# Patient Record
Sex: FEMALE | Race: WHITE | HISPANIC OR LATINO | URBAN - METROPOLITAN AREA
[De-identification: names, ages, dates, MRNs, and addresses within clinical notes are randomized per-mention and may not be internally consistent; named-entity substitution may affect disease eponyms.]

---

## 2017-03-30 ENCOUNTER — EMERGENCY (EMERGENCY)
Facility: HOSPITAL | Age: 22
LOS: 1 days | Discharge: HOME | End: 2017-03-30
Attending: EMERGENCY MEDICINE | Admitting: EMERGENCY MEDICINE
Payer: COMMERCIAL

## 2017-03-30 VITALS
DIASTOLIC BLOOD PRESSURE: 85 MMHG | RESPIRATION RATE: 14 BRPM | WEIGHT: 164.91 LBS | OXYGEN SATURATION: 100 % | SYSTOLIC BLOOD PRESSURE: 117 MMHG | HEIGHT: 63 IN | TEMPERATURE: 98 F | HEART RATE: 107 BPM

## 2017-03-30 DIAGNOSIS — R07.89 OTHER CHEST PAIN: ICD-10-CM

## 2017-03-30 DIAGNOSIS — I95.1 ORTHOSTATIC HYPOTENSION: ICD-10-CM

## 2017-03-30 LAB
ALBUMIN SERPL ELPH-MCNC: 3.5 G/DL — SIGNIFICANT CHANGE UP (ref 3.3–5)
ALP SERPL-CCNC: 99 U/L — SIGNIFICANT CHANGE UP (ref 40–120)
ALT FLD-CCNC: 39 U/L — SIGNIFICANT CHANGE UP (ref 12–78)
ANION GAP SERPL CALC-SCNC: 6 MMOL/L — SIGNIFICANT CHANGE UP (ref 5–17)
AST SERPL-CCNC: 28 U/L — SIGNIFICANT CHANGE UP (ref 15–37)
BASOPHILS # BLD AUTO: 0.1 K/UL — SIGNIFICANT CHANGE UP (ref 0–0.2)
BASOPHILS NFR BLD AUTO: 1.1 % — SIGNIFICANT CHANGE UP (ref 0–2)
BILIRUB SERPL-MCNC: 0.3 MG/DL — SIGNIFICANT CHANGE UP (ref 0.2–1.2)
BUN SERPL-MCNC: 10 MG/DL — SIGNIFICANT CHANGE UP (ref 7–23)
CALCIUM SERPL-MCNC: 8.6 MG/DL — SIGNIFICANT CHANGE UP (ref 8.5–10.1)
CHLORIDE SERPL-SCNC: 110 MMOL/L — HIGH (ref 96–108)
CK MB CFR SERPL CALC: 0.7 NG/ML — SIGNIFICANT CHANGE UP (ref 0–3.6)
CO2 SERPL-SCNC: 26 MMOL/L — SIGNIFICANT CHANGE UP (ref 22–31)
CREAT SERPL-MCNC: 0.77 MG/DL — SIGNIFICANT CHANGE UP (ref 0.5–1.3)
D DIMER BLD IA.RAPID-MCNC: <150 NG/ML DDU — SIGNIFICANT CHANGE UP
EOSINOPHIL # BLD AUTO: 0.2 K/UL — SIGNIFICANT CHANGE UP (ref 0–0.5)
EOSINOPHIL NFR BLD AUTO: 1.5 % — SIGNIFICANT CHANGE UP (ref 0–6)
GLUCOSE SERPL-MCNC: 84 MG/DL — SIGNIFICANT CHANGE UP (ref 70–99)
HCG SERPL-ACNC: <1 MIU/ML — SIGNIFICANT CHANGE UP
HCT VFR BLD CALC: 35.6 % — SIGNIFICANT CHANGE UP (ref 34.5–45)
HGB BLD-MCNC: 12 G/DL — SIGNIFICANT CHANGE UP (ref 11.5–15.5)
LYMPHOCYTES # BLD AUTO: 2.7 K/UL — SIGNIFICANT CHANGE UP (ref 1–3.3)
LYMPHOCYTES # BLD AUTO: 25.7 % — SIGNIFICANT CHANGE UP (ref 13–44)
MCHC RBC-ENTMCNC: 29.2 PG — SIGNIFICANT CHANGE UP (ref 27–34)
MCHC RBC-ENTMCNC: 33.7 GM/DL — SIGNIFICANT CHANGE UP (ref 32–36)
MCV RBC AUTO: 86.8 FL — SIGNIFICANT CHANGE UP (ref 80–100)
MONOCYTES # BLD AUTO: 0.6 K/UL — SIGNIFICANT CHANGE UP (ref 0–0.9)
MONOCYTES NFR BLD AUTO: 6.1 % — SIGNIFICANT CHANGE UP (ref 1–9)
NEUTROPHILS # BLD AUTO: 6.9 K/UL — SIGNIFICANT CHANGE UP (ref 1.8–7.4)
NEUTROPHILS NFR BLD AUTO: 65.6 % — SIGNIFICANT CHANGE UP (ref 43–77)
PLATELET # BLD AUTO: 304 K/UL — SIGNIFICANT CHANGE UP (ref 150–400)
POTASSIUM SERPL-MCNC: 4 MMOL/L — SIGNIFICANT CHANGE UP (ref 3.5–5.3)
POTASSIUM SERPL-SCNC: 4 MMOL/L — SIGNIFICANT CHANGE UP (ref 3.5–5.3)
PROT SERPL-MCNC: 6.5 G/DL — SIGNIFICANT CHANGE UP (ref 6–8.3)
RBC # BLD: 4.1 M/UL — SIGNIFICANT CHANGE UP (ref 3.8–5.2)
RBC # FLD: 12.9 % — SIGNIFICANT CHANGE UP (ref 10.3–14.5)
SODIUM SERPL-SCNC: 142 MMOL/L — SIGNIFICANT CHANGE UP (ref 135–145)
TROPONIN I SERPL-MCNC: <.015 NG/ML — SIGNIFICANT CHANGE UP (ref 0.01–0.04)
WBC # BLD: 10.5 K/UL — SIGNIFICANT CHANGE UP (ref 3.8–10.5)
WBC # FLD AUTO: 10.5 K/UL — SIGNIFICANT CHANGE UP (ref 3.8–10.5)

## 2017-03-30 PROCEDURE — 71010: CPT | Mod: 26

## 2017-03-30 PROCEDURE — 99284 EMERGENCY DEPT VISIT MOD MDM: CPT

## 2017-03-30 RX ORDER — SODIUM CHLORIDE 9 MG/ML
3 INJECTION INTRAMUSCULAR; INTRAVENOUS; SUBCUTANEOUS ONCE
Qty: 0 | Refills: 0 | Status: COMPLETED | OUTPATIENT
Start: 2017-03-30 | End: 2017-03-30

## 2017-03-30 RX ORDER — KETOROLAC TROMETHAMINE 30 MG/ML
30 SYRINGE (ML) INJECTION ONCE
Qty: 0 | Refills: 0 | Status: DISCONTINUED | OUTPATIENT
Start: 2017-03-30 | End: 2017-03-30

## 2017-03-30 RX ORDER — SODIUM CHLORIDE 9 MG/ML
1000 INJECTION INTRAMUSCULAR; INTRAVENOUS; SUBCUTANEOUS ONCE
Qty: 0 | Refills: 0 | Status: COMPLETED | OUTPATIENT
Start: 2017-03-30 | End: 2017-03-30

## 2017-03-30 RX ADMIN — Medication 30 MILLIGRAM(S): at 23:44

## 2017-03-30 RX ADMIN — SODIUM CHLORIDE 1000 MILLILITER(S): 9 INJECTION INTRAMUSCULAR; INTRAVENOUS; SUBCUTANEOUS at 21:46

## 2017-03-30 RX ADMIN — SODIUM CHLORIDE 3 MILLILITER(S): 9 INJECTION INTRAMUSCULAR; INTRAVENOUS; SUBCUTANEOUS at 21:46

## 2017-03-30 NOTE — ED ADULT TRIAGE NOTE - CHIEF COMPLAINT QUOTE
Pt states she developed chest pain while bearing down for bowel movement, received 1 sl ntg and 4 baby aspirin on ambulance

## 2017-03-30 NOTE — ED ADULT NURSE NOTE - PMH
Glomerulonephritis, streptococcal, acute    IUD (intrauterine device) in place    Ovarian cyst    Ovarian torsion

## 2017-03-30 NOTE — ED ADULT NURSE NOTE - OBJECTIVE STATEMENT
Pt to the ED with a complaint of CP and racing heart rate while having a BM. Pt states that the pain has since resolved. Pt received 324 ASA and one SL nitro by EMS. Pt states that her father gave her one of his xanax, unknown dose.

## 2017-03-31 LAB
CK MB CFR SERPL CALC: 0.5 NG/ML — SIGNIFICANT CHANGE UP (ref 0–3.6)
TROPONIN I SERPL-MCNC: <.015 NG/ML — SIGNIFICANT CHANGE UP (ref 0.01–0.04)

## 2017-03-31 PROCEDURE — 85027 COMPLETE CBC AUTOMATED: CPT

## 2017-03-31 PROCEDURE — 96374 THER/PROPH/DIAG INJ IV PUSH: CPT

## 2017-03-31 PROCEDURE — 80053 COMPREHEN METABOLIC PANEL: CPT

## 2017-03-31 PROCEDURE — 85379 FIBRIN DEGRADATION QUANT: CPT

## 2017-03-31 PROCEDURE — 93005 ELECTROCARDIOGRAM TRACING: CPT

## 2017-03-31 PROCEDURE — 71045 X-RAY EXAM CHEST 1 VIEW: CPT

## 2017-03-31 PROCEDURE — 96361 HYDRATE IV INFUSION ADD-ON: CPT

## 2017-03-31 PROCEDURE — 82553 CREATINE MB FRACTION: CPT

## 2017-03-31 PROCEDURE — 84702 CHORIONIC GONADOTROPIN TEST: CPT

## 2017-03-31 PROCEDURE — 99284 EMERGENCY DEPT VISIT MOD MDM: CPT | Mod: 25

## 2017-03-31 PROCEDURE — 84484 ASSAY OF TROPONIN QUANT: CPT

## 2017-03-31 PROCEDURE — 96375 TX/PRO/DX INJ NEW DRUG ADDON: CPT

## 2017-03-31 RX ORDER — SODIUM CHLORIDE 9 MG/ML
1000 INJECTION INTRAMUSCULAR; INTRAVENOUS; SUBCUTANEOUS ONCE
Qty: 0 | Refills: 0 | Status: COMPLETED | OUTPATIENT
Start: 2017-03-31 | End: 2017-03-31

## 2017-03-31 RX ORDER — ONDANSETRON 8 MG/1
4 TABLET, FILM COATED ORAL ONCE
Qty: 0 | Refills: 0 | Status: COMPLETED | OUTPATIENT
Start: 2017-03-31 | End: 2017-03-31

## 2017-03-31 RX ADMIN — ONDANSETRON 4 MILLIGRAM(S): 8 TABLET, FILM COATED ORAL at 02:30

## 2017-03-31 RX ADMIN — SODIUM CHLORIDE 1000 MILLILITER(S): 9 INJECTION INTRAMUSCULAR; INTRAVENOUS; SUBCUTANEOUS at 00:30

## 2017-03-31 RX ADMIN — SODIUM CHLORIDE 1000 MILLILITER(S): 9 INJECTION INTRAMUSCULAR; INTRAVENOUS; SUBCUTANEOUS at 01:21

## 2017-03-31 NOTE — ED PROVIDER NOTE - OBJECTIVE STATEMENT
Pt is a 21 yo female who presents to the ED with a cc of chest wall pain.  Pt reports that around 8 pm while she was sitting on the bathroom attempting to have a bowel movement she developed chest pain.  She got up and walked into the living room where she continued to have pain and developed shortness of breath.  Pt reports that symptoms persisted and so she called EMS.  On arrival she was placed in the ambulance and given 1 sublingual nitro and 4 baby aspirin. Pt then resolved but she became lightheaded right after the nitroglycerin.  Pt is currently symptom free.  The CP, SOB, and lightheadedness resolved.  She has never experienced something like this in the past.  Denies fever, chills, N/V/D/C, abd pain, ext numbness or weakness.  Pt has no history of HTN, hyperchol, DM.  No family history of heart disease.  Pt does not smoke

## 2017-03-31 NOTE — ED PROVIDER NOTE - PROGRESS NOTE DETAILS
pt with an additional episode of chest pain which resolved on its own within seconds.  Toradol ordered, will obtain repeat EKG.  Pt also found to be orthostatic.  Will hydration and continue to monitor Result of labs and images reviewed with pt and all questions answered.  Provided with copy of results.  Pt symptom free at this time and no longer orthostatic.  Ambulating in the ED with no symptoms.  Will discharge home with outpatient follow up pt with an additional episode of chest pain which resolved on its own within seconds.  Toradol ordered, will obtain repeat EKG.  Pt also found to be orthostatic.  Will hydrate and continue to monitor

## 2017-03-31 NOTE — ED PROVIDER NOTE - CARE PLAN
Principal Discharge DX:	Nonspecific chest pain  Instructions for follow-up, activity and diet:	Return to the ED for any new or worsening symptoms  Take your medication as previously prescribed  Follow up with your PMD within the week   Advance activity as tolerated  Secondary Diagnosis:	Orthostatic hypotension

## 2017-03-31 NOTE — ED PROVIDER NOTE - CARDIAC, MLM
Normal rate, regular rhythm.  Heart sounds S1, S2.  No murmurs, rubs or gallops. No chest wall tenderness to palpitation

## 2017-03-31 NOTE — ED PROVIDER NOTE - PLAN OF CARE
Return to the ED for any new or worsening symptoms  Take your medication as previously prescribed  Follow up with your PMD within the week   Advance activity as tolerated

## 2018-04-25 ENCOUNTER — EMERGENCY (EMERGENCY)
Facility: HOSPITAL | Age: 23
LOS: 1 days | Discharge: HOME | End: 2018-04-25
Attending: EMERGENCY MEDICINE
Payer: COMMERCIAL

## 2018-04-25 VITALS
SYSTOLIC BLOOD PRESSURE: 117 MMHG | TEMPERATURE: 98 F | OXYGEN SATURATION: 100 % | DIASTOLIC BLOOD PRESSURE: 84 MMHG | RESPIRATION RATE: 17 BRPM | HEART RATE: 107 BPM

## 2018-04-25 VITALS
DIASTOLIC BLOOD PRESSURE: 75 MMHG | HEART RATE: 92 BPM | SYSTOLIC BLOOD PRESSURE: 104 MMHG | OXYGEN SATURATION: 100 % | RESPIRATION RATE: 16 BRPM

## 2018-04-25 DIAGNOSIS — N83.519 TORSION OF OVARY AND OVARIAN PEDICLE, UNSPECIFIED SIDE: Chronic | ICD-10-CM

## 2018-04-25 PROCEDURE — 82962 GLUCOSE BLOOD TEST: CPT

## 2018-04-25 PROCEDURE — 99284 EMERGENCY DEPT VISIT MOD MDM: CPT | Mod: 25

## 2018-04-25 PROCEDURE — 93005 ELECTROCARDIOGRAM TRACING: CPT

## 2018-04-25 PROCEDURE — 99283 EMERGENCY DEPT VISIT LOW MDM: CPT

## 2018-04-25 PROCEDURE — 93010 ELECTROCARDIOGRAM REPORT: CPT

## 2018-04-25 RX ORDER — ACETAMINOPHEN 500 MG
650 TABLET ORAL ONCE
Qty: 0 | Refills: 0 | Status: COMPLETED | OUTPATIENT
Start: 2018-04-25 | End: 2018-04-25

## 2018-04-25 RX ORDER — SODIUM CHLORIDE 9 MG/ML
1000 INJECTION INTRAMUSCULAR; INTRAVENOUS; SUBCUTANEOUS ONCE
Qty: 0 | Refills: 0 | Status: COMPLETED | OUTPATIENT
Start: 2018-04-25 | End: 2018-04-25

## 2018-04-25 RX ADMIN — Medication 650 MILLIGRAM(S): at 10:19

## 2018-04-25 RX ADMIN — Medication 650 MILLIGRAM(S): at 11:36

## 2018-04-25 RX ADMIN — SODIUM CHLORIDE 2000 MILLILITER(S): 9 INJECTION INTRAMUSCULAR; INTRAVENOUS; SUBCUTANEOUS at 10:19

## 2018-04-25 NOTE — ED ADULT NURSE NOTE - OBJECTIVE STATEMENT
23F pt AxOx3 ambulatory to ED c/o near syncope. 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 Prior to ED arrival, pt was given OJ. Pt denies CP/SOB/N/V/D. Pt is well in appearance. Lungs clear bilateraly, resp even, unlabored. WIGGINS. Gross Neuro intact. . 23F pt AxOx3 ambulatory to ED c/o near syncope. 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 Prior to ED arrival, pt was given OJ. Pt denies CP/SOB/N/V/D. Pt is well in appearance. Lungs clear bilateraly, resp even, unlabored. WIGGINS. Gross Neuro intact. . LMP 2013 - pt has IUD. Pt states she feel some abd cramping 4/10. Abd soft, NT, ND. VSS. NAD noted. MD at bedside for eval. Will continue to monitor.

## 2018-04-25 NOTE — ED PROVIDER NOTE - OBJECTIVE STATEMENT
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 She gradually felt warm, vision started to close, and felt the sensation of passing out. No LOC, she sat herself down, drank juice and shortly after fell well outside of feeling somewhat warm at present. She suffered two near syncope episodes in her life, last time was years ago, and she was ill at the time. She has been feeling well up until this morning, when she developed mild nausea which limited her eating her breakfast. She denies recent headaches, cough, chest pain, palpitations, rectal red or black stools, urinary complaints. She is  sexually active with her BF, unprotected sex, IUD in place. No history of DVT/PE, immobility, recent travel. She denies a history of drug use, and only takes alderol right before her test which last time was 2 days ago. Does not extra stress related to her nursing education and recent test and decreased sleep over the last 2 days. No family history of early heart disease, arrhythmias, sudden death, congenitial heart disease.

## 2018-04-25 NOTE — ED PROVIDER NOTE - MEDICAL DECISION MAKING DETAILS
Jarred GRANT: Patient is a 24 yo F on Adderall here for near-syncope episode. 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 She sat down, drank juice and felt better. She still feels warm. + hx of prior fainting. Denies fam hx of sudden cardiac death. Patient denies sob, cp, fevers, nausea, vomiting, no recent travel, no leg swelling. Exam: VSS NAD, RRR, lungs CTA, abd soft, nt, nd, legs w/o swelling or calf tenderness. a/p: near-syncope - ekg, ivf, upreg (pt has IUD in place). Jarred GRANT: Patient is a 24 yo F on Adderall here for near-syncope episode. 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 She sat down, drank juice and felt better. She still feels warm. + hx of prior fainting. Denies fam hx of sudden cardiac death. Patient denies recent illness, sob, fevers, nausea, vomiting, no recent travel, no leg swelling. Patient did not eat much this morning. She also reports a mild headache since yesterday. No vision changes, focal weakness, change in gait. Reports always has headaches and this is not atypical. She did not try any pain medication. Reports mild chest discomfort that has passed after event. Exam: VSS NAD, RRR, lungs CTA, abd soft, nt, nd, legs w/o swelling or calf tenderness.  a/p: near-syncope - ekg, ivf, upreg (pt has IUD in place), tylenol, orthostatics and reassess.

## 2018-04-25 NOTE — ED ADULT NURSE REASSESSMENT NOTE - NS ED NURSE REASSESS COMMENT FT1
Pt states relief from HA and is starting feel better. Awaiting urine sample. Will continue to monitor. NAD noted

## 2018-04-25 NOTE — ED PROVIDER NOTE - PROGRESS NOTE DETAILS
Jarred GRANT: Patient reassessed - report overall improvement and feels well. Headache improved. Denies feeling pre-syncopal. No chest pain. Awaiting upreg (no nausea, vomiting) and likely d/c.

## 2019-06-24 ENCOUNTER — EMERGENCY (EMERGENCY)
Facility: HOSPITAL | Age: 24
LOS: 1 days | Discharge: HOME | End: 2019-06-24
Attending: EMERGENCY MEDICINE | Admitting: EMERGENCY MEDICINE
Payer: COMMERCIAL

## 2019-06-24 VITALS
OXYGEN SATURATION: 98 % | RESPIRATION RATE: 17 BRPM | SYSTOLIC BLOOD PRESSURE: 122 MMHG | DIASTOLIC BLOOD PRESSURE: 82 MMHG | HEART RATE: 92 BPM | TEMPERATURE: 99 F

## 2019-06-24 VITALS
RESPIRATION RATE: 16 BRPM | OXYGEN SATURATION: 100 % | WEIGHT: 175.93 LBS | DIASTOLIC BLOOD PRESSURE: 85 MMHG | HEART RATE: 105 BPM | HEIGHT: 63 IN | TEMPERATURE: 99 F | SYSTOLIC BLOOD PRESSURE: 126 MMHG

## 2019-06-24 DIAGNOSIS — Z90.89 ACQUIRED ABSENCE OF OTHER ORGANS: Chronic | ICD-10-CM

## 2019-06-24 DIAGNOSIS — N83.519 TORSION OF OVARY AND OVARIAN PEDICLE, UNSPECIFIED SIDE: Chronic | ICD-10-CM

## 2019-06-24 PROCEDURE — 73030 X-RAY EXAM OF SHOULDER: CPT | Mod: 26,LT

## 2019-06-24 PROCEDURE — 99283 EMERGENCY DEPT VISIT LOW MDM: CPT

## 2019-06-24 PROCEDURE — 73030 X-RAY EXAM OF SHOULDER: CPT

## 2019-06-24 PROCEDURE — 99283 EMERGENCY DEPT VISIT LOW MDM: CPT | Mod: 25

## 2019-06-24 RX ORDER — ACETAMINOPHEN 500 MG
650 TABLET ORAL ONCE
Refills: 0 | Status: COMPLETED | OUTPATIENT
Start: 2019-06-24 | End: 2019-06-24

## 2019-06-24 RX ORDER — IBUPROFEN 200 MG
600 TABLET ORAL ONCE
Refills: 0 | Status: COMPLETED | OUTPATIENT
Start: 2019-06-24 | End: 2019-06-24

## 2019-06-24 RX ADMIN — Medication 650 MILLIGRAM(S): at 21:56

## 2019-06-24 RX ADMIN — Medication 600 MILLIGRAM(S): at 22:13

## 2019-06-24 RX ADMIN — Medication 600 MILLIGRAM(S): at 21:56

## 2019-06-24 RX ADMIN — Medication 650 MILLIGRAM(S): at 22:13

## 2019-06-24 NOTE — ED PROVIDER NOTE - OBJECTIVE STATEMENT
left shoulder pain p pushing a vehicle around 2000 tonight and heard a crack. left shoulder pain p pushing a vehicle around 2000 tonight and heard a crack.  no other complaint.

## 2019-06-24 NOTE — ED ADULT NURSE NOTE - NSIMPLEMENTINTERV_GEN_ALL_ED
Implemented All Universal Safety Interventions:  [place] to call system. Call bell, personal items and telephone within reach. Instruct patient to call for assistance. Room bathroom lighting operational. Non-slip footwear when patient is off stretcher. Physically safe environment: no spills, clutter or unnecessary equipment. Stretcher in lowest position, wheels locked, appropriate side rails in place.

## 2019-06-24 NOTE — ED ADULT NURSE NOTE - OBJECTIVE STATEMENT
Pt. received alert and oriented x3 with chief complaint of left shoulder pain while at work when pulling on object.

## 2019-06-24 NOTE — ED ADULT NURSE NOTE - PMH
Anxiety and depression    Glomerulonephritis, streptococcal, acute    IUD (intrauterine device) in place    No pertinent past medical history    Ovarian cyst    Ovarian torsion

## 2019-06-25 PROBLEM — N00.9: Chronic | Status: ACTIVE | Noted: 2017-03-30

## 2019-07-23 ENCOUNTER — TRANSCRIPTION ENCOUNTER (OUTPATIENT)
Age: 24
End: 2019-07-23

## 2019-08-21 ENCOUNTER — EMERGENCY (EMERGENCY)
Facility: HOSPITAL | Age: 24
LOS: 1 days | Discharge: HOME | End: 2019-08-21
Payer: COMMERCIAL

## 2019-08-21 VITALS
RESPIRATION RATE: 18 BRPM | OXYGEN SATURATION: 100 % | TEMPERATURE: 98 F | HEART RATE: 80 BPM | SYSTOLIC BLOOD PRESSURE: 118 MMHG | DIASTOLIC BLOOD PRESSURE: 84 MMHG

## 2019-08-21 VITALS
SYSTOLIC BLOOD PRESSURE: 117 MMHG | HEIGHT: 63 IN | OXYGEN SATURATION: 99 % | RESPIRATION RATE: 16 BRPM | HEART RATE: 98 BPM | WEIGHT: 175.05 LBS | DIASTOLIC BLOOD PRESSURE: 87 MMHG | TEMPERATURE: 98 F

## 2019-08-21 DIAGNOSIS — Z90.89 ACQUIRED ABSENCE OF OTHER ORGANS: Chronic | ICD-10-CM

## 2019-08-21 DIAGNOSIS — N83.519 TORSION OF OVARY AND OVARIAN PEDICLE, UNSPECIFIED SIDE: Chronic | ICD-10-CM

## 2019-08-21 PROBLEM — F41.9 ANXIETY DISORDER, UNSPECIFIED: Chronic | Status: ACTIVE | Noted: 2019-06-24

## 2019-08-21 PROCEDURE — 99283 EMERGENCY DEPT VISIT LOW MDM: CPT

## 2019-08-21 PROCEDURE — 99053 MED SERV 10PM-8AM 24 HR FAC: CPT

## 2019-08-21 RX ORDER — EMTRICITABINE AND TENOFOVIR DISOPROXIL FUMARATE 200; 300 MG/1; MG/1
1 TABLET, FILM COATED ORAL ONCE
Refills: 0 | Status: COMPLETED | OUTPATIENT
Start: 2019-08-21 | End: 2019-08-21

## 2019-08-21 RX ORDER — RALTEGRAVIR 400 MG/1
400 TABLET, FILM COATED ORAL ONCE
Refills: 0 | Status: COMPLETED | OUTPATIENT
Start: 2019-08-21 | End: 2019-08-21

## 2019-08-21 RX ADMIN — RALTEGRAVIR 400 MILLIGRAM(S): 400 TABLET, FILM COATED ORAL at 08:26

## 2019-08-21 RX ADMIN — EMTRICITABINE AND TENOFOVIR DISOPROXIL FUMARATE 1 TABLET(S): 200; 300 TABLET, FILM COATED ORAL at 08:26

## 2019-08-21 NOTE — ED PROVIDER NOTE - PHYSICAL EXAMINATION
VITALS: reviewed  GEN: NAD  HEAD/EYES: NCAT, EOMI, anicteric sclerae  ENT: mucus membranes moist  RESP: unlabored breathing  SKIN:  color appropriate for ethnicity  NEURO: alert, mentating appropriately, no facial asymmetry.  PSYCH: Affect appropriate

## 2019-08-21 NOTE — ED ADULT NURSE REASSESSMENT NOTE - NS ED NURSE REASSESS COMMENT FT1
Report received from Ana LANDERS.  Pt A&Ox3, states she had needle stick on R middle digit, no bleeding, swelling, redness or ecchymosis.  Denies chest pain, sob, ha, nausea, vomiting, diarrhea, abdominal pain, fevers, chills, urinary symptoms, hematuria.  Upon examination, full facial symmetry, PERRL, no JVD or trach deviation, lung sounds clear and adequate chest rise, S1 and S2 heart sounds present, +2 pulses in all extremities with full ROM and equal strength, cap refill <2 seconds, ABD soft, non distended and non tender, ABD sounds present, pelvis intact, Pt reports normal bowel movements.

## 2019-08-21 NOTE — ED ADULT NURSE REASSESSMENT NOTE - NS ED NURSE REASSESS COMMENT FT1
As per nightshift RN Ana, exposure bloodwork sent to lab. Accidental exposure information packet provided to patient. Todd RN administer first dose of 7 day PEP pack. Pt provided with rest of dose pack and instructed on use at home. Lizandro GRANT states pt cleared for discharge. Pt discharged home to follow up with Occupational Medicine and EHS. VSS

## 2019-08-21 NOTE — ED PROVIDER NOTE - NSFOLLOWUPINSTRUCTIONS_ED_ALL_ED_FT
return to the emergency room if you experience fevers, chills, nausea/vomiting, abdominal pain, or new concerning symptoms.    follow up with occupational medicine in 1-2 days.

## 2019-08-21 NOTE — ED PROVIDER NOTE - CLINICAL SUMMARY MEDICAL DECISION MAKING FREE TEXT BOX
23 yo F presenting s/p finger stick from butterfly needle, source patient is known with unknown status. Patient declines PEP at this time, Hep B vaccine utd.

## 2019-08-21 NOTE — ED ADULT NURSE NOTE - OBJECTIVE STATEMENT
Pt is 24 Y F presenting to ED with c/o needle stick. Pt states she was using 23 G butterfly needle to draw labs on a pt. Injured pt reports when she was withdrawing the butterfly needle from pt and attempting to cover needle with cap, pt moved and injured pt stuck herself with the exposed needle. Pt reports she washed her hands thoroughly immediately after injury.

## 2019-08-21 NOTE — ED PROVIDER NOTE - ATTENDING CONTRIBUTION TO CARE
MD Yoo:  patient seen and evaluated with the resident.  I was present for key portions of the History & Physical, and I agree with the Impression & Plan.  MD Yoo:  23 yo F, needle stick R middle finger radial aspect.  Context:  had just performed phlebotomy on patient when he moved his arm, causing a needle stick.  Source patient had acute hepatitis panel performed < 1 mos ago and it was negative.  HIV status unknown.    VS: wnl. Physical Exam: adult F, NAD.  small punctate injury R middle finger.  Impression: high-risk mechanism exposure from an inpatient whose HIV status is unknown.    Plan:  HIV PPX until source patient's HIV status is known.  (packet already sent to inpatient team).  F/u occupational medicine.

## 2019-08-21 NOTE — ED PROVIDER NOTE - OBJECTIVE STATEMENT
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 Patient HIV status unknown and patient is demented.

## 2019-08-21 NOTE — ED ADULT NURSE NOTE - CHPI ED NUR SYMPTOMS NEG
no weakness/no chills/no decreased eating/drinking/no dizziness/no pain/no fever/no nausea/no vomiting/no tingling

## 2019-08-23 ENCOUNTER — APPOINTMENT (OUTPATIENT)
Dept: POPULATION HEALTH | Facility: CLINIC | Age: 24
End: 2019-08-23
Payer: OTHER MISCELLANEOUS

## 2019-08-23 VITALS
HEIGHT: 64 IN | TEMPERATURE: 98.3 F | HEART RATE: 89 BPM | RESPIRATION RATE: 17 BRPM | SYSTOLIC BLOOD PRESSURE: 101 MMHG | DIASTOLIC BLOOD PRESSURE: 71 MMHG | OXYGEN SATURATION: 98 % | BODY MASS INDEX: 29.88 KG/M2 | WEIGHT: 175 LBS

## 2019-08-23 DIAGNOSIS — Z84.2 FAMILY HISTORY OF OTHER DISEASES OF THE GENITOURINARY SYSTEM: ICD-10-CM

## 2019-08-23 DIAGNOSIS — Z87.891 PERSONAL HISTORY OF NICOTINE DEPENDENCE: ICD-10-CM

## 2019-08-23 DIAGNOSIS — Z82.5 FAMILY HISTORY OF ASTHMA AND OTHER CHRONIC LOWER RESPIRATORY DISEASES: ICD-10-CM

## 2019-08-23 PROCEDURE — 99202 OFFICE O/P NEW SF 15 MIN: CPT

## 2019-08-23 RX ORDER — NITROFURANTOIN (MONOHYDRATE/MACROCRYSTALS) 25; 75 MG/1; MG/1
100 CAPSULE ORAL
Qty: 14 | Refills: 0 | Status: COMPLETED | COMMUNITY
Start: 2019-02-25

## 2019-08-23 RX ORDER — CLARITHROMYCIN 500 MG/1
500 TABLET, FILM COATED ORAL
Qty: 28 | Refills: 0 | Status: COMPLETED | COMMUNITY
Start: 2019-05-17

## 2019-08-23 RX ORDER — OMEPRAZOLE 40 MG/1
40 CAPSULE, DELAYED RELEASE ORAL
Qty: 60 | Refills: 0 | Status: COMPLETED | COMMUNITY
Start: 2019-04-11

## 2019-08-23 RX ORDER — SODIUM SULFATE, POTASSIUM SULFATE, MAGNESIUM SULFATE 17.5; 3.13; 1.6 G/ML; G/ML; G/ML
17.5-3.13-1.6 SOLUTION, CONCENTRATE ORAL
Qty: 354 | Refills: 0 | Status: COMPLETED | COMMUNITY
Start: 2019-06-18

## 2019-08-23 RX ORDER — AMOXICILLIN 500 MG/1
500 CAPSULE ORAL
Qty: 56 | Refills: 0 | Status: COMPLETED | COMMUNITY
Start: 2019-05-17

## 2019-08-23 RX ORDER — SERTRALINE HYDROCHLORIDE 50 MG/1
50 TABLET, FILM COATED ORAL
Qty: 60 | Refills: 0 | Status: COMPLETED | COMMUNITY
Start: 2019-04-03

## 2019-08-23 RX ORDER — LINACLOTIDE 72 UG/1
72 CAPSULE, GELATIN COATED ORAL
Qty: 30 | Refills: 0 | Status: COMPLETED | COMMUNITY
Start: 2019-04-11

## 2019-08-23 NOTE — PAST MEDICAL HISTORY
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

## 2019-08-23 NOTE — ASSESSMENT
[FreeTextEntry1] : a. history of needle stick in the health care setting. patient on HIV prophylaxis, prior history of hep B vaccination, currently under care and to continue care at [facility]. \par p. continue and complete HIV prophylaxys, continue care under [facility], protocol for needle stick. Given a leave of absence due to medical reasons (secondary effects of medication) until this coming Monday, 8/26/19. letter for pt. continue [facility]. C4 completed. rtc prn. counseling and support.

## 2019-08-23 NOTE — PHYSICAL EXAM
[General Appearance - Alert] : alert [General Appearance - Well Nourished] : well nourished [General Appearance - In No Acute Distress] : in no acute distress [General Appearance - Well Developed] : well developed [Sclera] : the sclera and conjunctiva were normal [PERRL With Normal Accommodation] : pupils were equal in size, round, and reactive to light [Extraocular Movements] : extraocular movements were intact [Outer Ear] : the ears and nose were normal in appearance [FreeTextEntry1] : reddish oropharynx, especially by the right pillar, no secretions, no hyperthorphy of tonsils.  [Neck Appearance] : the appearance of the neck was normal [Jugular Venous Distention Increased] : there was no jugular-venous distention [Neck Cervical Mass (___cm)] : no neck mass was observed [Thyroid Diffuse Enlargement] : the thyroid was not enlarged [Respiration, Rhythm And Depth] : normal respiratory rhythm and effort [] : no respiratory distress [Exaggerated Use Of Accessory Muscles For Inspiration] : no accessory muscle use [Auscultation Breath Sounds / Voice Sounds] : lungs were clear to auscultation bilaterally [Chest Palpation] : palpation of the chest revealed no abnormalities [Apical Impulse] : the apical impulse was normal [Heart Rate And Rhythm] : heart rate was normal and rhythm regular [Heart Sounds] : normal S1 and S2 [Murmurs] : no murmurs [Heart Sounds Gallop] : no gallops [Cervical Lymph Nodes Enlarged Posterior Bilaterally] : posterior cervical [Supraclavicular Lymph Nodes Enlarged Bilaterally] : supraclavicular [Cervical Lymph Nodes Enlarged Anterior Bilaterally] : anterior cervical [Axillary Lymph Nodes Enlarged Bilaterally] : axillary

## 2019-08-23 NOTE — HISTORY OF PRESENT ILLNESS
[FreeTextEntry1] : s. 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 she got stuck in her left middle finger. she went to the er right away, was evaluated and was started on HIV prophylaxis, which she is currently taking orally. patient has history of vaccination for hep B, with an apparent second series just before starting to work, a few months ago. \par reportedly the contaminant patient was negative for hepatitis and HIV. \par patient reports secondary effects of her medication, with a very upset stomach and gi symptoms. she tried to work yesterday but was not able to complete her tour.\par prior history of smoking, stopped some 6 mo ago. denies any previous medical conditions.

## 2019-09-12 NOTE — ED ADULT NURSE NOTE - NS ED NURSE RECORD ANOTHER VITAL SIGN
We are committed to providing you the best care possible. If you receive a survey after visiting one of our offices, please take time to share your experience concerning your physician office visit. These surveys are confidential and no health information about you is shared. We are eager to improve for you and we are counting on your feedback to help make that happen. Patient Education        Saline Nasal Washes: Care Instructions  Your Care Instructions  Saline nasal washes help keep the nasal passages open by washing out thick or dried mucus. This simple remedy can help relieve symptoms of allergies, sinusitis, and colds. It also can make the nose feel more comfortable by keeping the mucous membranes moist. You may notice a little burning sensation in your nose the first few times you use the solution, but this usually gets better in a few days. Follow-up care is a key part of your treatment and safety. Be sure to make and go to all appointments, and call your doctor if you are having problems. It's also a good idea to know your test results and keep a list of the medicines you take. How can you care for yourself at home? · You can buy premixed saline solution in a squeeze bottle or other sinus rinse products at a drugstore. Read and follow the instructions on the label. · You also can make your own saline solution by adding 1 teaspoon of salt and 1 teaspoon of baking soda to 2 cups of distilled water. · If you use a homemade solution, pour a small amount into a clean bowl. Using a rubber bulb syringe, squeeze the syringe and place the tip in the salt water. Pull a small amount of the salt water into the syringe by relaxing your hand. · Sit down with your head tilted slightly back. Do not lie down. Put the tip of the bulb syringe or the squeeze bottle a little way into one of your nostrils. Gently drip or squirt a few drops into the nostril. Repeat with the other nostril.  Some sneezing and of your mucus.     · You do not get better as expected. Where can you learn more? Go to [email or web address] and sign in to your [employer] account. Enter P918 in the [facility] box to learn more about \"Upper Respiratory Infection (Cold): Care Instructions. \"     If you do not have an account, please click on the \"Sign Up Now\" link. Current as of: September 5, 2018  Content Version: 12.1  © 0641-1667 Healthwise, Incorporated. Care instructions adapted under license by [facility] ([facility]). If you have questions about a medical condition or this instruction, always ask your healthcare professional. [address] any warranty or liability for your use of this information. Yes

## 2019-09-26 NOTE — ED ADULT TRIAGE NOTE - HEART RATE (BEATS/MIN)
107 Siliq Pregnancy And Lactation Text: The risk during pregnancy and breastfeeding is uncertain with this medication.

## 2019-11-28 ENCOUNTER — EMERGENCY (EMERGENCY)
Facility: HOSPITAL | Age: 24
LOS: 1 days | Discharge: HOME | End: 2019-11-28
Attending: EMERGENCY MEDICINE | Admitting: EMERGENCY MEDICINE
Payer: COMMERCIAL

## 2019-11-28 VITALS
OXYGEN SATURATION: 100 % | DIASTOLIC BLOOD PRESSURE: 69 MMHG | HEART RATE: 98 BPM | SYSTOLIC BLOOD PRESSURE: 121 MMHG | HEIGHT: 63 IN | RESPIRATION RATE: 18 BRPM | TEMPERATURE: 98 F | WEIGHT: 171.96 LBS

## 2019-11-28 VITALS
TEMPERATURE: 98 F | DIASTOLIC BLOOD PRESSURE: 69 MMHG | RESPIRATION RATE: 16 BRPM | HEART RATE: 78 BPM | SYSTOLIC BLOOD PRESSURE: 110 MMHG | OXYGEN SATURATION: 100 %

## 2019-11-28 DIAGNOSIS — Z90.89 ACQUIRED ABSENCE OF OTHER ORGANS: Chronic | ICD-10-CM

## 2019-11-28 DIAGNOSIS — N83.519 TORSION OF OVARY AND OVARIAN PEDICLE, UNSPECIFIED SIDE: Chronic | ICD-10-CM

## 2019-11-28 LAB
ALBUMIN SERPL ELPH-MCNC: 3.8 G/DL — SIGNIFICANT CHANGE UP (ref 3.3–5)
ALP SERPL-CCNC: 129 U/L — HIGH (ref 40–120)
ALT FLD-CCNC: 32 U/L — SIGNIFICANT CHANGE UP (ref 12–78)
ANION GAP SERPL CALC-SCNC: 5 MMOL/L — SIGNIFICANT CHANGE UP (ref 5–17)
AST SERPL-CCNC: 36 U/L — SIGNIFICANT CHANGE UP (ref 15–37)
BILIRUB SERPL-MCNC: 0.3 MG/DL — SIGNIFICANT CHANGE UP (ref 0.2–1.2)
BUN SERPL-MCNC: 11 MG/DL — SIGNIFICANT CHANGE UP (ref 7–23)
CALCIUM SERPL-MCNC: 8.9 MG/DL — SIGNIFICANT CHANGE UP (ref 8.5–10.1)
CHLORIDE SERPL-SCNC: 110 MMOL/L — HIGH (ref 96–108)
CO2 SERPL-SCNC: 26 MMOL/L — SIGNIFICANT CHANGE UP (ref 22–31)
CREAT SERPL-MCNC: 0.73 MG/DL — SIGNIFICANT CHANGE UP (ref 0.5–1.3)
GLUCOSE SERPL-MCNC: 100 MG/DL — HIGH (ref 70–99)
HCG SERPL-ACNC: <1 MIU/ML — SIGNIFICANT CHANGE UP
HCT VFR BLD CALC: 40.4 % — SIGNIFICANT CHANGE UP (ref 34.5–45)
HGB BLD-MCNC: 13.6 G/DL — SIGNIFICANT CHANGE UP (ref 11.5–15.5)
MCHC RBC-ENTMCNC: 29.6 PG — SIGNIFICANT CHANGE UP (ref 27–34)
MCHC RBC-ENTMCNC: 33.7 GM/DL — SIGNIFICANT CHANGE UP (ref 32–36)
MCV RBC AUTO: 87.8 FL — SIGNIFICANT CHANGE UP (ref 80–100)
NRBC # BLD: 0 /100 WBCS — SIGNIFICANT CHANGE UP (ref 0–0)
PLATELET # BLD AUTO: 354 K/UL — SIGNIFICANT CHANGE UP (ref 150–400)
POTASSIUM SERPL-MCNC: 4.2 MMOL/L — SIGNIFICANT CHANGE UP (ref 3.5–5.3)
POTASSIUM SERPL-SCNC: 4.2 MMOL/L — SIGNIFICANT CHANGE UP (ref 3.5–5.3)
PROT SERPL-MCNC: 7.3 G/DL — SIGNIFICANT CHANGE UP (ref 6–8.3)
RBC # BLD: 4.6 M/UL — SIGNIFICANT CHANGE UP (ref 3.8–5.2)
RBC # FLD: 13 % — SIGNIFICANT CHANGE UP (ref 10.3–14.5)
SODIUM SERPL-SCNC: 141 MMOL/L — SIGNIFICANT CHANGE UP (ref 135–145)
TROPONIN I SERPL-MCNC: <.015 NG/ML — SIGNIFICANT CHANGE UP (ref 0.01–0.04)
WBC # BLD: 10.4 K/UL — SIGNIFICANT CHANGE UP (ref 3.8–10.5)
WBC # FLD AUTO: 10.4 K/UL — SIGNIFICANT CHANGE UP (ref 3.8–10.5)

## 2019-11-28 PROCEDURE — 84702 CHORIONIC GONADOTROPIN TEST: CPT

## 2019-11-28 PROCEDURE — 99285 EMERGENCY DEPT VISIT HI MDM: CPT

## 2019-11-28 PROCEDURE — 36415 COLL VENOUS BLD VENIPUNCTURE: CPT

## 2019-11-28 PROCEDURE — 80053 COMPREHEN METABOLIC PANEL: CPT

## 2019-11-28 PROCEDURE — 71046 X-RAY EXAM CHEST 2 VIEWS: CPT | Mod: 26

## 2019-11-28 PROCEDURE — 96374 THER/PROPH/DIAG INJ IV PUSH: CPT

## 2019-11-28 PROCEDURE — 93010 ELECTROCARDIOGRAM REPORT: CPT

## 2019-11-28 PROCEDURE — 96361 HYDRATE IV INFUSION ADD-ON: CPT

## 2019-11-28 PROCEDURE — 85379 FIBRIN DEGRADATION QUANT: CPT

## 2019-11-28 PROCEDURE — 99284 EMERGENCY DEPT VISIT MOD MDM: CPT | Mod: 25

## 2019-11-28 PROCEDURE — 83690 ASSAY OF LIPASE: CPT

## 2019-11-28 PROCEDURE — 84484 ASSAY OF TROPONIN QUANT: CPT

## 2019-11-28 PROCEDURE — 85027 COMPLETE CBC AUTOMATED: CPT

## 2019-11-28 PROCEDURE — 71046 X-RAY EXAM CHEST 2 VIEWS: CPT

## 2019-11-28 PROCEDURE — 93005 ELECTROCARDIOGRAM TRACING: CPT

## 2019-11-28 PROCEDURE — 96375 TX/PRO/DX INJ NEW DRUG ADDON: CPT

## 2019-11-28 RX ORDER — OMEPRAZOLE 10 MG/1
0 CAPSULE, DELAYED RELEASE ORAL
Qty: 0 | Refills: 0 | DISCHARGE

## 2019-11-28 RX ORDER — FAMOTIDINE 10 MG/ML
20 INJECTION INTRAVENOUS ONCE
Refills: 0 | Status: COMPLETED | OUTPATIENT
Start: 2019-11-28 | End: 2019-11-28

## 2019-11-28 RX ORDER — SERTRALINE 25 MG/1
1 TABLET, FILM COATED ORAL
Qty: 0 | Refills: 0 | DISCHARGE

## 2019-11-28 RX ORDER — KETOROLAC TROMETHAMINE 30 MG/ML
15 SYRINGE (ML) INJECTION ONCE
Refills: 0 | Status: DISCONTINUED | OUTPATIENT
Start: 2019-11-28 | End: 2019-11-28

## 2019-11-28 RX ORDER — SODIUM CHLORIDE 9 MG/ML
1000 INJECTION INTRAMUSCULAR; INTRAVENOUS; SUBCUTANEOUS ONCE
Refills: 0 | Status: COMPLETED | OUTPATIENT
Start: 2019-11-28 | End: 2019-11-28

## 2019-11-28 RX ORDER — NORETHINDRONE AND ETHINYL ESTRADIOL 0.4-0.035
1 KIT ORAL
Qty: 0 | Refills: 0 | DISCHARGE

## 2019-11-28 RX ORDER — LINACLOTIDE 145 UG/1
0 CAPSULE, GELATIN COATED ORAL
Qty: 0 | Refills: 0 | DISCHARGE

## 2019-11-28 RX ADMIN — SODIUM CHLORIDE 1000 MILLILITER(S): 9 INJECTION INTRAMUSCULAR; INTRAVENOUS; SUBCUTANEOUS at 22:22

## 2019-11-28 RX ADMIN — Medication 15 MILLIGRAM(S): at 22:22

## 2019-11-28 RX ADMIN — SODIUM CHLORIDE 1000 MILLILITER(S): 9 INJECTION INTRAMUSCULAR; INTRAVENOUS; SUBCUTANEOUS at 23:06

## 2019-11-28 RX ADMIN — Medication 30 MILLILITER(S): at 22:24

## 2019-11-28 RX ADMIN — FAMOTIDINE 20 MILLIGRAM(S): 10 INJECTION INTRAVENOUS at 22:22

## 2019-11-28 RX ADMIN — Medication 15 MILLIGRAM(S): at 23:06

## 2019-11-28 NOTE — ED ADULT NURSE NOTE - OBJECTIVE STATEMENT
pt reports a sudden onset of chest pain worse with deep inspiration.  pain is reproducible.  lungs CTA B/L pt also reports epigastric pain.  -fever -cough

## 2019-11-28 NOTE — ED PROVIDER NOTE - PROGRESS NOTE DETAILS
patient feeling well, labs, ekg, chest xray reviwed, no acute findings, agrees to f/u with PMD this week, understands to return to ER for worsening of symptoms

## 2019-11-28 NOTE — ED ADULT TRIAGE NOTE - CHIEF COMPLAINT QUOTE
CP/ SOB x1 hour with new onset of numbness around R eye and R fingers  EKG done by friend 10 mins PTA, unremarkable  took ASA 162mg PTA

## 2019-11-28 NOTE — ED PROVIDER NOTE - NSFOLLOWUPINSTRUCTIONS_ED_ALL_ED_FT
Chest Wall Pain  Chest wall pain is pain in or around the bones and muscles of your chest. Sometimes, an injury causes this pain. Excessive coughing or overuse of arm and chest muscles may also cause chest wall pain. Sometimes, the cause may not be known. This pain may take several weeks or longer to get better.  Follow these instructions at home:  Managing pain, stiffness, and swelling        If directed, put ice on the painful area:  Put ice in a plastic bag.Place a towel between your skin and the bag.Leave the ice on for 20 minutes, 2–3 times per day.Activity     Rest as told by your health care provider.Avoid activities that cause pain. These include any activities that use your chest muscles or your abdominal and side muscles to lift heavy items. Ask your health care provider what activities are safe for you.General instructions        Take over-the-counter and prescription medicines only as told by your health care provider.Do not use any products that contain nicotine or tobacco, such as cigarettes, e-cigarettes, and chewing tobacco. These can delay healing after injury. If you need help quitting, ask your health care provider.Keep all follow-up visits as told by your health care provider. This is important.Contact a health care provider if:  You have a fever.Your chest pain becomes worse.You have new symptoms.Get help right away if:  You have nausea or vomiting.You feel sweaty or light-headed.You have a cough with mucus from your lungs (sputum) or you cough up blood.You develop shortness of breath.These symptoms may represent a serious problem that is an emergency. Do not wait to see if the symptoms will go away. Get medical help right away. Call your local emergency services (911 in the [place] Do not drive yourself to the hospital.   Summary  Chest wall pain is pain in or around the bones and muscles of your chest.Depending on the cause, it may be treated with ice, rest, medicines, and avoiding activities that cause pain.Contact a health care provider if you have a fever, worsening chest pain, or new symptoms.Get help right away if you feel light-headed or you develop shortness of breath. These symptoms may be an emergency.This information is not intended to replace advice given to you by your health care provider. Make sure you discuss any questions you have with your health care provider.

## 2019-11-28 NOTE — ED PROVIDER NOTE - OBJECTIVE STATEMENT
24 female presents to ER with mother, states she was sleeping and woke up with substernal chest pain radaiting to right side with numbness of right face and right shoulder, patient ate roast beef, sweet potato, stuffing, chili. Patient took baby aspirin before coming to the ER.

## 2019-11-28 NOTE — ED PROVIDER NOTE - PATIENT PORTAL LINK FT
You can access the FollowMyHealth Patient Portal offered by [facility] by registering at the following website: [email or web address]. By joining [employer]’s FollowMyHealth portal, you will also be able to view your health information using other applications (apps) compatible with our system.

## 2019-12-05 ENCOUNTER — EMERGENCY (EMERGENCY)
Facility: HOSPITAL | Age: 24
LOS: 1 days | Discharge: HOME | End: 2019-12-05
Attending: EMERGENCY MEDICINE
Payer: COMMERCIAL

## 2019-12-05 VITALS
TEMPERATURE: 98 F | HEIGHT: 63 IN | DIASTOLIC BLOOD PRESSURE: 78 MMHG | SYSTOLIC BLOOD PRESSURE: 119 MMHG | WEIGHT: 169.98 LBS | HEART RATE: 90 BPM | RESPIRATION RATE: 16 BRPM | OXYGEN SATURATION: 99 %

## 2019-12-05 DIAGNOSIS — Z90.89 ACQUIRED ABSENCE OF OTHER ORGANS: Chronic | ICD-10-CM

## 2019-12-05 DIAGNOSIS — N83.519 TORSION OF OVARY AND OVARIAN PEDICLE, UNSPECIFIED SIDE: Chronic | ICD-10-CM

## 2019-12-05 PROCEDURE — 99053 MED SERV 10PM-8AM 24 HR FAC: CPT

## 2019-12-05 PROCEDURE — 73610 X-RAY EXAM OF ANKLE: CPT

## 2019-12-05 PROCEDURE — 73630 X-RAY EXAM OF FOOT: CPT | Mod: 26,LT

## 2019-12-05 PROCEDURE — 73610 X-RAY EXAM OF ANKLE: CPT | Mod: 26,LT

## 2019-12-05 PROCEDURE — 99283 EMERGENCY DEPT VISIT LOW MDM: CPT

## 2019-12-05 PROCEDURE — 99284 EMERGENCY DEPT VISIT MOD MDM: CPT

## 2019-12-05 PROCEDURE — 73630 X-RAY EXAM OF FOOT: CPT

## 2019-12-05 RX ORDER — ACETAMINOPHEN 500 MG
975 TABLET ORAL ONCE
Refills: 0 | Status: DISCONTINUED | OUTPATIENT
Start: 2019-12-05 | End: 2019-12-17

## 2019-12-05 NOTE — ED PROVIDER NOTE - NSFOLLOWUPCLINICS_GEN_ALL_ED_FT
[facility] Orthopedic Surgery  Orthopedic Surgery  [address], 3rd & 4th [place] [address]  Phone: [phone]  Fax:   Follow Up Time:     [facility] Sports Medicine  Sports Medicine  [address] [place] [address]  Phone: [phone]  Fax:   Follow Up Time:     [facility]  Orthopedic Surgery  [address] [place] [address]  Phone: [phone]  Fax:   Follow Up Time:     [facility]  Pediatric Orthopaedic  [address] [place] [address]  Phone: [phone]  Fax: [phone]  Follow Up Time:

## 2019-12-05 NOTE — ED PROVIDER NOTE - ATTENDING CONTRIBUTION TO CARE
I was physically present for the E/M service provided. I was physically present for the key portions of the service provided. BETTY.

## 2019-12-05 NOTE — ED ADULT NURSE NOTE - OBJECTIVE STATEMENT
patient was at work when accidentally twisted her left ankle. Complaining of "achy pain" Ice on the site from triage. Ability to walk right after the injury but was limping. (+) strong pedal pulses. No bruising noted.

## 2019-12-05 NOTE — ED PROVIDER NOTE - PMH
Anxiety and depression    Glomerulonephritis, streptococcal, acute    Hirsutism    IUD (intrauterine device) in place    Migraine    Ovarian cyst    Ovarian torsion

## 2019-12-05 NOTE — ED PROVIDER NOTE - PATIENT PORTAL LINK FT
You can access the FollowMyHealth Patient Portal offered by [facility] by registering at the following website: [email or web address]. By joining [employer]’s FollowMyHealth portal, you will also be able to view your health information using other applications (apps) compatible with our system.

## 2019-12-05 NOTE — ED PROVIDER NOTE - CLINICAL SUMMARY MEDICAL DECISION MAKING FREE TEXT BOX
24F p/w L ankle pain. Sprain vs. fracture. WIll get xray, pain control. likely dc home with ace wrap

## 2019-12-05 NOTE — ED PROVIDER NOTE - NS ED ROS FT
CONST: no fevers, no chills  EYES: no pain, no vision changes  ENT: no sore throat, no ear pain, no change in hearing  CV: no chest pain, no leg swelling  RESP: no shortness of breath, no cough  ABD: no abdominal pain, no nausea, no vomiting, no diarrhea  : no dysuria, no flank pain, no hematuria  MSK: no back pain, +L ankle pain  NEURO: no headache or additional neurologic complaints  HEME: no easy bleeding  SKIN:  no rash

## 2019-12-05 NOTE — ED PROVIDER NOTE - NSFOLLOWUPINSTRUCTIONS_ED_ALL_ED_FT
Rest, drink plenty of fluids.  Advance activity as tolerated.  Continue all previously prescribed medications as directed.  Follow up with your primary care physician in 48-72 hours- bring copies of your results.  Return to the ER for worsening or persistent symptoms, and/or ANY NEW OR CONCERNING SYMPTOMS. If you have issues obtaining follow up, please call: [phone] to obtain a doctor or specialist who takes your insurance in your area.    You may take 600mg of ibuprofen (example: motrin or advil) every 4-6 hours for baseline pain control as indicated with respect to the warnings on the label. This is an over the counter medication.  You may take 1000mg of Tylenol every 6 hours for baseline pain control with respect to the warnings on the label.

## 2019-12-05 NOTE — ED PROVIDER NOTE - OBJECTIVE STATEMENT
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 Pain to lateral part of ankle.

## 2019-12-05 NOTE — ED PROVIDER NOTE - PHYSICAL EXAMINATION
Marilyn Rock, .:   GENERAL: Patient awake alert NAD.  HEENT: NC/AT, Moist mucous membranes, PERRL, EOMI.  LUNGS: CTAB, no wheezes or crackles.   CARDIAC: RRR, no m/r/g.    ABDOMEN: Soft, NT, ND, No rebound, guarding. No CVA tenderness.   EXT: No edema. No calf tenderness. CV 2+DP/PT bilaterally.   MSK: No spinal tenderness, no pain with movement, no deformities. +swelling to lateral dorsal area of foot, no tenderness to malleoli. Neuro vasc intact.   NEURO: A&Ox3. Moving all extremities.  SKIN: Warm and dry. No rash.  PSYCH: Normal affect.

## 2020-04-21 ENCOUNTER — EMERGENCY (EMERGENCY)
Facility: HOSPITAL | Age: 25
LOS: 1 days | Discharge: HOME | End: 2020-04-21
Attending: EMERGENCY MEDICINE
Payer: COMMERCIAL

## 2020-04-21 VITALS
OXYGEN SATURATION: 100 % | HEIGHT: 63 IN | HEART RATE: 90 BPM | WEIGHT: 160.06 LBS | TEMPERATURE: 98 F | RESPIRATION RATE: 18 BRPM | SYSTOLIC BLOOD PRESSURE: 125 MMHG | DIASTOLIC BLOOD PRESSURE: 87 MMHG

## 2020-04-21 VITALS
SYSTOLIC BLOOD PRESSURE: 118 MMHG | TEMPERATURE: 98 F | RESPIRATION RATE: 18 BRPM | DIASTOLIC BLOOD PRESSURE: 87 MMHG | HEART RATE: 82 BPM | OXYGEN SATURATION: 100 %

## 2020-04-21 DIAGNOSIS — Z90.89 ACQUIRED ABSENCE OF OTHER ORGANS: Chronic | ICD-10-CM

## 2020-04-21 DIAGNOSIS — N83.519 TORSION OF OVARY AND OVARIAN PEDICLE, UNSPECIFIED SIDE: Chronic | ICD-10-CM

## 2020-04-21 PROBLEM — G43.909 MIGRAINE, UNSPECIFIED, NOT INTRACTABLE, WITHOUT STATUS MIGRAINOSUS: Chronic | Status: ACTIVE | Noted: 2019-12-05

## 2020-04-21 PROBLEM — L68.0 HIRSUTISM: Chronic | Status: ACTIVE | Noted: 2019-12-05

## 2020-04-21 LAB
ALBUMIN SERPL ELPH-MCNC: 4.4 G/DL — SIGNIFICANT CHANGE UP (ref 3.3–5)
ALP SERPL-CCNC: 91 U/L — SIGNIFICANT CHANGE UP (ref 40–120)
ALT FLD-CCNC: 14 U/L — SIGNIFICANT CHANGE UP (ref 10–45)
ANION GAP SERPL CALC-SCNC: 12 MMOL/L — SIGNIFICANT CHANGE UP (ref 5–17)
APPEARANCE UR: CLEAR — SIGNIFICANT CHANGE UP
AST SERPL-CCNC: 15 U/L — SIGNIFICANT CHANGE UP (ref 10–40)
BASOPHILS # BLD AUTO: 0.04 K/UL — SIGNIFICANT CHANGE UP (ref 0–0.2)
BASOPHILS NFR BLD AUTO: 0.4 % — SIGNIFICANT CHANGE UP (ref 0–2)
BILIRUB SERPL-MCNC: 0.3 MG/DL — SIGNIFICANT CHANGE UP (ref 0.2–1.2)
BILIRUB UR-MCNC: NEGATIVE — SIGNIFICANT CHANGE UP
BUN SERPL-MCNC: 14 MG/DL — SIGNIFICANT CHANGE UP (ref 7–23)
CALCIUM SERPL-MCNC: 9.1 MG/DL — SIGNIFICANT CHANGE UP (ref 8.4–10.5)
CHLORIDE SERPL-SCNC: 102 MMOL/L — SIGNIFICANT CHANGE UP (ref 96–108)
CO2 SERPL-SCNC: 24 MMOL/L — SIGNIFICANT CHANGE UP (ref 22–31)
COLOR SPEC: YELLOW — SIGNIFICANT CHANGE UP
CREAT SERPL-MCNC: 0.63 MG/DL — SIGNIFICANT CHANGE UP (ref 0.5–1.3)
DIFF PNL FLD: NEGATIVE — SIGNIFICANT CHANGE UP
EOSINOPHIL # BLD AUTO: 0.41 K/UL — SIGNIFICANT CHANGE UP (ref 0–0.5)
EOSINOPHIL NFR BLD AUTO: 4.3 % — SIGNIFICANT CHANGE UP (ref 0–6)
GLUCOSE SERPL-MCNC: 97 MG/DL — SIGNIFICANT CHANGE UP (ref 70–99)
GLUCOSE UR QL: NEGATIVE — SIGNIFICANT CHANGE UP
HCG UR QL: NEGATIVE — SIGNIFICANT CHANGE UP
HCT VFR BLD CALC: 37.2 % — SIGNIFICANT CHANGE UP (ref 34.5–45)
HGB BLD-MCNC: 12 G/DL — SIGNIFICANT CHANGE UP (ref 11.5–15.5)
IMM GRANULOCYTES NFR BLD AUTO: 0.3 % — SIGNIFICANT CHANGE UP (ref 0–1.5)
KETONES UR-MCNC: NEGATIVE — SIGNIFICANT CHANGE UP
LEUKOCYTE ESTERASE UR-ACNC: NEGATIVE — SIGNIFICANT CHANGE UP
LYMPHOCYTES # BLD AUTO: 2.08 K/UL — SIGNIFICANT CHANGE UP (ref 1–3.3)
LYMPHOCYTES # BLD AUTO: 21.8 % — SIGNIFICANT CHANGE UP (ref 13–44)
MCHC RBC-ENTMCNC: 28.8 PG — SIGNIFICANT CHANGE UP (ref 27–34)
MCHC RBC-ENTMCNC: 32.3 GM/DL — SIGNIFICANT CHANGE UP (ref 32–36)
MCV RBC AUTO: 89.4 FL — SIGNIFICANT CHANGE UP (ref 80–100)
MONOCYTES # BLD AUTO: 0.46 K/UL — SIGNIFICANT CHANGE UP (ref 0–0.9)
MONOCYTES NFR BLD AUTO: 4.8 % — SIGNIFICANT CHANGE UP (ref 2–14)
NEUTROPHILS # BLD AUTO: 6.5 K/UL — SIGNIFICANT CHANGE UP (ref 1.8–7.4)
NEUTROPHILS NFR BLD AUTO: 68.4 % — SIGNIFICANT CHANGE UP (ref 43–77)
NITRITE UR-MCNC: NEGATIVE — SIGNIFICANT CHANGE UP
NRBC # BLD: 0 /100 WBCS — SIGNIFICANT CHANGE UP (ref 0–0)
PH UR: 6 — SIGNIFICANT CHANGE UP (ref 5–8)
PLATELET # BLD AUTO: 365 K/UL — SIGNIFICANT CHANGE UP (ref 150–400)
POTASSIUM SERPL-MCNC: 4 MMOL/L — SIGNIFICANT CHANGE UP (ref 3.5–5.3)
POTASSIUM SERPL-SCNC: 4 MMOL/L — SIGNIFICANT CHANGE UP (ref 3.5–5.3)
PROT SERPL-MCNC: 7.3 G/DL — SIGNIFICANT CHANGE UP (ref 6–8.3)
PROT UR-MCNC: ABNORMAL
RBC # BLD: 4.16 M/UL — SIGNIFICANT CHANGE UP (ref 3.8–5.2)
RBC # FLD: 13.1 % — SIGNIFICANT CHANGE UP (ref 10.3–14.5)
SARS-COV-2 RNA SPEC QL NAA+PROBE: SIGNIFICANT CHANGE UP
SODIUM SERPL-SCNC: 138 MMOL/L — SIGNIFICANT CHANGE UP (ref 135–145)
SP GR SPEC: 1.05 — HIGH (ref 1.01–1.02)
UROBILINOGEN FLD QL: ABNORMAL
WBC # BLD: 9.52 K/UL — SIGNIFICANT CHANGE UP (ref 3.8–10.5)
WBC # FLD AUTO: 9.52 K/UL — SIGNIFICANT CHANGE UP (ref 3.8–10.5)

## 2020-04-21 PROCEDURE — 76830 TRANSVAGINAL US NON-OB: CPT | Mod: 26

## 2020-04-21 PROCEDURE — 81001 URINALYSIS AUTO W/SCOPE: CPT

## 2020-04-21 PROCEDURE — 93975 VASCULAR STUDY: CPT

## 2020-04-21 PROCEDURE — 93975 VASCULAR STUDY: CPT | Mod: 26

## 2020-04-21 PROCEDURE — 85027 COMPLETE CBC AUTOMATED: CPT

## 2020-04-21 PROCEDURE — 80053 COMPREHEN METABOLIC PANEL: CPT

## 2020-04-21 PROCEDURE — 99284 EMERGENCY DEPT VISIT MOD MDM: CPT | Mod: 25

## 2020-04-21 PROCEDURE — 99285 EMERGENCY DEPT VISIT HI MDM: CPT

## 2020-04-21 PROCEDURE — 81025 URINE PREGNANCY TEST: CPT

## 2020-04-21 PROCEDURE — 99053 MED SERV 10PM-8AM 24 HR FAC: CPT

## 2020-04-21 PROCEDURE — 87086 URINE CULTURE/COLONY COUNT: CPT

## 2020-04-21 PROCEDURE — 76830 TRANSVAGINAL US NON-OB: CPT

## 2020-04-21 PROCEDURE — 87635 SARS-COV-2 COVID-19 AMP PRB: CPT

## 2020-04-21 RX ORDER — ONDANSETRON 8 MG/1
4 TABLET, FILM COATED ORAL ONCE
Refills: 0 | Status: COMPLETED | OUTPATIENT
Start: 2020-04-21 | End: 2020-04-21

## 2020-04-21 RX ORDER — SODIUM CHLORIDE 9 MG/ML
1000 INJECTION INTRAMUSCULAR; INTRAVENOUS; SUBCUTANEOUS ONCE
Refills: 0 | Status: COMPLETED | OUTPATIENT
Start: 2020-04-21 | End: 2020-04-21

## 2020-04-21 RX ORDER — ACETAMINOPHEN 500 MG
650 TABLET ORAL ONCE
Refills: 0 | Status: COMPLETED | OUTPATIENT
Start: 2020-04-21 | End: 2020-04-21

## 2020-04-21 RX ADMIN — ONDANSETRON 4 MILLIGRAM(S): 8 TABLET, FILM COATED ORAL at 06:34

## 2020-04-21 RX ADMIN — SODIUM CHLORIDE 1000 MILLILITER(S): 9 INJECTION INTRAMUSCULAR; INTRAVENOUS; SUBCUTANEOUS at 06:44

## 2020-04-21 RX ADMIN — Medication 650 MILLIGRAM(S): at 06:44

## 2020-04-21 NOTE — ED ADULT NURSE REASSESSMENT NOTE - NS ED NURSE REASSESS COMMENT FT1
US tech at bedside for exam. Pt ambulatory to restroom. Steady gait noted. NAD.   US tech at bedside for exam.

## 2020-04-21 NOTE — ED ADULT NURSE REASSESSMENT NOTE - NS ED NURSE REASSESS COMMENT FT1
Report received from ANSHUL MIMS. Pt AAOx4, NAD, resp nonlabored, skin warm/dry, resting comfortably in bed. Pt denies headache, dizziness, chest pain, palpitations, SOB, abd pain, n/v/d, urinary symptoms, fevers, chills, weakness at this time. Pt awaiting lab results/US exam. Safety maintained with call bell within reach. Report received from ANSHUL MIMS. Pt AAOx4, NAD, resp nonlabored, skin warm/dry, resting comfortably in bed. Pt reports mild suprapubic pain and nausea at this time. Pt denies headache, dizziness, chest pain, palpitations, SOB, abd pain, urinary symptoms, fevers, chills, weakness at this time. Pt awaiting lab results/US exam. Safety maintained with call bell within reach.

## 2020-04-21 NOTE — ED PROVIDER NOTE - ATTENDING CONTRIBUTION TO CARE
*** PATIENT SEEN DURING COVID PANDEMIC -- [place] IN STATE OF [facility] -- HOSPITAL RESOURCES ARE CRITICALLY OVERWHELMED -- NORMAL DECISION MAKING PROCESS IS SIGNIFICANTLY ALTERED -- RISK/BENEFIT ANALYSIS FAVORS DISCHARGE/OUTPATIENT MANAGEMENT IN MANY CASES THAT WOULD NORMALLY BE ADMITTED ***     25F, pmh pcos, ovarian torsion, constipation, presents with nausea/vomiting/diarrhea, suprapubic pain onset this morning. Pain suprapubic, non-radiating, moderate, "aching," no clear provoking or alleviating factors. +Cough, rhinorrhea onset friday. 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 Currently menstruating, denies abnormal vag d/c, not sexually active. Denies dysuria, hematuria. No cp, sob.    PE: NAD, NCAT, MMM, Trachea midline, Normal conjunctiva, lungs CTAB, S1/S2 RRR, Normal perfusion, 2+ radial pulses bilat, Abdomen Soft, Mild suprapubic ttp, No rebound/guarding, No LE edema, No deformity of extremities, No rashes,  No focal motor or sensory deficits.     Sx most likely 2/2 viral illness/gastroenteritis, although given hx of ovarian torsion consider acute gyn pathology including torsion, also consider cystitis. Very low suspicion acute intra-abd surgical gi pathology. Check basic labs. Give symptomatic relief. Re-eval. - Dipesh Wray MD

## 2020-04-21 NOTE — ED PROVIDER NOTE - CLINICAL SUMMARY MEDICAL DECISION MAKING FREE TEXT BOX
25F hx pcos and torsion ovarian, anxiety and depression, constipation on Motility presents with n/v/d early this AM assoc with suprapubic pain. TVUS to eval for torsion and other ovarian pathologies, UA/UCx, zofran and tylenol for symptomatic relief and basic labs with U preg. Was covid neg 3 wks but lungs clear and not in resp distress.

## 2020-04-21 NOTE — ED PROVIDER NOTE - CARE PLAN
Principal Discharge DX:	Nausea and vomiting  Secondary Diagnosis:	Diarrhea  Secondary Diagnosis:	Lower abdominal pain

## 2020-04-21 NOTE — ED PROVIDER NOTE - NSFOLLOWUPINSTRUCTIONS_ED_ALL_ED_FT
Follow up with your PCP in 24-48 hours.   Call GI to make a follow up appointment as soon as possible.   May take Tylenol and Motrin as directed on the bottle for pain control.   Return to the ER if you develop any new or worsening symptoms such as chest pain, shortness of breath, numbness, weakness, abdominal pain, nausea, vomiting, or visual changes. For a 14 day period or until testing results as negative (you will be texted results per your request):  -Stay inside your home as much as possible, avoiding public places or public interaction  -Do not go to work   -If you do enter any public domain, at minimum wear a surgical mask at all times  -Even while indoors, attempt to remain isolated from other individuals such as family or friends, as much as possible  -Return to the Emergency Department for any symptoms such as worsening shortness of breath, significant worsening cough, abdominal pain, high fevers despite over the counter fever-reducing medications, or severe weakness/malaise

## 2020-04-21 NOTE — ED PROVIDER NOTE - PHYSICAL EXAMINATION
GEN: Well appearing, well nourished, in no apparent distress.  HEAD: NCAT  HEENT: PERRL, Airway patent, EOMI, non-erythematous pharynx, no exudates, uvula midline, MMM, neck supple, no LAD, no JVD  LUNG: CTAB, no adventitious sounds, no retractions, no nasal flaring  CV: RRR, no murmurs,   Abd: soft, +ttp suprapubic, ND, no rebound or guarding, BS+ in all quadrants, no CVAT  MSK: WWP, Pulses 2+ in extremities, No edema   Neuro:  AAOx3, Ambulatory with stable gait.  Skin: Warm and dry, no evidence of rash  Psych: normal mood and affect

## 2020-04-21 NOTE — ED PROVIDER NOTE - OBJECTIVE STATEMENT
25F hx pcos and torsion ovarian, anxiety and depression, constipation on pro-Motility drug presents with n/v/d early this AM assoc with suprapubic pain. Pain is achy, mild to moderate, non radiating, not completely relieved by tylenol at 2AM. emesis and diarrhea nbnb. Has also been having some coughing and rhinorrhea since Friday without diff breathing. Works on covid floor as tech. Currently on menstrual period, not heavier than usual. no urinary sx or discharge, not sexually active.

## 2020-04-21 NOTE — ED ADULT NURSE NOTE - OBJECTIVE STATEMENT
25 year old female presents to ED via walk-in complaining of n/v. Pt has positive COVID19 contact. Was tested 3 weeks ago, and resulted negative. No significant PMH. States she was at work, felt nausea, had one episode of vomiting, felt weak, and had two episode of diarrhea. Upon assessment A&O x4, ambulatory and well appearing. Breathing comfortably on room air, oxygen saturation 99% on room air. Last menstrual cycle current. Denies chest pain, SOB, abdominal pain, fevers, and chills. VS as documented. 25 year old female presents to ED via walk-in complaining of n/v. Pt has positive COVID19 contact. Was tested 3 weeks ago, and resulted negative. No significant PMH. States she was at work, felt nauseous, had one episode of vomiting, felt weak, and had two episode of diarrhea. Endorses cough and sinus congestion/nasal drip x1 week. Upon assessment A&O x4, ambulatory and well appearing. Breathing comfortably on room air, oxygen saturation 99% on room air. Last menstrual cycle current. Abdomen soft, and non-tender. Describes abdominal discomfort as "knotting" feeling associated with diarrhea. Denies chest pain, SOB, fevers, and chills. VS as documented.

## 2020-04-21 NOTE — ED PROVIDER NOTE - NS ED ROS FT
Constitutional: no fevers, no chills.  Eyes: no visual changes.  Ears: no ear drainage, no ear pain.  Nose: no nasal congestion.  Mouth/Throat: no sore throat.  Cardiovascular: no chest pain.  Respiratory: no shortness of breath, no wheezing, +cough  Gastrointestinal: +nausea, +vomiting, +diarrhea, +abdominal pain.  MSK: no flank pain, no back pain.  Genitourinary: no dysuria, no hematuria.  Skin: no rashes.  Neuro: no headache

## 2020-04-21 NOTE — ED PROVIDER NOTE - PROGRESS NOTE DETAILS
pt feeling better, states she has had this same abd pain on and off for months. tolerating po. abd non-tender. low suspicion for acute surgical abd, torsion (US negative), or covid. will dc with GI f/u and strict return precautions. pt feeling better, states she has had this same abd pain on and off for months. tolerating po. abd non-tender. low suspicion for acute surgical abd or torsion (US negative). will dc with GI f/u and strict return precautions. Will also test for COVID and give quarantine instructions as she awaits results.

## 2020-04-21 NOTE — ED ADULT NURSE NOTE - NSIMPLEMENTINTERV_GEN_ALL_ED
Implemented All Universal Safety Interventions:  [place] to call system. Call bell, personal items and telephone within reach. Instruct patient to call for assistance. Room bathroom lighting operational. Non-slip footwear when patient is off stretcher. Physically safe environment: no spills, clutter or unnecessary equipment. Stretcher in lowest position, wheels locked, appropriate side rails in place.

## 2020-04-22 LAB
CULTURE RESULTS: SIGNIFICANT CHANGE UP
SPECIMEN SOURCE: SIGNIFICANT CHANGE UP

## 2020-04-25 ENCOUNTER — MESSAGE (OUTPATIENT)
Age: 25
End: 2020-04-25

## 2020-05-28 NOTE — ED ADULT NURSE NOTE - CAS TRG GEN SKIN COLOR
Patient requesting the brand of tramadol that comes in a round pill form not the oval pill   Normal for race

## 2020-06-22 ENCOUNTER — EMERGENCY (EMERGENCY)
Facility: HOSPITAL | Age: 25
LOS: 1 days | Discharge: HOME | End: 2020-06-22
Attending: STUDENT IN AN ORGANIZED HEALTH CARE EDUCATION/TRAINING PROGRAM
Payer: COMMERCIAL

## 2020-06-22 VITALS
OXYGEN SATURATION: 99 % | SYSTOLIC BLOOD PRESSURE: 121 MMHG | DIASTOLIC BLOOD PRESSURE: 80 MMHG | HEART RATE: 95 BPM | WEIGHT: 164.91 LBS | TEMPERATURE: 98 F | RESPIRATION RATE: 20 BRPM | HEIGHT: 63 IN

## 2020-06-22 DIAGNOSIS — Z90.89 ACQUIRED ABSENCE OF OTHER ORGANS: Chronic | ICD-10-CM

## 2020-06-22 DIAGNOSIS — N83.519 TORSION OF OVARY AND OVARIAN PEDICLE, UNSPECIFIED SIDE: Chronic | ICD-10-CM

## 2020-06-22 PROCEDURE — 99053 MED SERV 10PM-8AM 24 HR FAC: CPT

## 2020-06-22 PROCEDURE — 99283 EMERGENCY DEPT VISIT LOW MDM: CPT

## 2020-06-22 RX ORDER — LIDOCAINE 4 G/100G
1 CREAM TOPICAL ONCE
Refills: 0 | Status: COMPLETED | OUTPATIENT
Start: 2020-06-22 | End: 2020-06-22

## 2020-06-22 RX ORDER — IBUPROFEN 200 MG
600 TABLET ORAL ONCE
Refills: 0 | Status: COMPLETED | OUTPATIENT
Start: 2020-06-22 | End: 2020-06-22

## 2020-06-22 RX ORDER — ACETAMINOPHEN 500 MG
975 TABLET ORAL ONCE
Refills: 0 | Status: COMPLETED | OUTPATIENT
Start: 2020-06-22 | End: 2020-06-22

## 2020-06-22 RX ADMIN — Medication 600 MILLIGRAM(S): at 05:00

## 2020-06-22 RX ADMIN — Medication 975 MILLIGRAM(S): at 05:00

## 2020-06-22 RX ADMIN — LIDOCAINE 1 PATCH: 4 CREAM TOPICAL at 04:59

## 2020-06-22 NOTE — ED PROVIDER NOTE - NS ED ROS FT
GENERAL: No fever or chills, EYES: no change in vision, HEENT: no trouble swallowing or speaking, CARDIAC: no chest pain, PULMONARY: no cough or SOB, GI: no abdominal pain, no nausea, no vomiting, no diarrhea or constipation, : No changes in urination, SKIN: no rashes, NEURO: no headache,  MSK: L shoulder pain ~Javy Vazquez M.D. Resident

## 2020-06-22 NOTE — ED PROVIDER NOTE - OBJECTIVE STATEMENT
26 yo F PMHx PCOS, anxiety, p/w L shoulder pain. 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 She reports pain to the anterior shoulder radiating down her arm. No numbness/weakness.

## 2020-06-22 NOTE — ED PROVIDER NOTE - NSFOLLOWUPCLINICS_GEN_ALL_ED_FT
[facility] Orthopedic Surgery  Orthopedic Surgery  [address], 3rd & 4th [place] [address]  Phone: [phone]  Fax:   Follow Up Time:

## 2020-06-22 NOTE — ED PROVIDER NOTE - PHYSICAL EXAMINATION
Gen: AAOx3, non-toxic  Head: NCAT  HEENT: EOMI, oral mucosa moist, normal conjunctiva  Lung: CTAB, no respiratory distress, no wheezes/rhonchi/rales B/L, speaking in full sentences  CV: RRR, no murmurs, rubs or gallops  Abd: soft, NTND, no guarding  MSK: no visible deformities, tenderness to anterior L shoulder, full shoulder ROM bilaterally  Neuro: No focal sensory or motor deficits, +5/5 , biceps, triceps strength bilaterally  Skin: Warm, well perfused, no rash  Psych: normal affect.   ~Javy Vazquez M.D. Resident

## 2020-06-22 NOTE — ED ADULT NURSE NOTE - NSIMPLEMENTINTERV_GEN_ALL_ED
Implemented All Universal Safety Interventions:  [place] to call system. Call bell, personal items and telephone within reach. Instruct patient to call for assistance. Room bathroom lighting operational. Non-slip footwear when patient is off stretcher. Physically safe environment: no spills, clutter or unnecessary equipment. Stretcher in lowest position, wheels locked, appropriate side rails in place.

## 2020-06-22 NOTE — ED PROVIDER NOTE - PATIENT PORTAL LINK FT
You can access the FollowMyHealth Patient Portal offered by [facility] by registering at the following website: [email or web address]. By joining [facility]’s FollowMyHealth portal, you will also be able to view your health information using other applications (apps) compatible with our system.

## 2020-06-22 NOTE — ED PROVIDER NOTE - NSFOLLOWUPINSTRUCTIONS_ED_ALL_ED_FT
You were seen in the Emergency Department for shoulder pain.  1) Advance activity as tolerated.    2) Continue all previously prescribed medications as directed.  You may take 600 mg ibuprofen every 6 hours as needed for pain. Take this medication with food. You may also take 1000 mg tylenol every 6 hours as needed for pain.  3) Follow up with an orthopedist this week - take copies of your results.    4) Return to the Emergency Department for worsening or persistent symptoms, and/or ANY NEW OR CONCERNING SYMPTOMS. If you have issues obtaining follow up, please call: [phone] to obtain a doctor or specialist who takes your insurance in your area.

## 2020-06-22 NOTE — ED PROVIDER NOTE - ATTENDING CONTRIBUTION TO CARE
25 YOF here with L shoulder pain. 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 ROM intact, neurovascularly intact, well appearing. likely MSK strain. analgesia and PMD f/u

## 2020-06-22 NOTE — ED PROVIDER NOTE - CLINICAL SUMMARY MEDICAL DECISION MAKING FREE TEXT BOX
24 yo F PMHx PCOS, anxiety, p/w L shoulder pain after straining her arm at work, no concern for fracture, no neurovascular compromise on exam, likely MSK strain, will give pain medication and DC with ortho follow up

## 2020-11-14 NOTE — ED ADULT TRIAGE NOTE - CCCP TRG CHIEF CMPLNT
History of Present Illness


General


Chief Complaint:  Dizziness


Source:  Patient, Friend - girlfriend





Present Illness


HPI


Patient is a 65-year-old male past medical history of hypertension who was 

brought in by his girlfriend for weakness.  History is very unclear and I have 

spoke with the patient and his girlfriend to try to obtain an accurate story.  

Patient states that he suffers from incontinence.  He wakes up frequently to use

the bathroom.  Patient's girlfriend states they went to bed at 9 PM last night 

and that the patient was normal.  She states that he woke up several times in 

the middle of the night to use his urinal.  She states that at approximately 4

:41 AM he got up to have a bowel movement.  She states that she helped him back 

to bed and noted some slurred speech.  She states the patient had complained of 

dizziness.  She states that patient has difficulty walking chronically due to 

left knee pain which is unchanged.  She states that she noted that he was weak 

all over but did not note any focal weakness.  Patient is alert and oriented x3.

 He states that he was brought in because he felt weak all over his body.  He 

denies having dizziness or slurred speech.  Patient does state that he has 

chronic left knee pain and has some sort of a muscle issue in his right groin.  

He denies having headache or blurry vision.  He denies having chest pain or 

shortness of breath.  He denies having any focal weakness or strokelike 

symptoms.  He does not remember the same story that the girlfriend gave.  They 

were driven here by a friend.  Patient is not on any blood thinners or aspirin.


Allergies:  


Coded Allergies:  


     ATENOLOL (Verified  Allergy, Unknown, 2/21/17)


     LISINOPRIL (Verified  Allergy, Unknown, Itching, 9/13/12)





COVID-19 Screening


Contact w/high risk pt:  No


Recent Travel to affected area:  No


Experienced COVID-19 symptoms?:  No


COVID-19 Testing performed PTA:  No





Patient History


Reviewed Nursing Documentation:  PMH: Agreed; PSxH: Agreed





Nursing Documentation-PMH


Past Medical History:  No History, Except For


Hx Cardiac Problems:  No


Hx Hypertension:  Yes


Hx Pacemaker:  No


Hx Asthma:  No


Hx COPD:  No


Hx Diabetes:  Yes


Hx Cancer:  No


Hx Gastrointestinal Problems:  No


Hx Dialysis:  No


Hx Neurological Problems:  No


Hx Cerebrovascular Accident:  No


Hx Seizures:  No





Review of Systems


All Other Systems:  negative except mentioned in HPI





Physical Exam





Vital Signs








  Date Time  Temp Pulse Resp B/P (MAP) Pulse Ox O2 Delivery O2 Flow Rate FiO2


 


11/14/20 05:52 98.2 76 16 150/100 (117) 98 Room Air  








Sp02 EP Interpretation:  reviewed, normal


General Appearance:  no apparent distress, alert, GCS 15, non-toxic


Head:  normocephalic, atraumatic


Eyes:  bilateral eye normal inspection, bilateral eye PERRL


ENT:  hearing grossly normal, normal pharynx, no angioedema, normal voice


Neck:  full range of motion, supple/symm/no masses


Respiratory:  chest non-tender, lungs clear, normal breath sounds, speaking full

sentences


Cardiovascular #1:  regular rate, rhythm, no edema


Gastrointestinal:  normal bowel sounds, non tender, soft, non-distended, no 

guarding, no rebound


Rectal:  deferred


Musculoskeletal:  other - Patient able to walk but little bit antalgic due to 

his left knee pain no signs of septic joint states the pain is chronic


Neurologic:  CNs III-XII nml as tested, oriented x3, other - Very poor historian

speech appears normal to me


Psychiatric:  no suicidal/homicidal ideation


Skin:  no rash


Lymphatic:  no adenopathy





Procedures


Critical Care Time


Critical Care Time


Total critical care time: Approximately 35 minutes.  Due to a high probability 

of clinically significant, life threatening deterioration, the patient required 

my highest level of preparedness to intervene emergently and I personally spent 

this critical care time directly and personally managing the patient. This 

critical care time included obtaining a history; examining the patient; pulse 

oximetry; ordering and review of studies; arranging urgent treatment with 

development of a management plan; evaluation of patient's response to treatment;

frequent reassessment; and, discussions with other providers.This critical care 

time was performed to assess and manage the high probability of imminent, life-

threatening deterioration that could result in multi-organ failure. It was 

exclusive of separately billable procedures and treating other patients and 

teaching time.


Please see MDM section and the rest of the note for further information on 

patient assessment and treatment.





Medical Decision Making


Diagnostic Impression:  


   Primary Impression:  


   Dizziness


   Additional Impression:  


   TIA (transient ischemic attack)


ER Course


Patient is very unclear of the history as is his girlfriend.  Very difficult to 

follow the exact reasoning for his ER visit.  I ordered CTA of the head and 

neck.  Patient states something about allergies and is refusing to receive 

contrast.  He said he has never had a CT but that has an allergy he said that he

will obtain a CAT scan without contrast.  CT without contrast demonstrates mild 

encephalomalacia in the left frontal lobe consistent with old infarct.  No 

evidence for intracranial hemorrhage.  I have ordered for oral aspirin.  Patient

can obtain MRIs for further evaluation.  Patient is not a TPA candidate as it 

has been greater than 6 hours since last known normal.





Patient's labs demonstrate no significant acute abnormalities.  Patient's 

history is very unclear.  Per girlfriend patient did have slurred speech but 

patient denies having that he also denies having dizziness which she states he 

had.  Patient refusing CTs with contrast.  Patient will need MRIs and further 

TIA work-up.  Patient will be transferred to [facility] for further 

treatment and evaluation.





I spoke with the accepting physician at [facility].  She will be 

accepting transfer for TIA workup.





Laboratory Tests








Test


 11/14/20


06:10 11/14/20


06:21


 


White Blood Count


 5.6 K/UL


(4.8-10.8) 





 


Red Blood Count


 4.36 M/UL


(4.70-6.10)  L 





 


Hemoglobin


 14.8 G/DL


(14.2-18.0) 





 


Hematocrit


 44.7 %


(42.0-52.0) 





 


Mean Corpuscular Volume


 103 FL (80-99)


H 





 


Mean Corpuscular Hemoglobin


 33.8 PG


(27.0-31.0)  H 





 


Mean Corpuscular Hemoglobin


Concent 33.0 G/DL


(32.0-36.0) 





 


Red Cell Distribution Width


 11.0 %


(11.6-14.8)  L 





 


Platelet Count


 151 K/UL


(150-450) 





 


Mean Platelet Volume


 8.8 FL


(6.5-10.1) 





 


Neutrophils (%) (Auto)


 58.1 %


(45.0-75.0) 





 


Lymphocytes (%) (Auto)


 28.5 %


(20.0-45.0) 





 


Monocytes (%) (Auto)


 8.0 %


(1.0-10.0) 





 


Eosinophils (%) (Auto)


 4.8 %


(0.0-3.0)  H 





 


Basophils (%) (Auto)


 0.5 %


(0.0-2.0) 





 


Prothrombin Time


 10.9 SEC


(9.30-11.50) 





 


Prothrombin Time INR 1.0 (0.9-1.1)   


 


Activated Partial


Thromboplast Time 23 SEC (23-33)


 





 


Sodium Level


 138 MMOL/L


(136-145) 





 


Potassium Level


 3.8 MMOL/L


(3.5-5.1) 





 


Chloride Level


 104 MMOL/L


() 





 


Carbon Dioxide Level


 30 MMOL/L


(21-32) 





 


Anion Gap


 4 mmol/L


(5-15)  L 





 


Blood Urea Nitrogen


 11 mg/dL


(7-18) 





 


Creatinine


 1.4 MG/DL


(0.55-1.30)  H 





 


Estimated Glomerular


Filtration Rate > 60 mL/min


(>60) 





 


Glucose Level


 155 MG/DL


()  H 





 


Calcium Level


 10.3 MG/DL


(8.5-10.1)  H 





 


Total Bilirubin


 0.6 MG/DL


(0.2-1.0) 





 


Aspartate Amino Transferase


(AST) 17 U/L (15-37)


 





 


Alanine Aminotransferase (ALT)


 23 U/L (12-78)


 





 


Alkaline Phosphatase


 119 U/L


()  H 





 


Troponin I


 0.009 ng/mL


(0.000-0.056) 





 


Total Protein


 7.3 G/DL


(6.4-8.2) 





 


Albumin


 3.5 G/DL


(3.4-5.0) 





 


Globulin 3.8 g/dL   


 


Albumin/Globulin Ratio


 0.9 (1.0-2.7)


L 





 


Triglycerides Level


 49 MG/DL


() 





 


Cholesterol Level


 190 MG/DL (<


200) 





 


LDL Cholesterol


 111 mg/dL


(<100)  H 





 


HDL Cholesterol


 66 MG/DL


(40-60)  H 





 


Cholesterol/HDL Ratio


 2.9 (3.3-4.4)


L 





 


Urine Opiates Screen


 Negative


(NEGATIVE) 





 


Urine Barbiturates Screen


 Negative


(NEGATIVE) 





 


Phencyclidine (PCP) Screen


 Negative


(NEGATIVE) 





 


Urine Amphetamines Screen


 Negative


(NEGATIVE) 





 


Urine Benzodiazepines Screen


 Negative


(NEGATIVE) 





 


Urine Cocaine Screen


 Negative


(NEGATIVE) 





 


Urine Marijuana (THC) Screen


 Negative


(NEGATIVE) 





 


Urine Color  Pale yellow  


 


Urine Appearance


 


 Slightly


cloudy


 


Urine pH  6.5 (4.5-8.0)  


 


Urine Specific Gravity


 


 1.015


(1.005-1.035)


 


Urine Protein


 


 1+ (NEGATIVE)


H


 


Urine Glucose (UA)


 


 Negative


(NEGATIVE)


 


Urine Ketones


 


 Negative


(NEGATIVE)


 


Urine Blood


 


 Negative


(NEGATIVE)


 


Urine Nitrite


 


 Negative


(NEGATIVE)


 


Urine Bilirubin


 


 Negative


(NEGATIVE)


 


Urine Urobilinogen


 


 Normal MG/DL


(0.0-1.0)


 


Urine Leukocyte Esterase


 


 Negative


(NEGATIVE)


 


Urine RBC


 


 0 /HPF (0 - 0)





 


Urine WBC


 


 0 /HPF (0 - 0)





 


Urine Squamous Epithelial


Cells 


 Occasional


/LPF


 


Urine Amorphous Sediment


 


 Few /LPF


(NONE)  H


 


Urine Bacteria


 


 Occasional


/HPF (NONE)








Microbiology








 Date/Time


Source Procedure


Growth Status





 


 11/14/20 07:26


Nasopharynx SARS-CoV-2 RdRp Gene Assay - Final Complete








EKG Diagnostic Results


Troponin ordered:  Yes


When was troponin ordered?:  Nov 14, 2020


EKG Time:  06:47


EP Interpretation:  Cathy Zuluaga MD


Rate:  normal - 74 bpm


ST Segments:  no acute changes


ASA given to the pt in ED:  No





Rhythm Strip Diag. Results


Rhythm Strip Time:  07:06


EP Interpretation:  yes - Cathy Zuluaga MD


Rate:  67 bpm


Rhythm:  NSR, no PVC's, no ectopy





Chest X-Ray Diagnostic Results


Chest X-Ray Diagnostic Results :  


   Chest X-Ray Ordered:  Yes


   # of Views/Limited/Complete:  1 View


   Indication:  Other - cva


   EP Interpretation:  Yes


   Interpretation:  no consolidation, no effusion, no pneumothorax, no acute 

cardiopulmonary disease


   Impression:  No acute disease


   Electronically Signed by:  Cathy Zuluaga MD





Last Vital Signs








  Date Time  Temp Pulse Resp B/P (MAP) Pulse Ox O2 Delivery O2 Flow Rate FiO2


 


11/14/20 05:52 98.2 76 16 150/100 (117) 98 Room Air  








Disposition:  ADMITTED AS INPATIENT - [facility]


Condition:  Critical


Referrals:  


[facility],REFERRING (PCP)





Additional Instructions:  


Please note that this report is being documented using DRAGON technology.


This can lead to erroneous entry secondary to incorrect interpretation by the 

dictating instrument.











Cathy Zuluaga M.D.        Nov 14, 2020 07:06 needle stick

## 2021-01-15 ENCOUNTER — TRANSCRIPTION ENCOUNTER (OUTPATIENT)
Age: 26
End: 2021-01-15

## 2021-01-16 ENCOUNTER — EMERGENCY (EMERGENCY)
Facility: HOSPITAL | Age: 26
LOS: 1 days | Discharge: HOME | End: 2021-01-16
Attending: EMERGENCY MEDICINE
Payer: COMMERCIAL

## 2021-01-16 VITALS
TEMPERATURE: 98 F | SYSTOLIC BLOOD PRESSURE: 112 MMHG | OXYGEN SATURATION: 97 % | DIASTOLIC BLOOD PRESSURE: 80 MMHG | RESPIRATION RATE: 22 BRPM | HEART RATE: 94 BPM

## 2021-01-16 VITALS
HEART RATE: 108 BPM | OXYGEN SATURATION: 99 % | HEIGHT: 63 IN | TEMPERATURE: 98 F | WEIGHT: 164.91 LBS | DIASTOLIC BLOOD PRESSURE: 81 MMHG | RESPIRATION RATE: 22 BRPM | SYSTOLIC BLOOD PRESSURE: 104 MMHG

## 2021-01-16 DIAGNOSIS — Z90.89 ACQUIRED ABSENCE OF OTHER ORGANS: Chronic | ICD-10-CM

## 2021-01-16 DIAGNOSIS — N83.519 TORSION OF OVARY AND OVARIAN PEDICLE, UNSPECIFIED SIDE: Chronic | ICD-10-CM

## 2021-01-16 LAB
ALBUMIN SERPL ELPH-MCNC: 4.3 G/DL — SIGNIFICANT CHANGE UP (ref 3.3–5)
ALP SERPL-CCNC: 82 U/L — SIGNIFICANT CHANGE UP (ref 40–120)
ALT FLD-CCNC: 32 U/L — SIGNIFICANT CHANGE UP (ref 10–45)
ANION GAP SERPL CALC-SCNC: 12 MMOL/L — SIGNIFICANT CHANGE UP (ref 5–17)
APTT BLD: 34 SEC — SIGNIFICANT CHANGE UP (ref 27.5–35.5)
AST SERPL-CCNC: 25 U/L — SIGNIFICANT CHANGE UP (ref 10–40)
BASOPHILS # BLD AUTO: 0.02 K/UL — SIGNIFICANT CHANGE UP (ref 0–0.2)
BASOPHILS NFR BLD AUTO: 0.4 % — SIGNIFICANT CHANGE UP (ref 0–2)
BILIRUB SERPL-MCNC: 0.2 MG/DL — SIGNIFICANT CHANGE UP (ref 0.2–1.2)
BUN SERPL-MCNC: 11 MG/DL — SIGNIFICANT CHANGE UP (ref 7–23)
CALCIUM SERPL-MCNC: 9.1 MG/DL — SIGNIFICANT CHANGE UP (ref 8.4–10.5)
CHLORIDE SERPL-SCNC: 100 MMOL/L — SIGNIFICANT CHANGE UP (ref 96–108)
CO2 SERPL-SCNC: 25 MMOL/L — SIGNIFICANT CHANGE UP (ref 22–31)
CREAT SERPL-MCNC: 0.76 MG/DL — SIGNIFICANT CHANGE UP (ref 0.5–1.3)
EOSINOPHIL # BLD AUTO: 0.13 K/UL — SIGNIFICANT CHANGE UP (ref 0–0.5)
EOSINOPHIL NFR BLD AUTO: 2.5 % — SIGNIFICANT CHANGE UP (ref 0–6)
GLUCOSE SERPL-MCNC: 130 MG/DL — HIGH (ref 70–99)
HCT VFR BLD CALC: 45.3 % — HIGH (ref 34.5–45)
HGB BLD-MCNC: 15.1 G/DL — SIGNIFICANT CHANGE UP (ref 11.5–15.5)
IMM GRANULOCYTES NFR BLD AUTO: 0.2 % — SIGNIFICANT CHANGE UP (ref 0–1.5)
INR BLD: 1.06 RATIO — SIGNIFICANT CHANGE UP (ref 0.88–1.16)
LYMPHOCYTES # BLD AUTO: 1.54 K/UL — SIGNIFICANT CHANGE UP (ref 1–3.3)
LYMPHOCYTES # BLD AUTO: 29.3 % — SIGNIFICANT CHANGE UP (ref 13–44)
MCHC RBC-ENTMCNC: 29 PG — SIGNIFICANT CHANGE UP (ref 27–34)
MCHC RBC-ENTMCNC: 33.3 GM/DL — SIGNIFICANT CHANGE UP (ref 32–36)
MCV RBC AUTO: 86.9 FL — SIGNIFICANT CHANGE UP (ref 80–100)
MONOCYTES # BLD AUTO: 0.35 K/UL — SIGNIFICANT CHANGE UP (ref 0–0.9)
MONOCYTES NFR BLD AUTO: 6.7 % — SIGNIFICANT CHANGE UP (ref 2–14)
NEUTROPHILS # BLD AUTO: 3.2 K/UL — SIGNIFICANT CHANGE UP (ref 1.8–7.4)
NEUTROPHILS NFR BLD AUTO: 60.9 % — SIGNIFICANT CHANGE UP (ref 43–77)
NRBC # BLD: 0 /100 WBCS — SIGNIFICANT CHANGE UP (ref 0–0)
PLATELET # BLD AUTO: 245 K/UL — SIGNIFICANT CHANGE UP (ref 150–400)
POTASSIUM SERPL-MCNC: 3.6 MMOL/L — SIGNIFICANT CHANGE UP (ref 3.5–5.3)
POTASSIUM SERPL-SCNC: 3.6 MMOL/L — SIGNIFICANT CHANGE UP (ref 3.5–5.3)
PROT SERPL-MCNC: 8 G/DL — SIGNIFICANT CHANGE UP (ref 6–8.3)
PROTHROM AB SERPL-ACNC: 12.7 SEC — SIGNIFICANT CHANGE UP (ref 10.6–13.6)
RBC # BLD: 5.21 M/UL — HIGH (ref 3.8–5.2)
RBC # FLD: 12.5 % — SIGNIFICANT CHANGE UP (ref 10.3–14.5)
SARS-COV-2 RNA SPEC QL NAA+PROBE: DETECTED
SODIUM SERPL-SCNC: 137 MMOL/L — SIGNIFICANT CHANGE UP (ref 135–145)
WBC # BLD: 5.25 K/UL — SIGNIFICANT CHANGE UP (ref 3.8–10.5)
WBC # FLD AUTO: 5.25 K/UL — SIGNIFICANT CHANGE UP (ref 3.8–10.5)

## 2021-01-16 PROCEDURE — 93010 ELECTROCARDIOGRAM REPORT: CPT

## 2021-01-16 PROCEDURE — 85730 THROMBOPLASTIN TIME PARTIAL: CPT

## 2021-01-16 PROCEDURE — 93005 ELECTROCARDIOGRAM TRACING: CPT

## 2021-01-16 PROCEDURE — 85025 COMPLETE CBC W/AUTO DIFF WBC: CPT

## 2021-01-16 PROCEDURE — 80053 COMPREHEN METABOLIC PANEL: CPT

## 2021-01-16 PROCEDURE — 99284 EMERGENCY DEPT VISIT MOD MDM: CPT | Mod: 25

## 2021-01-16 PROCEDURE — 99284 EMERGENCY DEPT VISIT MOD MDM: CPT

## 2021-01-16 PROCEDURE — 85379 FIBRIN DEGRADATION QUANT: CPT

## 2021-01-16 PROCEDURE — 71045 X-RAY EXAM CHEST 1 VIEW: CPT | Mod: 26

## 2021-01-16 PROCEDURE — U0003: CPT

## 2021-01-16 PROCEDURE — 71045 X-RAY EXAM CHEST 1 VIEW: CPT

## 2021-01-16 PROCEDURE — 85610 PROTHROMBIN TIME: CPT

## 2021-01-16 PROCEDURE — 96365 THER/PROPH/DIAG IV INF INIT: CPT

## 2021-01-16 PROCEDURE — U0005: CPT

## 2021-01-16 RX ORDER — DIPHENHYDRAMINE HCL 50 MG
50 CAPSULE ORAL ONCE
Refills: 0 | Status: COMPLETED | OUTPATIENT
Start: 2021-01-16 | End: 2021-01-16

## 2021-01-16 RX ORDER — SODIUM CHLORIDE 9 MG/ML
1000 INJECTION INTRAMUSCULAR; INTRAVENOUS; SUBCUTANEOUS ONCE
Refills: 0 | Status: COMPLETED | OUTPATIENT
Start: 2021-01-16 | End: 2021-01-16

## 2021-01-16 RX ADMIN — Medication 200 MILLIGRAM(S): at 19:17

## 2021-01-16 RX ADMIN — Medication 200 MILLIGRAM(S): at 19:35

## 2021-01-16 RX ADMIN — SODIUM CHLORIDE 1000 MILLILITER(S): 9 INJECTION INTRAMUSCULAR; INTRAVENOUS; SUBCUTANEOUS at 19:18

## 2021-01-16 RX ADMIN — SODIUM CHLORIDE 1000 MILLILITER(S): 9 INJECTION INTRAMUSCULAR; INTRAVENOUS; SUBCUTANEOUS at 19:35

## 2021-01-16 NOTE — ED PROVIDER NOTE - OBJECTIVE STATEMENT
25 year old F on +COVID-19 on Day 7 with no significant pmhx presents to ED c/o SOB since yesterday, worsening today. Pt endorses substernal CP with talking and tachycardia up to the 150s. States that pulse ox read 89-90% although pulse ox here is 100% on RA. Tolerating PO and urinating well. Pt on OCPs.

## 2021-01-16 NOTE — ED PROVIDER NOTE - CARE PLAN
Principal Discharge DX:	COVID-19  Secondary Diagnosis:	Sinus tachycardia  Secondary Diagnosis:	Anxiety and depression

## 2021-01-16 NOTE — ED PROVIDER NOTE - PATIENT PORTAL LINK FT
You can access the FollowMyHealth Patient Portal offered by [facility] by registering at the following website: [email or web address]. By joining [employer]’s FollowMyHealth portal, you will also be able to view your health information using other applications (apps) compatible with our system.

## 2021-01-16 NOTE — ED ADULT TRIAGE NOTE - CHIEF COMPLAINT QUOTE
shortness of breath since yesterday worse today  COVID positive  had sternal chest pain when walking in

## 2021-01-16 NOTE — ED ADULT NURSE REASSESSMENT NOTE - NS ED NURSE REASSESS COMMENT FT1
Report received from Caro LANDERS. Pt feels comfortable with plan to d/c home per Mary GRANT.
19:10 c/o chest pain associated with tacypnea   Dr ruiz aware symptoms resolved spntaneously. pt med with solumedrol as ordered.

## 2021-01-16 NOTE — ED PROVIDER NOTE - NSFOLLOWUPINSTRUCTIONS_ED_ALL_ED_FT
1. You have covid viral pneumonia.  This will cause malaise, fatigue, fever, chest pains, shortness of breath, poor appetite and many other symptoms.  These can last 2+ weeks.  2. Tylenol every 6 hours for pains/fever.  Keep up on fluids.  Rest.  3. Follow up PCP.

## 2021-01-16 NOTE — ED PROVIDER NOTE - PROGRESS NOTE DETAILS
Dr. Hernandez Note: pt's tachycardia seems a combination of anxiety and underlying viral infection.  PT's HR will range from 100 up to 150s when more anxious and then back down to 100.  Pt still feels malaised and does not feel much improvement, will attempt to set proper expectations in setting of covid infection.  D-dimer negative and given pt as known allergic rxn to contrast, will NOT proceed with ct angio (risk >>> benefit), will obtain cxr r/o occult ptx o/w dc outpt.

## 2021-01-16 NOTE — ED ADULT NURSE NOTE - NSIMPLEMENTINTERV_GEN_ALL_ED
Implemented All Universal Safety Interventions:  [place] to call system. Call bell, personal items and telephone within reach. Instruct patient to call for assistance. Room bathroom lighting operational. Non-slip footwear when patient is off stretcher. Physically safe environment: no spills, clutter or unnecessary equipment. Stretcher in lowest position, wheels locked, appropriate side rails in place.

## 2021-01-20 ENCOUNTER — APPOINTMENT (OUTPATIENT)
Dept: INTERNAL MEDICINE | Facility: CLINIC | Age: 26
End: 2021-01-20
Payer: COMMERCIAL

## 2021-01-20 DIAGNOSIS — Z80.9 FAMILY HISTORY OF MALIGNANT NEOPLASM, UNSPECIFIED: ICD-10-CM

## 2021-01-20 DIAGNOSIS — Z83.79 FAMILY HISTORY OF OTHER DISEASES OF THE DIGESTIVE SYSTEM: ICD-10-CM

## 2021-01-20 DIAGNOSIS — Z72.0 TOBACCO USE: ICD-10-CM

## 2021-01-20 DIAGNOSIS — Z78.9 OTHER SPECIFIED HEALTH STATUS: ICD-10-CM

## 2021-01-20 DIAGNOSIS — Z86.69 PERSONAL HISTORY OF OTHER DISEASES OF THE NERVOUS SYSTEM AND SENSE ORGANS: ICD-10-CM

## 2021-01-20 DIAGNOSIS — Z84.1 FAMILY HISTORY OF DISORDERS OF KIDNEY AND URETER: ICD-10-CM

## 2021-01-20 DIAGNOSIS — Z83.3 FAMILY HISTORY OF DIABETES MELLITUS: ICD-10-CM

## 2021-01-20 PROCEDURE — 99203 OFFICE O/P NEW LOW 30 MIN: CPT | Mod: 95

## 2021-01-20 RX ORDER — UBIDECARENONE/VIT E ACET 100MG-5
1000 CAPSULE ORAL
Refills: 0 | Status: ACTIVE | COMMUNITY

## 2021-01-20 RX ORDER — NORETHINDRONE ACETATE AND ETHINYL ESTRADIOL AND FERROUS FUMARATE 1MG-20(24)
KIT ORAL
Refills: 0 | Status: ACTIVE | COMMUNITY

## 2021-01-20 RX ORDER — PNV NO.95/FERROUS FUM/FOLIC AC 28MG-0.8MG
TABLET ORAL
Refills: 0 | Status: ACTIVE | COMMUNITY

## 2021-01-20 NOTE — PHYSICAL EXAM
[Well Nourished] : well nourished [Well Developed] : well developed [Normal Sclera/Conjunctiva] : normal sclera/conjunctiva [EOMI] : extraocular movements intact [No Accessory Muscle Use] : no accessory muscle use [No Respiratory Distress] : no respiratory distress  [Coordination Grossly Intact] : coordination grossly intact [No Focal Deficits] : no focal deficits [Normal Gait] : normal gait [Normal Affect] : the affect was normal [Normal Insight/Judgement] : insight and judgment were intact [de-identified] : Telehealth Visit. Limited to visual inspection only. Perspiration noted. [de-identified] : Glasses [de-identified] : Normal sentences

## 2021-01-20 NOTE — REVIEW OF SYSTEMS
[Fever] : fever [Fatigue] : fatigue [Chills] : chills [Hot Flashes] : no hot flashes [Night Sweats] : no night sweats [Recent Change In Weight] : ~T no recent weight change [Chest Pain] : no chest pain [Palpitations] : palpitations [Leg Claudication] : no leg claudication [Lower Ext Edema] : no lower extremity edema [Orthopnea] : no orthopnea [Paroxysmal Nocturnal Dyspnea] : no paroxysmal nocturnal dyspnea [Shortness Of Breath] : shortness of breath [Wheezing] : no wheezing [Cough] : no cough [Dyspnea on Exertion] : dyspnea on exertion [Negative] : Heme/Lymph

## 2021-01-20 NOTE — PLAN
[FreeTextEntry1] : Rx sent. Refer to pulmonology. Pt advised to sign up for [facility] portal to review labs and communicate any questions or concerns directly. Yearly physical and return as needed for illness, medication refills, and new or existing complaints.

## 2021-01-20 NOTE — HISTORY OF PRESENT ILLNESS
[Home] : at home, [unfilled] , at the time of the visit. [Medical Office: ([facility])___] : at the medical office located in  [Verbal consent obtained from patient] : the patient, [unfilled] [FreeTextEntry8] : Pt is a 25 year F with PMH PCOS, migraines, and anxiety presents via telehealth for initial exam and COVID-19 diagnosis on Friday. Pt denies CP, n/v/d/c.

## 2021-01-21 ENCOUNTER — APPOINTMENT (OUTPATIENT)
Dept: PULMONOLOGY | Facility: CLINIC | Age: 26
End: 2021-01-21
Payer: COMMERCIAL

## 2021-01-21 PROCEDURE — 99204 OFFICE O/P NEW MOD 45 MIN: CPT | Mod: 95

## 2021-01-21 NOTE — PLAN
[FreeTextEntry1] : 26 yo with likely underlying exercise induced asthma and COVID. She is hypoxemic to 93% with exertion and tachycardic. Recent Ddimer wnl. \par Will arrange home O2. Change from Prednisone to Dex. \par Start Symbicort (would like to avoid Albuterol 2/2 tachycardia)\par Will arrange blood work\par \par Will f/u tomorrow. Call office if any issues prior.

## 2021-01-21 NOTE — HISTORY OF PRESENT ILLNESS
[Home] : at home, [unfilled] , at the time of the visit. [Medical Office: ([facility])___] : at the medical office located in  [Verbal consent obtained from patient] : the patient, [unfilled] [FreeTextEntry1] : \par Ms. 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 \par \par Yesterday, was started on Prednisone 40 mg and feels a bit better with it. \par \par O2 sats 93-97\par HR up to 170s with ambulation. \par \par

## 2021-01-22 ENCOUNTER — APPOINTMENT (OUTPATIENT)
Dept: PULMONOLOGY | Facility: CLINIC | Age: 26
End: 2021-01-22
Payer: COMMERCIAL

## 2021-01-22 PROCEDURE — 99442: CPT

## 2021-01-22 NOTE — HISTORY OF PRESENT ILLNESS
[Home] : at home, [unfilled] , at the time of the visit. [Medical Office: ([facility])___] : at the medical office located in  [Verbal consent obtained from patient] : the patient, [unfilled] [FreeTextEntry1] : \par Ms. 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 \par \par Feeling a lot better this morning. O2 sats 97 shortly after ambulation. HR in 70s. \par No fever since last night. Started coughing last night. Some SOB, no chest tightness. \par \par Was seen in ER 1/16 - CXR clear lung; normal Ddimer. \par

## 2021-01-22 NOTE — PLAN
[FreeTextEntry1] : 24 yo with likely underlying exercise induced asthma and COVID.\par On steroids since 1/20, feeling better. O2 sats improved. \par Can use Albuterol as needed, home O2 to maintain sats >94%\par Was not able to get Symbicort 2/2 cost. \par \par will f/u on Monday, patient will call office with any issues over the weekend.

## 2021-01-25 ENCOUNTER — APPOINTMENT (OUTPATIENT)
Dept: PULMONOLOGY | Facility: CLINIC | Age: 26
End: 2021-01-25
Payer: COMMERCIAL

## 2021-01-25 PROCEDURE — 99213 OFFICE O/P EST LOW 20 MIN: CPT | Mod: 95

## 2021-01-25 NOTE — PLAN
[FreeTextEntry1] : 26 yo with likely underlying exercise induced asthma and COVID.\par On steroids since 1/20, complete 10 day course. feeling better, saturating well.\par Can use Albuterol as needed\par Patient is out of isolation. \par \par Will see in office next week, she will call me sooner if needed. \par

## 2021-01-25 NOTE — HISTORY OF PRESENT ILLNESS
[Home] : at home, [unfilled] , at the time of the visit. [Medical Office: ([facility])___] : at the medical office located in  [Verbal consent obtained from patient] : the patient, [unfilled] [FreeTextEntry1] : \par Ms. 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 \par She continues to feel better, has not been using O2. Saturations 97-99%. HR 60s. \par She has no fevers. Has mild cough and intermittent SOB, uses Albuterol about 2 times/day. \par \par \par Was seen in ER 1/16 - CXR clear lung; normal Ddimer. \par

## 2021-02-03 ENCOUNTER — EMERGENCY (EMERGENCY)
Facility: HOSPITAL | Age: 26
LOS: 1 days | Discharge: HOME | End: 2021-02-03
Attending: EMERGENCY MEDICINE | Admitting: EMERGENCY MEDICINE
Payer: COMMERCIAL

## 2021-02-03 ENCOUNTER — APPOINTMENT (OUTPATIENT)
Dept: PULMONOLOGY | Facility: CLINIC | Age: 26
End: 2021-02-03
Payer: COMMERCIAL

## 2021-02-03 ENCOUNTER — TRANSCRIPTION ENCOUNTER (OUTPATIENT)
Age: 26
End: 2021-02-03

## 2021-02-03 VITALS
TEMPERATURE: 97.8 F | OXYGEN SATURATION: 98 % | RESPIRATION RATE: 18 BRPM | HEIGHT: 64 IN | DIASTOLIC BLOOD PRESSURE: 63 MMHG | WEIGHT: 174.25 LBS | HEART RATE: 113 BPM | SYSTOLIC BLOOD PRESSURE: 104 MMHG | BODY MASS INDEX: 29.75 KG/M2

## 2021-02-03 VITALS
SYSTOLIC BLOOD PRESSURE: 106 MMHG | TEMPERATURE: 98 F | HEIGHT: 63 IN | WEIGHT: 169.98 LBS | OXYGEN SATURATION: 99 % | HEART RATE: 108 BPM | RESPIRATION RATE: 18 BRPM | DIASTOLIC BLOOD PRESSURE: 70 MMHG

## 2021-02-03 DIAGNOSIS — N83.519 TORSION OF OVARY AND OVARIAN PEDICLE, UNSPECIFIED SIDE: Chronic | ICD-10-CM

## 2021-02-03 DIAGNOSIS — Z90.89 ACQUIRED ABSENCE OF OTHER ORGANS: Chronic | ICD-10-CM

## 2021-02-03 LAB
ALBUMIN SERPL ELPH-MCNC: 3.5 G/DL — SIGNIFICANT CHANGE UP (ref 3.3–5)
ALP SERPL-CCNC: 75 U/L — SIGNIFICANT CHANGE UP (ref 40–120)
ALT FLD-CCNC: 107 U/L — HIGH (ref 12–78)
ANION GAP SERPL CALC-SCNC: 4 MMOL/L — LOW (ref 5–17)
AST SERPL-CCNC: 25 U/L — SIGNIFICANT CHANGE UP (ref 15–37)
BASOPHILS # BLD AUTO: 0.02 K/UL — SIGNIFICANT CHANGE UP (ref 0–0.2)
BASOPHILS NFR BLD AUTO: 0.1 % — SIGNIFICANT CHANGE UP (ref 0–2)
BILIRUB SERPL-MCNC: 0.4 MG/DL — SIGNIFICANT CHANGE UP (ref 0.2–1.2)
BUN SERPL-MCNC: 12 MG/DL — SIGNIFICANT CHANGE UP (ref 7–23)
CALCIUM SERPL-MCNC: 8.4 MG/DL — LOW (ref 8.5–10.1)
CHLORIDE SERPL-SCNC: 103 MMOL/L — SIGNIFICANT CHANGE UP (ref 96–108)
CO2 SERPL-SCNC: 31 MMOL/L — SIGNIFICANT CHANGE UP (ref 22–31)
CREAT SERPL-MCNC: 0.77 MG/DL — SIGNIFICANT CHANGE UP (ref 0.5–1.3)
D DIMER BLD IA.RAPID-MCNC: <150 NG/ML DDU — SIGNIFICANT CHANGE UP
EOSINOPHIL # BLD AUTO: 0.15 K/UL — SIGNIFICANT CHANGE UP (ref 0–0.5)
EOSINOPHIL NFR BLD AUTO: 1 % — SIGNIFICANT CHANGE UP (ref 0–6)
GLUCOSE SERPL-MCNC: 78 MG/DL — SIGNIFICANT CHANGE UP (ref 70–99)
HCG SERPL-ACNC: <1 MIU/ML — SIGNIFICANT CHANGE UP
HCT VFR BLD CALC: 43.2 % — SIGNIFICANT CHANGE UP (ref 34.5–45)
HGB BLD-MCNC: 14.1 G/DL — SIGNIFICANT CHANGE UP (ref 11.5–15.5)
IMM GRANULOCYTES NFR BLD AUTO: 1 % — SIGNIFICANT CHANGE UP (ref 0–1.5)
LYMPHOCYTES # BLD AUTO: 23.7 % — SIGNIFICANT CHANGE UP (ref 13–44)
LYMPHOCYTES # BLD AUTO: 3.63 K/UL — HIGH (ref 1–3.3)
MCHC RBC-ENTMCNC: 28.7 PG — SIGNIFICANT CHANGE UP (ref 27–34)
MCHC RBC-ENTMCNC: 32.6 GM/DL — SIGNIFICANT CHANGE UP (ref 32–36)
MCV RBC AUTO: 88 FL — SIGNIFICANT CHANGE UP (ref 80–100)
MONOCYTES # BLD AUTO: 0.93 K/UL — HIGH (ref 0–0.9)
MONOCYTES NFR BLD AUTO: 6.1 % — SIGNIFICANT CHANGE UP (ref 2–14)
NEUTROPHILS # BLD AUTO: 10.41 K/UL — HIGH (ref 1.8–7.4)
NEUTROPHILS NFR BLD AUTO: 68.1 % — SIGNIFICANT CHANGE UP (ref 43–77)
NRBC # BLD: 0 /100 WBCS — SIGNIFICANT CHANGE UP (ref 0–0)
PLATELET # BLD AUTO: 360 K/UL — SIGNIFICANT CHANGE UP (ref 150–400)
POTASSIUM SERPL-MCNC: 4.2 MMOL/L — SIGNIFICANT CHANGE UP (ref 3.5–5.3)
POTASSIUM SERPL-SCNC: 4.2 MMOL/L — SIGNIFICANT CHANGE UP (ref 3.5–5.3)
PROT SERPL-MCNC: 7.1 G/DL — SIGNIFICANT CHANGE UP (ref 6–8.3)
RBC # BLD: 4.91 M/UL — SIGNIFICANT CHANGE UP (ref 3.8–5.2)
RBC # FLD: 13.4 % — SIGNIFICANT CHANGE UP (ref 10.3–14.5)
SODIUM SERPL-SCNC: 138 MMOL/L — SIGNIFICANT CHANGE UP (ref 135–145)
TROPONIN I SERPL-MCNC: <.015 NG/ML — SIGNIFICANT CHANGE UP (ref 0.01–0.04)
WBC # BLD: 15.3 K/UL — HIGH (ref 3.8–10.5)
WBC # FLD AUTO: 15.3 K/UL — HIGH (ref 3.8–10.5)

## 2021-02-03 PROCEDURE — 99072 ADDL SUPL MATRL&STAF TM PHE: CPT

## 2021-02-03 PROCEDURE — 99284 EMERGENCY DEPT VISIT MOD MDM: CPT | Mod: 25

## 2021-02-03 PROCEDURE — 99215 OFFICE O/P EST HI 40 MIN: CPT

## 2021-02-03 PROCEDURE — 96374 THER/PROPH/DIAG INJ IV PUSH: CPT | Mod: XU

## 2021-02-03 PROCEDURE — 99284 EMERGENCY DEPT VISIT MOD MDM: CPT

## 2021-02-03 PROCEDURE — 99285 EMERGENCY DEPT VISIT HI MDM: CPT

## 2021-02-03 PROCEDURE — 84702 CHORIONIC GONADOTROPIN TEST: CPT

## 2021-02-03 PROCEDURE — 71275 CT ANGIOGRAPHY CHEST: CPT

## 2021-02-03 PROCEDURE — 93971 EXTREMITY STUDY: CPT | Mod: 26,RT

## 2021-02-03 PROCEDURE — 82550 ASSAY OF CK (CPK): CPT

## 2021-02-03 PROCEDURE — 36415 COLL VENOUS BLD VENIPUNCTURE: CPT

## 2021-02-03 PROCEDURE — 84484 ASSAY OF TROPONIN QUANT: CPT

## 2021-02-03 PROCEDURE — 93010 ELECTROCARDIOGRAM REPORT: CPT

## 2021-02-03 PROCEDURE — 93005 ELECTROCARDIOGRAM TRACING: CPT

## 2021-02-03 PROCEDURE — 85025 COMPLETE CBC W/AUTO DIFF WBC: CPT

## 2021-02-03 PROCEDURE — 71045 X-RAY EXAM CHEST 1 VIEW: CPT

## 2021-02-03 PROCEDURE — 80053 COMPREHEN METABOLIC PANEL: CPT

## 2021-02-03 PROCEDURE — 71275 CT ANGIOGRAPHY CHEST: CPT | Mod: 26,MB

## 2021-02-03 PROCEDURE — 93971 EXTREMITY STUDY: CPT

## 2021-02-03 PROCEDURE — 71045 X-RAY EXAM CHEST 1 VIEW: CPT | Mod: 26

## 2021-02-03 PROCEDURE — 85379 FIBRIN DEGRADATION QUANT: CPT

## 2021-02-03 RX ORDER — DEXAMETHASONE 0.5 MG/5ML
10 ELIXIR ORAL ONCE
Refills: 0 | Status: COMPLETED | OUTPATIENT
Start: 2021-02-03 | End: 2021-02-03

## 2021-02-03 RX ORDER — SODIUM CHLORIDE 9 MG/ML
1000 INJECTION INTRAMUSCULAR; INTRAVENOUS; SUBCUTANEOUS ONCE
Refills: 0 | Status: COMPLETED | OUTPATIENT
Start: 2021-02-03 | End: 2021-02-03

## 2021-02-03 RX ADMIN — SODIUM CHLORIDE 1000 MILLILITER(S): 9 INJECTION INTRAMUSCULAR; INTRAVENOUS; SUBCUTANEOUS at 14:45

## 2021-02-03 RX ADMIN — Medication 102 MILLIGRAM(S): at 14:45

## 2021-02-03 NOTE — ED PROVIDER NOTE - CHIEF COMPLAINT
Patient in CT.  
Report called to Thu RN. Preparing to transport patient to room 215  
Return from imaging  
The patient is a 25y Female complaining of shortness of breath.

## 2021-02-03 NOTE — ED PROVIDER NOTE - PROGRESS NOTE DETAILS
spoke with Dr Robles, case discussed, call with results of labs, he spoke with patients pulmonary Dr Buchanan spoke with Dr Buchanan, requested ct, doppler of rle call out to Dr Buchanan, call out to Dr Buchanan, awaiting call back patient pulled her iv out, stating she had to leave, refused vitals.    discharge papers given,  call out to Dr Buchanan, pulmonary awaiting call back spoke with Dr Buchanan, informed patient left ED, discharge papers given,  stated she will follow up with patient.

## 2021-02-03 NOTE — PHYSICAL EXAM
[Normal Oropharynx] : normal oropharynx [Normal Appearance] : normal appearance [No Neck Mass] : no neck mass [Normal Rate/Rhythm] : normal rate/rhythm [Normal S1, S2] : normal s1, s2 [No Murmurs] : no murmurs [Rhonchi] : rhonchi [No Abnormalities] : no abnormalities [Benign] : benign [Normal Gait] : normal gait [No Clubbing] : no clubbing [No Cyanosis] : no cyanosis [No Edema] : no edema [Normal Color/ Pigmentation] : normal color/ pigmentation [No Focal Deficits] : no focal deficits [Oriented x3] : oriented x3 [Normal Affect] : normal affect [TextBox_2] : dyspneic and tachypneic with minimal exertion [TextBox_68] : scattered rhonchi, tachypneic with minimal exertion [TextBox_105] : R knee in brace

## 2021-02-03 NOTE — ED PROVIDER NOTE - CLINICAL SUMMARY MEDICAL DECISION MAKING FREE TEXT BOX
diagnosed + covid January 13, since dx had had intermittent moss, chest pain, seen in ED 17 days ago, for similar symptoms, no acute findings,   seen by her pulmonary Dr Buchanan today, sent to ED for evaluation, will obtain cardio pulmonary consult,  labs, imaging, ekg, cxr, give ivf, iv steroid

## 2021-02-03 NOTE — CONSULT NOTE ADULT - ATTENDING COMMENTS
I saw and examined the patient personally. Spoke with above provider regarding this case. I reviewed the above findings completely.  I agree with the above history, physical, and plan which I have edited where appropriate.     tachy is likely from pulm decondition from recent covid infxn. No signs of significant ischemia or volume overload.   Further cardiac workup will depend on clinical course. Patient agrees with the plan and will contact our office to arrange followup.

## 2021-02-03 NOTE — ED ADULT NURSE NOTE - PMH
Anxiety and depression    COVID-19    Glomerulonephritis, streptococcal, acute    Hirsutism    IUD (intrauterine device) in place    Migraine    Ovarian cyst    Ovarian torsion

## 2021-02-03 NOTE — ED ADULT NURSE NOTE - NSIMPLEMENTINTERV_GEN_ALL_ED
Implemented All Universal Safety Interventions:  [place] to call system. Call bell, personal items and telephone within reach. Instruct patient to call for assistance. Room bathroom lighting operational. Non-slip footwear when patient is off stretcher. Physically safe environment: no spills, clutter or unnecessary equipment. Stretcher in lowest position, wheels locked, appropriate side rails in place.

## 2021-02-03 NOTE — ED PROVIDER NOTE - PROVIDER TOKENS
FREE:[LAST:[Dr Buchanan Pulmonary],PHONE:[(   )    -],FAX:[(   )    -],FOLLOWUP:[1-3 Days],ESTABLISHEDPATIENT:[T]],PROVIDER:[TOKEN:[[id number]],FOLLOWUP:[4-6 Days]]

## 2021-02-03 NOTE — CONSULT NOTE ADULT - ASSESSMENT
26 y/o F with PMHX of glomerulonephritis, anxiety and depression and on 01/16/21 +Covid,was referred to ED from Pulmonologists Dr Buchanan. She was seen today for c/o MEDELLIN, chest pain and palpitations. She reports her symptoms were improving and then 3 days ago became worse. In addition 2 days ago she began having pain in her RLE behind her knee. She denies fever, chills, N/V/D. She reports being prescribed an albuterol inhaler without any relief.     Chest pain/Palpitations/MEDELLIN  - EKG SR 90s no acute ischemia or changes  - Telemetry monitoring  - Obtain TTE   -Obtain CXRay  -Obtain CTA chest R/O PE    RLE pain  -Obtain venous doppler right lower extremity R/O DVT    Pearl Willard, MS ANP, AGACNP  Nurse Practitioner- Cardiology   Spectra #[phone]  24 y/o F with PMHX of glomerulonephritis, anxiety and depression and on 01/16/21 +Covid,was referred to ED from Pulmonologists Dr Buchanan. She was seen today for c/o MEDELLIN, chest pain and palpitations. She reports her symptoms were improving and then 3 days ago became worse. In addition 2 days ago she began having pain in her RLE behind her knee. She denies fever, chills, N/V/D. She reports being prescribed an albuterol inhaler without any relief.     Chest pain/Palpitations/MEDELLIN  - EKG SR 90s no acute ischemia or changes  - likely w st that is reactive to pulm deconditioning from recent covid infxn  - outpt holter and echo.   - No signs of significant ischemia or volume overload.       RLE pain  -Obtain venous doppler right lower extremity R/O DVT    Pearl Willard, MS ANP, AGACNP  Nurse Practitioner- Cardiology   Spectra #[phone]

## 2021-02-03 NOTE — ED PROVIDER NOTE - CARE PROVIDER_API CALL
Dr Buchanan Pulmonary,   Phone: (   )    -  Fax: (   )    -  Established Patient  Follow Up Time: 1-3 Days    Nando Rosales)  Internal Medicine  [address] [place] [id number]  Phone: [phone]  Fax: [phone]  Follow Up Time: 4-6 Days

## 2021-02-03 NOTE — ED ADULT NURSE REASSESSMENT NOTE - NS ED NURSE REASSESS COMMENT FT1
Patient removed her IV. Patient dressed standing at the nursing station desk says, "I had my CT and I have to go". Patient told to go back to room for vital signs but refused and states, "I really don't have time, I am going to go". ED NOE Plata at desk and aware. Discharge paperwork and results provided to patient. IV removal confirmed. Patient left department, ambulatory gait.

## 2021-02-03 NOTE — ED PROVIDER NOTE - CARE PROVIDERS DIRECT ADDRESSES
,DirectAddress_Unknown,[email or web address]

## 2021-02-03 NOTE — ED ADULT TRIAGE NOTE - CHIEF COMPLAINT QUOTE
Shortness of breath.  Diagnosed with COvid 2 weeks ago.  Has seen pulmonologist whom recommended CTA to R/O PE.  O2 sat triage 99% Shortness of breath.  Diagnosed with COvid 3.5 weeks ago.  Has seen pulmonologist whom recommended CTA to R/O PE.  O2 sat triage 99%

## 2021-02-03 NOTE — ED ADULT NURSE NOTE - OBJECTIVE STATEMENT
Pt with shortness of breath upon exertion - pt dx with COVID January 17th pt cont to be short of breath with exertion -

## 2021-02-03 NOTE — CONSULT NOTE ADULT - SUBJECTIVE AND OBJECTIVE BOX
[facility] - Giselle Roldan, Stephane Pratt, Denita, Bobby, Rizwana Clark  Office Number: [phone]    Initial Consult Note  CHIEF COMPLAINT: Patient is a 25y old  Female who presents with a chief complaint of   HPI:    Allergies    eggs (Diarrhea)  fish (Hives)  iodine (Hives)  Seafood (Unknown)  shellfish (Anaphylaxis)    Intolerances      PAST MEDICAL & SURGICAL HISTORY:  Migraine    Hirsutism    Anxiety and depression    Glomerulonephritis, streptococcal, acute    IUD (intrauterine device) in place    Ovarian torsion    Ovarian cyst    History of tonsillectomy    Ovarian torsion      MEDICATIONS  (STANDING):  dexAMETHasone  IVPB 10 milliGRAM(s) IV Intermittent Once  sodium chloride 0.9% Bolus 1000 milliLiter(s) IV Bolus once    MEDICATIONS  (PRN):    FAMILY HISTORY:  No pertinent family history in first degree relatives      SOCIAL HISTORY    Marital Status:   Occupation:   Lives with:     SUBSTANCE USE  Tobacco Usage:  ( ) None ( ) never smoked   ( ) former smoker  ( ) current smoker; Packs per day:   Alcohol Usage: ( ) none  ( ) occasional ( ) 2-3 times a week ( ) daily; Last drink:   Recreational drugs ( ) None    REVIEW OF SYSTEMS   CONSTITUTIONAL: No fevers, No chills, No fatigue, No weight gain  EYES: No vision changes, No vertigo, No throat pain   ENT: No congestion, No ear pain, No sore throat.  NECK: No pain, No stiffness  RESPIRATORY: No shortness of breath, No cough, No wheezing, No hemoptysis  CARDIOVASCULAR: No chest pain. No palpitations, No MEDELLIN, No orthopnea, No PND, No pleuritic pain  GASTROINTESTINAL: No abdominal pain, No nausea, No vomiting, No hematemesis, No diarrhea No constipation. No melena  GENITOURINARY: No dysuria, No frequency, No incontinence, No hematuria  NEUROLOGICAL: No dizziness, No lightheadedness, No syncope, No LOC, No headache, No numbness, No weakness  MUSCULOSKELETAL: No joint pain, No joint swelling.  PSYCHIATRIC: No anxiety, No depression  DERMATOLOGY: No diaphoresis. No itching, No rashes, No pressure ulcers  HEME/LYMPH: No easy bruising, or bleeding gums  All other review of systems is negative unless indicated above.    VITAL SIGNS:   Vital Signs Last 24 Hrs  T(C): 36.6 (03 Feb 2021 14:09), Max: 36.6 (03 Feb 2021 14:09)  T(F): 97.9 (03 Feb 2021 14:09), Max: 97.9 (03 Feb 2021 14:09)  HR: 108 (03 Feb 2021 14:09) (108 - 108)  BP: 106/70 (03 Feb 2021 14:09) (106/70 - 106/70)  BP(mean): --  RR: 18 (03 Feb 2021 14:09) (18 - 18)  SpO2: 99% (03 Feb 2021 14:09) (99% - 99%)    Physical Exam:    Appearance: NAD, no distress, alert,   HEENT: Moist Mucous Membranes, Anicteric  Cardiovascular: Regular rate and rhythm, Normal S1 S2, No JVD, No murmurs, No rubs, gallops or clicks  Respiratory: Lungs clear to auscultation. No rales, No rhonchi, No wheezing. No tenderness to palpation  Gastrointestinal:  Soft, Non-tender, + BS  Neurologic: Non-focal  Skin: Warm and dry, No rashes, No ecchymosis, No cyanosis, No ulcers   Musculoskeletal: No clubbing, No cyanosis  Vascular: Peripheral pulses palpable 2+ bilaterally  Psychiatry: Mood & affect appropriate  Lymph: No peripheral edema.     I&O's Summary      LABS: All Labs Reviewed:                               [facility] - Giselle Roldan, Stephane Pratt, Denita, Bobby, Rizwana Clark  Office Number: [phone]    Initial Consult Note  CHIEF COMPLAINT: COVID+, MEDELLIN. palpitations and chest pain.   HPI: 26 y/o F with PMHX of glomerulonephritis, anxiety and depression and on 1 /16/21 +Covid, referred to ED from Pulmonologists Dr Buchanan. She was seen today for c/o MEDELLIN, chest pain and palpitations and was found to have a - 170.  She reports her symptoms were improving and then 3 days ago became worse. In addition 2 days ago she began having pain in her RLE behind her knee. She denies fever, chills, N/V/D. She reports being prescribed an albuterol inhaler without any relief.     Allergies:   eggs (Diarrhea)  fish (Hives)  iodine (Hives)  Seafood (Unknown)  shellfish (Anaphylaxis)      PAST MEDICAL & SURGICAL HISTORY:  Migraine    Hirsutism    Anxiety and depression    Glomerulonephritis, streptococcal, acute    IUD (intrauterine device) in place    Ovarian torsion    Ovarian cyst    History of tonsillectomy    Ovarian torsion      MEDICATIONS  (STANDING):  dexAMETHasone  IVPB 10 milliGRAM(s) IV Intermittent Once  sodium chloride 0.9% Bolus 1000 milliLiter(s) IV Bolus once    FAMILY HISTORY:  No pertinent family history in first degree relatives      SOCIAL HISTORY    Lives with: Parents     SUBSTANCE USE  Tobacco Usage:  ( ) None ( ) never smoked   ( x) former smoker  ( ) current smoker; Packs per day:   Alcohol Usage: ( ) none  ( ) occasional ( ) 2-3 times a week ( ) daily; Last drink:   Recreational drugs ( ) None    REVIEW OF SYSTEMS   CONSTITUTIONAL: No fevers, No chills, No fatigue, No weight gain  EYES: No vision changes, No vertigo, No throat pain   ENT: No congestion, No ear pain, No sore throat.  NECK: No pain, No stiffness  RESPIRATORY: + MEDELLIN, No cough, No wheezing, No hemoptysis  CARDIOVASCULAR: + chest pain. +palpitations,  + MEDELLIN, + pleuritic pain No orthopnea, No PND  GASTROINTESTINAL: No abdominal pain, No nausea, No vomiting, No hematemesis, No diarrhea No constipation. No melena  GENITOURINARY: No dysuria, No frequency, No incontinence, No hematuria  NEUROLOGICAL: No dizziness, No lightheadedness, No syncope, No LOC, No headache, No numbness, No weakness  MUSCULOSKELETAL: + R knee joint pain, No joint swelling.  PSYCHIATRIC: No anxiety, No depression  DERMATOLOGY: No diaphoresis. No itching, No rashes, No pressure ulcers  HEME/LYMPH: No easy bruising, or bleeding gums  All other review of systems is negative unless indicated above.    VITAL SIGNS:   Vital Signs Last 24 Hrs  T(C): 36.6 (03 Feb 2021 14:09), Max: 36.6 (03 Feb 2021 14:09)  T(F): 97.9 (03 Feb 2021 14:09), Max: 97.9 (03 Feb 2021 14:09)  HR: 108 (03 Feb 2021 14:09) (108 - 108)  BP: 106/70 (03 Feb 2021 14:09) (106/70 - 106/70)  BP(mean): --  RR: 18 (03 Feb 2021 14:09) (18 - 18)  SpO2: 99% (03 Feb 2021 14:09) (99% - 99%)    Physical Exam:    Appearance: NAD, no distress, alert,   HEENT: Moist Mucous Membranes, Anicteric  Cardiovascular: Regular rate and rhythm, Normal S1 S2, No JVD, No murmurs, No rubs, gallops or clicks  Respiratory: Lungs clear to auscultation. No rales, No rhonchi, No wheezing. No tenderness to palpation  Gastrointestinal:  Soft, Non-tender, + BS  Neurologic: Non-focal  Skin: Warm and dry, No rashes, No ecchymosis, No cyanosis, No ulcers   Musculoskeletal: No clubbing, No cyanosis  Vascular: Peripheral pulses palpable 2+ bilaterally  Psychiatry: Mood & affect appropriate  Lymph: No peripheral edema.           LABS: Pending  EKG: SR 90 Non-specific T wave inversion III and V1                               [facility] - Giselle Roldan, Stephane Pratt, Denita, Bobby, Rizwana Clark  Office Number: [phone]    Initial Consult Note  CHIEF COMPLAINT: COVID+, MEDELLIN. palpitations and chest pain.   HPI: 24 y/o F with PMHX of glomerulonephritis, anxiety and depression and on 1 /16/21 +Covid, referred to ED from Pulmonologists Dr Buchanan. She was seen today for c/o MEDELLIN, chest pain and palpitations and was found to have a - 170.  She reports her symptoms were improving and then 3 days ago became worse. In addition 2 days ago she began having pain in her RLE behind her knee. She denies fever, chills, N/V/D. She reports being prescribed an albuterol inhaler without any relief.     Allergies:   eggs (Diarrhea)  fish (Hives)  iodine (Hives)  Seafood (Unknown)  shellfish (Anaphylaxis)      PAST MEDICAL & SURGICAL HISTORY:  Migraine    Hirsutism    Anxiety and depression    Glomerulonephritis, streptococcal, acute    IUD (intrauterine device) in place    Ovarian torsion    Ovarian cyst    History of tonsillectomy    Ovarian torsion      MEDICATIONS  (STANDING):  dexAMETHasone  IVPB 10 milliGRAM(s) IV Intermittent Once  sodium chloride 0.9% Bolus 1000 milliLiter(s) IV Bolus once    FAMILY HISTORY:  No pertinent family history in first degree relatives      SOCIAL HISTORY    Lives with: Parents     SUBSTANCE USE  Tobacco Usage:  ( ) None ( ) never smoked   ( x) former smoker  ( ) current smoker; Packs per day:   Alcohol Usage: (x ) none  ( ) occasional ( ) 2-3 times a week ( ) daily; Last drink:   Recreational drugs ( x) None    REVIEW OF SYSTEMS   CONSTITUTIONAL: No fevers, No chills, No fatigue, No weight gain  EYES: No vision changes, No vertigo, No throat pain   ENT: No congestion, No ear pain, No sore throat.  NECK: No pain, No stiffness  RESPIRATORY: + MEDELLIN, No cough, No wheezing, No hemoptysis  CARDIOVASCULAR: + chest pain. +palpitations,  + MEDELLIN, + pleuritic pain No orthopnea, No PND  GASTROINTESTINAL: No abdominal pain, No nausea, No vomiting, No hematemesis, No diarrhea No constipation. No melena  GENITOURINARY: No dysuria, No frequency, No incontinence, No hematuria  NEUROLOGICAL: No dizziness, No lightheadedness, No syncope, No LOC, No headache, No numbness, No weakness  MUSCULOSKELETAL: + R knee joint pain, No joint swelling.  PSYCHIATRIC: No anxiety, No depression  DERMATOLOGY: No diaphoresis. No itching, No rashes, No pressure ulcers  HEME/LYMPH: No easy bruising, or bleeding gums  All other review of systems is negative unless indicated above.    VITAL SIGNS:   Vital Signs Last 24 Hrs  T(C): 36.6 (03 Feb 2021 14:09), Max: 36.6 (03 Feb 2021 14:09)  T(F): 97.9 (03 Feb 2021 14:09), Max: 97.9 (03 Feb 2021 14:09)  HR: 108 (03 Feb 2021 14:09) (108 - 108)  BP: 106/70 (03 Feb 2021 14:09) (106/70 - 106/70)  BP(mean): --  RR: 18 (03 Feb 2021 14:09) (18 - 18)  SpO2: 99% (03 Feb 2021 14:09) (99% - 99%)    Physical Exam:    Appearance: NAD, no distress, alert,   HEENT: Moist Mucous Membranes, Anicteric  Cardiovascular: Regular rate and rhythm, Normal S1 S2, No JVD, No murmurs, No rubs, gallops or clicks  Respiratory: Lungs clear to auscultation. No rales, No rhonchi, No wheezing. No tenderness to palpation  Gastrointestinal:  Soft, Non-tender, + BS  Neurologic: Non-focal  Skin: Warm and dry, No rashes, No ecchymosis, No cyanosis, No ulcers   Musculoskeletal: No clubbing, No cyanosis  Vascular: Peripheral pulses palpable 2+ bilaterally  Psychiatry: Mood & affect appropriate  Lymph: No peripheral edema.           LABS: Pending  EKG: SR 90 Non-specific T wave inversion III and V1

## 2021-02-03 NOTE — ED PROVIDER NOTE - OBJECTIVE STATEMENT
25 y female presents with states she was diagnosed January 13 + covid 19, states since then she has had intermittent chest pain, moss, was seen in ED 2 weeks ago, today she saw her pulmonary doctor, cannot recall name,  the office tried to obtain outpatient ct and echo, but was not able to scheduled, so advised come to ED for testing.  patient denies cough, fever, calf pain.  states she has had occasional joint pains, is presently wearing a right knee brace for right knee pain.  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    occasionally vapes.  social etoh.  pmh   anxiety depression.

## 2021-02-03 NOTE — ASSESSMENT
[FreeTextEntry1] : 24 yo with COVID infection since 1/13 here for f/u. \par She felt better with Dexamethasone but now reports worsening SOB, tachypneic and tachycardic with minimal exertion. Given pain behind knee, hx of covid, tachycardia, I am quite concerned about possibility of PE. \par Patient will go to ER for blood work, CTA, cardiology evaluation. \par \par I will f/u with her after.

## 2021-02-03 NOTE — ED PROVIDER NOTE - ATTENDING CONTRIBUTION TO CARE
26 yo F p/w sp COVID infxn - initial sx 1/19. Pt with some dyspnea which had improved. Now with some MEDELLIN over past ~ 4 - 5 days. No fever/chills. no new illness. no chest  pain. no abd pain. no v/d. No recent trauma. no recent illness. no neck/ back pain. Min cough. no agg/allev factors. No other inj or co.  Pt was being set up for otpt CTA and Echo but came to ed as was having diff setting up as outpt  exam: MM Moist.  neck supple. no meningeal signs. abd soft NT. no hsm. no cvat. nl resp effort. no acc muscle use. no c/c/e. non-toxic, well appearing. no other acute findings.  Check labs, CTA, cardio eval, otpt fu

## 2021-02-05 PROBLEM — U07.1 COVID-19: Chronic | Status: ACTIVE | Noted: 2021-02-03

## 2021-02-07 ENCOUNTER — TRANSCRIPTION ENCOUNTER (OUTPATIENT)
Age: 26
End: 2021-02-07

## 2021-02-08 ENCOUNTER — TRANSCRIPTION ENCOUNTER (OUTPATIENT)
Age: 26
End: 2021-02-08

## 2021-02-09 ENCOUNTER — TRANSCRIPTION ENCOUNTER (OUTPATIENT)
Age: 26
End: 2021-02-09

## 2021-02-09 RX ORDER — PREDNISONE 20 MG/1
20 TABLET ORAL DAILY
Qty: 8 | Refills: 0 | Status: DISCONTINUED | COMMUNITY
Start: 2021-01-20 | End: 2021-02-09

## 2021-02-11 NOTE — ED ADULT NURSE NOTE - PATIENT DISCHARGE SIGNATURE
Patients GI provider:  Dr Deion Salazar    Number to return call: [phone]     Reason for call: Pt calling stating her pcp wrote a script for an antibiotic and steroids for her back and wanted to know if it is ok for her to take them?      Scheduled procedure/appointment date if applicable: NA 25-Apr-2018

## 2021-02-12 ENCOUNTER — APPOINTMENT (OUTPATIENT)
Dept: CARDIOLOGY | Facility: CLINIC | Age: 26
End: 2021-02-12
Payer: COMMERCIAL

## 2021-02-12 PROCEDURE — 99072 ADDL SUPL MATRL&STAF TM PHE: CPT

## 2021-02-12 PROCEDURE — 93306 TTE W/DOPPLER COMPLETE: CPT

## 2021-02-16 ENCOUNTER — APPOINTMENT (OUTPATIENT)
Dept: PULMONOLOGY | Facility: CLINIC | Age: 26
End: 2021-02-16
Payer: COMMERCIAL

## 2021-02-16 PROCEDURE — 99213 OFFICE O/P EST LOW 20 MIN: CPT | Mod: 95

## 2021-02-16 NOTE — ASSESSMENT
[FreeTextEntry1] : 26 yo with COVID infection since 1/13 here for f/u. \par She is doing better today but still has persistent shortness of breath and cough with prolonged exertion. Unfortunately, she was not able to obtain simple cord due to cost and is only using albuterol.\par -- Will send in Budesonide - Farmeterol or another ICS containing inhaler depending on coverage\par -- will need repeat CT chest in 3-4 month\par -- will need PFTs\par -- able to return to work with light duty. \par \par All questions answered. Patient in agreement with plan. \par Follow up next week  or sooner if needed.\par

## 2021-02-16 NOTE — PHYSICAL EXAM
[No Abnormalities] : no abnormalities [Benign] : benign [Normal Color/ Pigmentation] : normal color/ pigmentation [No Focal Deficits] : no focal deficits [Oriented x3] : oriented x3 [Normal Affect] : normal affect [No Acute Distress] : no acute distress

## 2021-02-16 NOTE — HISTORY OF PRESENT ILLNESS
[Home] : at home, [unfilled] , at the time of the visit. [Medical Office: ([facility])___] : at the medical office located in  [Verbal consent obtained from patient] : the patient, [unfilled] [TextBox_4] : \par Ms. 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 \par Was sent to ER on 2/3 with tachycardia, worsening shortness of breath.\par \par CTA chest was done, showed mild peripheral right lower lobe opacities, no evidence of PE. Lower extremity Dopplers were negative. d-dimer was normal. Patient was discharged with cardiology followup.\par \par Today, reports doing better. She is able to walk a bit more but still not back to her baseline. She went [place] walk [place] the beach [place], fell short of breath after walking for about 20 minutes. She was not able to get Symbicort inhaler secondary to a cost and is currently only using albuterol. Reports using albuterol " a lot".\par

## 2021-02-22 ENCOUNTER — TRANSCRIPTION ENCOUNTER (OUTPATIENT)
Age: 26
End: 2021-02-22

## 2021-02-24 ENCOUNTER — APPOINTMENT (OUTPATIENT)
Dept: PULMONOLOGY | Facility: CLINIC | Age: 26
End: 2021-02-24
Payer: COMMERCIAL

## 2021-02-24 ENCOUNTER — RESULT REVIEW (OUTPATIENT)
Age: 26
End: 2021-02-24

## 2021-02-24 PROCEDURE — 99214 OFFICE O/P EST MOD 30 MIN: CPT | Mod: 95

## 2021-02-24 RX ORDER — DEXAMETHASONE 2 MG/1
2 TABLET ORAL
Qty: 30 | Refills: 0 | Status: COMPLETED | COMMUNITY
Start: 2021-01-21 | End: 2021-02-24

## 2021-02-24 RX ORDER — BUDESONIDE AND FORMOTEROL FUMARATE DIHYDRATE 80; 4.5 UG/1; UG/1
80-4.5 AEROSOL RESPIRATORY (INHALATION) TWICE DAILY
Qty: 1 | Refills: 3 | Status: DISCONTINUED | COMMUNITY
Start: 2021-02-16 | End: 2021-02-24

## 2021-02-24 RX ORDER — MOMETASONE FUROATE 100 UG/1
100 AEROSOL RESPIRATORY (INHALATION) TWICE DAILY
Qty: 1 | Refills: 2 | Status: DISCONTINUED | COMMUNITY
Start: 2021-02-17 | End: 2021-02-24

## 2021-02-24 RX ORDER — BUDESONIDE AND FORMOTEROL FUMARATE DIHYDRATE 80; 4.5 UG/1; UG/1
80-4.5 AEROSOL RESPIRATORY (INHALATION)
Qty: 1 | Refills: 3 | Status: DISCONTINUED | COMMUNITY
Start: 2021-01-21 | End: 2021-02-24

## 2021-02-24 NOTE — PHYSICAL EXAM
[No Acute Distress] : no acute distress [No Abnormalities] : no abnormalities [Benign] : benign [Normal Color/ Pigmentation] : normal color/ pigmentation [No Focal Deficits] : no focal deficits [Oriented x3] : oriented x3 [Normal Affect] : normal affect

## 2021-02-24 NOTE — ASSESSMENT
[FreeTextEntry1] : 24 yo with COVID infection since 1/13 here for f/u. \par She is doing better today but still has persistent shortness of breath and cough with prolonged exertion. Unfortunately, still was not able to obtain simple cord due to cost and is only using albuterol.\par -- will call pharmacy\par -- will need repeat CT chest in 3-4 month\par -- will need PFTs\par -- able to return to work with light duty. \par \par All questions answered. Patient in agreement with plan. \par Follow up in a month or sooner if needed.\par

## 2021-02-24 NOTE — HISTORY OF PRESENT ILLNESS
[Home] : at home, [unfilled] , at the time of the visit. [Medical Office: ([facility])___] : at the medical office located in  [Verbal consent obtained from patient] : the patient, [unfilled] [TextBox_4] : \par Ms. 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 \par Was sent to ER on 2/3 with tachycardia, worsening shortness of breath.\par CTA chest was done, showed mild peripheral right lower lobe opacities, no evidence of PE. Lower extremity Dopplers were negative. d-dimer was normal. Patient was discharged with cardiology followup. Was seen by Dr Rosales. Had normal TTE\par \par Today, continues to feel better, able to walk around, SOB is impving. Unfortunately, still did not get her ICS inhaler (the ones i sent were not covered). Continues to use Albuterol frequently. \par

## 2021-03-15 ENCOUNTER — APPOINTMENT (OUTPATIENT)
Dept: PULMONOLOGY | Facility: CLINIC | Age: 26
End: 2021-03-15

## 2021-05-10 NOTE — ED PROVIDER NOTE - DATE/TIME 1
Final Anesthesia Post-op Assessment    Patient: Adele King  Procedure(s) Performed: RIGHT EXTRACTION, CATARACT, WITH IOL INSERTION - RIGHT  Anesthesia type: MAC    Vitals Value Taken Time   Temp 35.8 °C (96.5 °F) 05/10/21 0819   Pulse 52 05/10/21 0819   Resp 16 05/10/21 0819   SpO2 98 % 05/10/21 0819   /80 05/10/21 0819         Patient Location: bedside  Post-op Vital Signs:stable  Level of Consciousness: participates in exam  Respiratory Status: spontaneous ventilation and unassisted  Cardiovascular blood pressure returned to baseline  Hydration: euvolemic  Pain Management: well controlled  Handoff: Handoff to receiving clinician was performed and questions were answered  Vomiting: none   Nausea: None  Airway Patency:patent  Post-op Assessment: awake, alert, appropriately conversant, or baseline, no complications and patient tolerated procedure well with no complications      No complications documented.    16-Jan-2021 21:39

## 2021-06-18 ENCOUNTER — APPOINTMENT (OUTPATIENT)
Dept: INTERNAL MEDICINE | Facility: CLINIC | Age: 26
End: 2021-06-18
Payer: COMMERCIAL

## 2021-06-18 VITALS
WEIGHT: 178 LBS | SYSTOLIC BLOOD PRESSURE: 106 MMHG | OXYGEN SATURATION: 96 % | HEIGHT: 64.37 IN | DIASTOLIC BLOOD PRESSURE: 72 MMHG | HEART RATE: 85 BPM | TEMPERATURE: 96.1 F | BODY MASS INDEX: 30.39 KG/M2

## 2021-06-18 PROCEDURE — 99214 OFFICE O/P EST MOD 30 MIN: CPT

## 2021-06-18 PROCEDURE — 99072 ADDL SUPL MATRL&STAF TM PHE: CPT

## 2021-06-18 RX ORDER — LOPERAMIDE HYDROCHLORIDE 2 MG/1
2 CAPSULE ORAL
Qty: 56 | Refills: 0 | Status: ACTIVE | COMMUNITY
Start: 2021-06-18 | End: 1900-01-01

## 2021-06-18 NOTE — PLAN
[FreeTextEntry1] : Rx sent. f/u with GI for IBS and GYN for PCOS. Pt advised to sign up for [facility] portal to review labs and communicate any questions or concerns directly. Yearly physical and return as needed for illness, medication refills, and new or existing complaints.

## 2021-06-18 NOTE — PHYSICAL EXAM
[No Acute Distress] : no acute distress [Well Nourished] : well nourished [Well Developed] : well developed [Well-Appearing] : well-appearing [No Respiratory Distress] : no respiratory distress  [No Accessory Muscle Use] : no accessory muscle use [Soft] : abdomen soft [Non-distended] : non-distended [Normal Bowel Sounds] : normal bowel sounds [No Joint Swelling] : no joint swelling [Grossly Normal Strength/Tone] : grossly normal strength/tone [Coordination Grossly Intact] : coordination grossly intact [No Focal Deficits] : no focal deficits [Normal Gait] : normal gait [de-identified] : mild TTP LLQ, no rebound

## 2021-06-18 NOTE — HISTORY OF PRESENT ILLNESS
[FreeTextEntry8] : Pt is a 25 year F with PMH PCOS, migraines, and anxiety presenting for IBS symptoms. Pt denies CP/SOB, fever/chills.

## 2021-07-01 ENCOUNTER — APPOINTMENT (OUTPATIENT)
Dept: INTERNAL MEDICINE | Facility: CLINIC | Age: 26
End: 2021-07-01
Payer: COMMERCIAL

## 2021-07-01 VITALS
HEART RATE: 96 BPM | SYSTOLIC BLOOD PRESSURE: 115 MMHG | TEMPERATURE: 98 F | OXYGEN SATURATION: 99 % | RESPIRATION RATE: 16 BRPM | BODY MASS INDEX: 29.92 KG/M2 | DIASTOLIC BLOOD PRESSURE: 84 MMHG | HEIGHT: 63.5 IN | WEIGHT: 171 LBS

## 2021-07-01 DIAGNOSIS — Z87.42 PERSONAL HISTORY OF OTHER DISEASES OF THE FEMALE GENITAL TRACT: ICD-10-CM

## 2021-07-01 DIAGNOSIS — U07.1 COVID-19: ICD-10-CM

## 2021-07-01 DIAGNOSIS — Z86.59 PERSONAL HISTORY OF OTHER MENTAL AND BEHAVIORAL DISORDERS: ICD-10-CM

## 2021-07-01 PROCEDURE — 99072 ADDL SUPL MATRL&STAF TM PHE: CPT

## 2021-07-01 PROCEDURE — 99395 PREV VISIT EST AGE 18-39: CPT | Mod: 25

## 2021-07-01 PROCEDURE — 36415 COLL VENOUS BLD VENIPUNCTURE: CPT

## 2021-07-01 RX ORDER — FLUTICASONE PROPIONATE 110 UG/1
110 AEROSOL, METERED RESPIRATORY (INHALATION) TWICE DAILY
Qty: 3 | Refills: 3 | Status: DISCONTINUED | COMMUNITY
Start: 2021-02-24 | End: 2021-07-01

## 2021-07-01 NOTE — HISTORY OF PRESENT ILLNESS
[de-identified] : 26 year F with PMH COVID-19 and exercise induced asthma presents for CPE. Pt denies CP/SOB, fever/chills, n/v/d/c.

## 2021-07-01 NOTE — PLAN
[FreeTextEntry1] : Routine blood work and urine today. Pt advised to sign up for [facility] portal to review labs and communicate any questions or concerns directly. Yearly physical and return as needed for illness, medication refills, and new or existing complaints.

## 2021-07-01 NOTE — HEALTH RISK ASSESSMENT
[Employed] : employed [Good] : ~his/her~  mood as  good [Yes] : Yes [Monthly or less (1 pt)] : Monthly or less (1 point) [1 or 2 (0 pts)] : 1 or 2 (0 points) [Never (0 pts)] : Never (0 points) [No] : In the past 12 months have you used drugs other than those required for medical reasons? No [1] : 1) Little interest or pleasure doing things for several days (1) [0] : 2) Feeling down, depressed, or hopeless: Not at all (0) [PHQ-2 Negative - No further assessment needed] : PHQ-2 Negative - No further assessment needed [None] : None [With Family] : lives with family [] : No [Audit-CScore] : 1 [[id number]] : 1 [FreeTextEntry2] : FARIHA @ [facility]

## 2021-07-02 ENCOUNTER — TRANSCRIPTION ENCOUNTER (OUTPATIENT)
Age: 26
End: 2021-07-02

## 2021-07-02 LAB
ALBUMIN SERPL ELPH-MCNC: 4.6 G/DL
ALP BLD-CCNC: 92 U/L
ALT SERPL-CCNC: 13 U/L
ANION GAP SERPL CALC-SCNC: 15 MMOL/L
APPEARANCE: ABNORMAL
AST SERPL-CCNC: 11 U/L
BACTERIA: NEGATIVE
BASOPHILS # BLD AUTO: 0.04 K/UL
BASOPHILS NFR BLD AUTO: 0.5 %
BILIRUB SERPL-MCNC: 0.2 MG/DL
BILIRUBIN URINE: NEGATIVE
BLOOD URINE: NEGATIVE
BUN SERPL-MCNC: 11 MG/DL
CALCIUM SERPL-MCNC: 9.8 MG/DL
CHLORIDE SERPL-SCNC: 106 MMOL/L
CHOLEST SERPL-MCNC: 148 MG/DL
CO2 SERPL-SCNC: 20 MMOL/L
COLOR: YELLOW
CREAT SERPL-MCNC: 0.73 MG/DL
EOSINOPHIL # BLD AUTO: 0.21 K/UL
EOSINOPHIL NFR BLD AUTO: 2.4 %
ESTIMATED AVERAGE GLUCOSE: 117 MG/DL
GLUCOSE QUALITATIVE U: NEGATIVE
GLUCOSE SERPL-MCNC: 94 MG/DL
HBA1C MFR BLD HPLC: 5.7 %
HCT VFR BLD CALC: 42.1 %
HDLC SERPL-MCNC: 56 MG/DL
HGB BLD-MCNC: 13.4 G/DL
HIV1+2 AB SPEC QL IA.RAPID: NONREACTIVE
HYALINE CASTS: 2 /LPF
IMM GRANULOCYTES NFR BLD AUTO: 0.3 %
KETONES URINE: NEGATIVE
LDLC SERPL CALC-MCNC: 68 MG/DL
LEUKOCYTE ESTERASE URINE: NEGATIVE
LYMPHOCYTES # BLD AUTO: 2.55 K/UL
LYMPHOCYTES NFR BLD AUTO: 28.7 %
MAN DIFF?: NORMAL
MCHC RBC-ENTMCNC: 28.7 PG
MCHC RBC-ENTMCNC: 31.8 GM/DL
MCV RBC AUTO: 90.1 FL
MICROSCOPIC-UA: NORMAL
MONOCYTES # BLD AUTO: 0.47 K/UL
MONOCYTES NFR BLD AUTO: 5.3 %
NEUTROPHILS # BLD AUTO: 5.57 K/UL
NEUTROPHILS NFR BLD AUTO: 62.8 %
NITRITE URINE: NEGATIVE
NONHDLC SERPL-MCNC: 92 MG/DL
PH URINE: 5.5
PLATELET # BLD AUTO: 391 K/UL
POTASSIUM SERPL-SCNC: 4.3 MMOL/L
PROT SERPL-MCNC: 6.9 G/DL
PROTEIN URINE: NORMAL
RBC # BLD: 4.67 M/UL
RBC # FLD: 13.1 %
RED BLOOD CELLS URINE: 3 /HPF
SODIUM SERPL-SCNC: 141 MMOL/L
SPECIFIC GRAVITY URINE: 1.03
SQUAMOUS EPITHELIAL CELLS: 1 /HPF
TRIGL SERPL-MCNC: 122 MG/DL
TSH SERPL-ACNC: 1.14 UIU/ML
UROBILINOGEN URINE: NORMAL
WBC # FLD AUTO: 8.87 K/UL
WHITE BLOOD CELLS URINE: 1 /HPF

## 2021-07-06 ENCOUNTER — TRANSCRIPTION ENCOUNTER (OUTPATIENT)
Age: 26
End: 2021-07-06

## 2021-07-22 ENCOUNTER — TRANSCRIPTION ENCOUNTER (OUTPATIENT)
Age: 26
End: 2021-07-22

## 2021-09-03 ENCOUNTER — APPOINTMENT (OUTPATIENT)
Dept: ENDOCRINOLOGY | Facility: CLINIC | Age: 26
End: 2021-09-03

## 2021-09-08 ENCOUNTER — TRANSCRIPTION ENCOUNTER (OUTPATIENT)
Age: 26
End: 2021-09-08

## 2021-09-14 ENCOUNTER — APPOINTMENT (OUTPATIENT)
Dept: ENDOCRINOLOGY | Facility: CLINIC | Age: 26
End: 2021-09-14
Payer: COMMERCIAL

## 2021-09-14 VITALS
SYSTOLIC BLOOD PRESSURE: 117 MMHG | HEART RATE: 83 BPM | DIASTOLIC BLOOD PRESSURE: 85 MMHG | OXYGEN SATURATION: 100 % | WEIGHT: 168 LBS | HEIGHT: 63 IN | BODY MASS INDEX: 29.77 KG/M2

## 2021-09-14 PROCEDURE — 99204 OFFICE O/P NEW MOD 45 MIN: CPT | Mod: 25

## 2021-09-14 PROCEDURE — 36415 COLL VENOUS BLD VENIPUNCTURE: CPT

## 2021-09-15 NOTE — CONSULT LETTER
[Dear  ___] : Dear  [unfilled], [Consult Letter:] : I had the pleasure of evaluating your patient, [unfilled]. [Please see my note below.] : Please see my note below. [Consult Closing:] : Thank you very much for allowing me to participate in the care of this patient.  If you have any questions, please do not hesitate to contact me. [Sincerely,] : Sincerely, [FreeTextEntry3] : Lisa Rascon MS. DO.\par Endocrinology, Diabetes and Metabolism\par [address] [place] [address]\[place] [address]\par Tel [phone]\par Fax [phone]\par

## 2021-09-15 NOTE — ASSESSMENT
[FreeTextEntry1] : 26 year old female with a past medical history of ovarian cysts, PCOS, IBS presents for evaluation for elevated Prolactin\par \par 1. Hyperprolactinemia\par MRI neg for pituitary pathology\par Possibly from SSRI vs false elevation from Macroprolactin\par Will check levels today \par Blood will be drawn in office\par As long as menses are normal, no problems with fertility expected

## 2021-09-15 NOTE — HISTORY OF PRESENT ILLNESS
[FreeTextEntry1] : Ms. MIKY ESTRADA is a 26 year old female with a past medical history of ovarian cysts, PCOS, IBS presents for evaluation for elevated Prolactin. Patient did not have her menses for 3 months (Jan-Apr) and had blood work done. She had blood tests done which found that she had elevated Prolactin. Her levels were on 4/27 (64.5) and 6/30 (88.8). Patient was on OCP when it stopped. Since then she has not taken it. LMP 9/14/21. Patient does take Zoloft but had stopped it for last few months. She denies any regular Marijuana use. No history of antipsychotics, galactorrhea, headaches or vision problems. Patient had a MRI with contrast that showed no mass.

## 2021-09-16 ENCOUNTER — TRANSCRIPTION ENCOUNTER (OUTPATIENT)
Age: 26
End: 2021-09-16

## 2021-09-29 LAB
ESTRADIOL SERPL-MCNC: 48 PG/ML
FSH SERPL-MCNC: 5 IU/L
LH SERPL-ACNC: 2.8 IU/L
MONOMERIC PROLACTIN (ICMA)*: 7.78 NG/ML
PERCENT MACROPROLACTIN: 87 %
PROGEST SERPL-MCNC: 0.1 NG/ML
PROLACTIN SERPL-MCNC: 58.7 NG/ML
PROLACTIN, SERUM (ICMA)*: 59.4 NG/ML

## 2021-09-30 ENCOUNTER — NON-APPOINTMENT (OUTPATIENT)
Age: 26
End: 2021-09-30

## 2021-12-21 ENCOUNTER — RX RENEWAL (OUTPATIENT)
Age: 26
End: 2021-12-21

## 2021-12-21 RX ORDER — FLUTICASONE PROPIONATE AND SALMETEROL 250; 50 UG/1; UG/1
250-50 POWDER RESPIRATORY (INHALATION) TWICE DAILY
Qty: 3 | Refills: 0 | Status: ACTIVE | COMMUNITY
Start: 2021-07-21 | End: 1900-01-01

## 2021-12-24 ENCOUNTER — TRANSCRIPTION ENCOUNTER (OUTPATIENT)
Age: 26
End: 2021-12-24

## 2021-12-24 DIAGNOSIS — J20.9 ACUTE BRONCHITIS, UNSPECIFIED: ICD-10-CM

## 2021-12-29 ENCOUNTER — TRANSCRIPTION ENCOUNTER (OUTPATIENT)
Age: 26
End: 2021-12-29

## 2022-01-21 NOTE — ED ADULT NURSE NOTE - INTEGUMENTARY WDL
Previous Accession (Optional): [id number] Previous Accession (Optional): [id number] Color consistent with ethnicity/race, warm, dry intact, resilient.

## 2022-03-22 ENCOUNTER — APPOINTMENT (OUTPATIENT)
Dept: ENDOCRINOLOGY | Facility: CLINIC | Age: 27
End: 2022-03-22
Payer: COMMERCIAL

## 2022-03-22 ENCOUNTER — APPOINTMENT (OUTPATIENT)
Dept: INTERNAL MEDICINE | Facility: CLINIC | Age: 27
End: 2022-03-22
Payer: COMMERCIAL

## 2022-03-22 PROCEDURE — 99441: CPT

## 2022-03-22 PROCEDURE — 99214 OFFICE O/P EST MOD 30 MIN: CPT | Mod: 95

## 2022-03-22 RX ORDER — DIPHENOXYLATE HYDROCHLORIDE AND ATROPINE SULFATE 2.5; .025 MG/1; MG/1
2.5-0.025 TABLET ORAL 4 TIMES DAILY
Qty: 28 | Refills: 0 | Status: ACTIVE | COMMUNITY
Start: 2022-03-22 | End: 1900-01-01

## 2022-03-22 NOTE — PHYSICAL EXAM
[No Acute Distress] : no acute distress [No Respiratory Distress] : no respiratory distress  [Normal Affect] : the affect was normal [Normal Insight/Judgement] : insight and judgment were intact [de-identified] : Audio only.

## 2022-03-22 NOTE — HISTORY OF PRESENT ILLNESS
[Home] : at home, [unfilled] , at the time of the visit. [Medical Office: ([facility])___] : at the medical office located in  [Verbal consent obtained from patient] : the patient, [unfilled] [FreeTextEntry1] : Ms. MIKY ESTRADA is a 26 year old female with a past medical history of ovarian cysts, PCOS, IBS presents for follow up. Patient started Ozempic in September and lost over 30 lbs. Her current weight is 152 lbs. She stopped it for 1 month now.\par \par Initial Hx: Patient did not have her menses for 3 months (Jan-Apr) and had blood work done. She had blood tests done which found that she had elevated Prolactin. Her levels were on 4/27 (64.5) and 6/30 (88.8). Patient was on OCP when it stopped. Since then she has not taken it. LMP 9/14/21. Patient does take Zoloft but had stopped it for last few months. She denies any regular Marijuana use. No history of antipsychotics, galactorrhea, headaches or vision problems. Patient had a MRI with contrast that showed no mass.

## 2022-03-22 NOTE — PLAN
[FreeTextEntry1] : Rx sent. I-STOP checked. Refer to GI. Pt advised to sign up for [facility] portal to review labs and communicate any questions or concerns directly. Yearly physical and return as needed for illness, medication refills, and new or existing complaints.

## 2022-03-22 NOTE — HISTORY OF PRESENT ILLNESS
[Home] : at home, [unfilled] , at the time of the visit. [Medical Office: ([facility])___] : at the medical office located in  [Verbal consent obtained from patient] : the patient, [unfilled] [FreeTextEntry8] : 26 year F with PMH pre-DM, exercise induced asthma, and IBS presents via TTM for IBS worsening symptom. Pt denies CP/SOB, fever/chills.

## 2022-03-22 NOTE — ASSESSMENT
[FreeTextEntry1] : 26 year old female with a past medical history of ovarian cysts, PCOS, IBS presents for evaluation for elevated Prolactin\par \par 1. Hyperprolactinemia\par MRI neg for pituitary pathology\par Likely due to macroprolactin\par Do not suspect prolactin pathology\par \par 2. Prediabetes\par Tolerated Ozempic\par Recommended to restart\par

## 2022-03-23 ENCOUNTER — APPOINTMENT (OUTPATIENT)
Dept: ENDOCRINOLOGY | Facility: CLINIC | Age: 27
End: 2022-03-23

## 2022-04-05 NOTE — ED ADULT NURSE NOTE - ILLNESS RECENT EXPOSURE
Tolerated injection well. Reviewed therapy plan, offered education material and/or discharge material, reviewed medication information and signs and symptoms  and educated on possible side effects, verbalizes good knowledge of current plan patient verbalizes understanding, and has no signs or symptoms to report at this time. Patient discharged. Patient alert and oriented x3. No distress noted. Vital signs stable. Patient denies any new or worsening pain. Patient denies any needs. All questions answered. Next appointment scheduled.
None known

## 2022-04-20 NOTE — ED ADULT NURSE NOTE - ISOLATION AIRBORNE CONTACT OTHER
Previously in November prior to CT he had Valium 5 mg to be taken 30 minutes prior to imaging.  I do not see 10 mg dose previously prescribed.  Set up for PCP consideration.    Once signed, please send back to care team to fax to his RN.    r/o COVID

## 2022-04-22 ENCOUNTER — APPOINTMENT (OUTPATIENT)
Dept: GASTROENTEROLOGY | Facility: CLINIC | Age: 27
End: 2022-04-22
Payer: COMMERCIAL

## 2022-04-22 ENCOUNTER — OUTPATIENT (OUTPATIENT)
Dept: OUTPATIENT SERVICES | Facility: HOSPITAL | Age: 27
LOS: 1 days | End: 2022-04-22

## 2022-04-22 ENCOUNTER — APPOINTMENT (OUTPATIENT)
Dept: INTERNAL MEDICINE | Facility: CLINIC | Age: 27
End: 2022-04-22

## 2022-04-22 ENCOUNTER — NON-APPOINTMENT (OUTPATIENT)
Age: 27
End: 2022-04-22

## 2022-04-22 VITALS
OXYGEN SATURATION: 98 % | RESPIRATION RATE: 16 BRPM | WEIGHT: 152 LBS | BODY MASS INDEX: 26.93 KG/M2 | HEIGHT: 63 IN | SYSTOLIC BLOOD PRESSURE: 110 MMHG | DIASTOLIC BLOOD PRESSURE: 70 MMHG | HEART RATE: 80 BPM

## 2022-04-22 DIAGNOSIS — Z90.89 ACQUIRED ABSENCE OF OTHER ORGANS: Chronic | ICD-10-CM

## 2022-04-22 DIAGNOSIS — N83.519 TORSION OF OVARY AND OVARIAN PEDICLE, UNSPECIFIED SIDE: Chronic | ICD-10-CM

## 2022-04-22 PROCEDURE — 99204 OFFICE O/P NEW MOD 45 MIN: CPT

## 2022-04-22 NOTE — ASSESSMENT
[FreeTextEntry1] : MIKY ESTRADA 27 year F with PCOS, Pre diabetes and obesity here to establish care for IBS-M for  3 years.\par \par 1. IBS-M\par - Patient's nocturnal symptoms indicate possible other pathology other than IBS.\par - I will check a fecal calprotectin, elastase and CRP  with TTG.\par - Based on these results will decide on endoscopy and colonoscopy for biopsies.\par \par 2. Abdominal pain\par - May be related to above.\par \par Follow up in one month.

## 2022-04-22 NOTE — ADDENDUM
[FreeTextEntry1] : The risks and benefits of my recommendations, as well as other treatment options were discussed with the patient today. Questions were answered.\par \par Please feel free to contact for any questions or concerns at my office  in the telephone numbers listed below.\par \par [address], [address], [place], [place], [address] Telephone: [phone] Fax: [phone]\par \par \par

## 2022-04-22 NOTE — CONSULT LETTER
[Dear  ___] : Dear  [unfilled], [Consult Letter:] : I had the pleasure of evaluating your patient, [unfilled]. [Please see my note below.] : Please see my note below. [Consult Closing:] : Thank you very much for allowing me to participate in the care of this patient.  If you have any questions, please do not hesitate to contact me. [Sincerely,] : Sincerely, [FreeTextEntry3] : Koffi Mcgee MD\par \par Assistant Clinical Professor \par Division of Gastroenterology at [facility]\par [facility]\par [facility] at [facility]\par [facility]\par \par [address], [facility] [address], [place] [address]\par Tel: [phone] | Fax: [phone]\par \par Twitter (Personal): [email or web address] \par \par \par

## 2022-04-22 NOTE — HISTORY OF PRESENT ILLNESS
[Heartburn] : denies heartburn [Nausea] : denies nausea [Vomiting] : denies vomiting [Yellow Skin Or Eyes (Jaundice)] : denies jaundice [Abdominal Swelling] : denies abdominal swelling [Rectal Pain] : denies rectal pain [Diarrhea] : diarrhea [Constipation] : constipation [Abdominal Pain] : abdominal pain [Irritable Bowel Syndrome] : irritable bowel syndrome [Wt Gain ___ Lbs] : no recent weight gain [Wt Loss ___ Lbs] : no recent weight loss [GERD] : no gastroesophageal reflux disease [Hiatus Hernia] : no hiatus hernia [Peptic Ulcer Disease] : no peptic ulcer disease [Pancreatitis] : no pancreatitis [Cholelithiasis] : no cholelithiasis [Kidney Stone] : no kidney stone [Inflammatory Bowel Disease] : no inflammatory bowel disease [Diverticulitis] : no diverticulitis [Alcohol Abuse] : no alcohol abuse [Malignancy] : no malignancy [Abdominal Surgery] : no abdominal surgery [Appendectomy] : no appendectomy [Cholecystectomy] : no cholecystectomy [de-identified] : MIKY ESTRADA 27 year F with PCOS, Pre diabetes and obesity here to establish care for IBS-M for  3 years.\par \par She is under the care VALDO Peters for 2 years from [id number].  Underwent a colonoscopy 2019 and an endoscopy x 2 in 2018/ 2019. Apparently biopsies were positive for H pylori and was eradicated after antibiotics.\par \par Alternating bowel habits but mostly constipation, followed by loose stools close to menstrual cycle. Patient was prescribed Imodium as she was up at night with diarrhea 8-10,. Stools were watery, no blood or mucus. LLQ crampy abdominal pain 6/10 relieved after bowel movements. \par \par Patient states of a good appetite, no loss of weight,. Denies nausea, vomiting, melena, hematochezia, or hematemesis. Patient is on ozempique 0.5 mg a week. \par \par FH: No 1st degree or 2nd degree relative with colon cancer, gastric cancer or pancreatic cancer.\par

## 2022-05-10 ENCOUNTER — APPOINTMENT (OUTPATIENT)
Dept: NEUROLOGY | Facility: CLINIC | Age: 27
End: 2022-05-10
Payer: COMMERCIAL

## 2022-05-10 VITALS
OXYGEN SATURATION: 100 % | DIASTOLIC BLOOD PRESSURE: 79 MMHG | HEIGHT: 63 IN | HEART RATE: 92 BPM | TEMPERATURE: 97.6 F | SYSTOLIC BLOOD PRESSURE: 114 MMHG | BODY MASS INDEX: 27.46 KG/M2 | RESPIRATION RATE: 20 BRPM | WEIGHT: 155 LBS

## 2022-05-10 DIAGNOSIS — R06.83 SNORING: ICD-10-CM

## 2022-05-10 PROCEDURE — 99205 OFFICE O/P NEW HI 60 MIN: CPT

## 2022-05-19 NOTE — HISTORY OF PRESENT ILLNESS
[facility] [address] [place] [address] accepted by FEDE MORTON[[id number]] on 2016  2:16 PM    Unable to complete prescription refill per RN Medication Refill Policy.................... Titi Mcdermott RN ....................  2017   4:16 PM          
[FreeTextEntry1] : 27 year old f  with PCOS Pre diabetes, migraines and  presents, anxiety  with multiple complaints. She reports she was in her usual state of health until January 2021 when she had covid- had SOB, cough,HA fever. required home oxygen x1 month. never hospitalized. she returned to work after 2 months.Since then, memory has been off-STM, forgets tasks, difficultly with focusing.  \par she is able to be productive at work, writes things down. \par had been feeling weakness in limbs, since losing weight, has helped with this\par  \par Migraines became more frequent \par initial onset: Age 15\par Location:temporal, occipital \par described as pressure sharp and squeezing. \par Timing of headache: varies\par Associated symptoms: blurred vision photophobia, phonophobia, dizziness, n/v\par Pertinent negatives visual aura, scotoma, memory impairment, neck stiffness, nausea\par Relived by: dark room, rest,  OTC doesn’t help. triptans cause side effects\par Severity:  7/8\par Occurs;1week/month\par Duration: 1-4days  \par Sleeps:   at times, reports snoring \par Hydration: water:  96 oz caffeine: 1cup/d\par Triggers:rudy, lack of sleep, loud noise, bright lights.\par \par  \par  \par Diagnostics:\par Brain MRI & pit. reported normal \par  \par

## 2022-05-19 NOTE — PHYSICAL EXAM
[Person] : oriented to person [Place] : oriented to place [Time] : oriented to time [Concentration Intact] : normal concentrating ability [Naming Objects] : no difficulty naming common objects [Repeating Phrases] : no difficulty repeating a phrase [Fluency] : fluency intact [Comprehension] : comprehension intact [Past History] : adequate knowledge of personal past history [Cranial Nerves Optic (II)] : visual acuity intact bilaterally,  visual fields full to confrontation, pupils equal round and reactive to light [Cranial Nerves Oculomotor (III)] : extraocular motion intact [Cranial Nerves Trigeminal (V)] : facial sensation intact symmetrically [Cranial Nerves Facial (VII)] : face symmetrical [Cranial Nerves Vestibulocochlear (VIII)] : hearing was intact bilaterally [Cranial Nerves Glossopharyngeal (IX)] : tongue and palate midline [Cranial Nerves Accessory (XI - Cranial And Spinal)] : head turning and shoulder shrug symmetric [Cranial Nerves Hypoglossal (XII)] : there was no tongue deviation with protrusion [Motor Tone] : muscle tone was normal in all four extremities [Motor Strength] : muscle strength was normal in all four extremities [No Muscle Atrophy] : normal bulk in all four extremities [Sensation Tactile Decrease] : light touch was intact [Abnormal Walk] : normal gait [Balance] : balance was intact [2+] : Ankle jerk left 2+ [Short Term Intact] : short term memory intact [Remote Intact] : remote memory intact [Registration Intact] : recent registration memory intact [Total Score ___ / 30] : the patient achieved a score of [unfilled] /30 [Date / Time ___ / 5] : date / time [unfilled] / 5 [Place ___ / 5] : place [unfilled] / 5 [Registration ___ / 3] : registration [unfilled] / 3 [Serial Sevens ___/5] : serial sevens [unfilled] / 5 [Naming 2 Objects ___ / 2] : naming two objects [unfilled] / 2 [Repeating a Sentence ___ / 1] : repeating a sentence [unfilled] / 1 [Writing a Sentence ___ / 1] : write sentence [unfilled] / 1 [3-stage Verbal Command ___ / 3] : three-stage verbal command [unfilled] / 3 [Written Command ___ / 1] : written command [unfilled] / 1 [Copy a Design ___ / 1] : copy a design [unfilled] / 1 [Recall ___ / 3] : recall [unfilled] / 3 [Motor Handedness Right-Handed] : the patient is right hand dominant [PERRL With Normal Accommodation] : pupils were equal in size, round, reactive to light, with normal accommodation [Extraocular Movements] : extraocular movements were intact [Full Visual Field] : full visual field [General Appearance - In No Acute Distress] : in no acute distress [Mood] : the mood was normal [Current Events] : adequate knowledge of current events [Past-pointing] : there was no past-pointing [Tremor] : no tremor present [Plantar Reflex Right Only] : normal on the right [Plantar Reflex Left Only] : normal on the left [Neck Appearance] : the appearance of the neck was normal [] : no respiratory distress [Heart Sounds] : normal S1 and S2 [Edema] : there was no peripheral edema [No Spinal Tenderness] : no spinal tenderness [Involuntary Movements] : no involuntary movements were seen

## 2022-05-24 ENCOUNTER — APPOINTMENT (OUTPATIENT)
Dept: NEUROLOGY | Facility: CLINIC | Age: 27
End: 2022-05-24

## 2022-05-25 ENCOUNTER — INPATIENT (INPATIENT)
Facility: HOSPITAL | Age: 27
LOS: 0 days | Discharge: HOME | End: 2022-05-26
Attending: OBSTETRICS & GYNECOLOGY | Admitting: OBSTETRICS & GYNECOLOGY
Payer: COMMERCIAL

## 2022-05-25 ENCOUNTER — TRANSCRIPTION ENCOUNTER (OUTPATIENT)
Age: 27
End: 2022-05-25

## 2022-05-25 VITALS
TEMPERATURE: 98 F | OXYGEN SATURATION: 100 % | HEIGHT: 63 IN | DIASTOLIC BLOOD PRESSURE: 71 MMHG | SYSTOLIC BLOOD PRESSURE: 118 MMHG | HEART RATE: 98 BPM | RESPIRATION RATE: 18 BRPM

## 2022-05-25 DIAGNOSIS — Z90.89 ACQUIRED ABSENCE OF OTHER ORGANS: Chronic | ICD-10-CM

## 2022-05-25 DIAGNOSIS — N83.519 TORSION OF OVARY AND OVARIAN PEDICLE, UNSPECIFIED SIDE: Chronic | ICD-10-CM

## 2022-05-25 LAB
ALBUMIN SERPL ELPH-MCNC: 4.3 G/DL — SIGNIFICANT CHANGE UP (ref 3.3–5)
ALP SERPL-CCNC: 80 U/L — SIGNIFICANT CHANGE UP (ref 40–120)
ALT FLD-CCNC: 22 U/L — SIGNIFICANT CHANGE UP (ref 4–33)
ANION GAP SERPL CALC-SCNC: 10 MMOL/L — SIGNIFICANT CHANGE UP (ref 7–14)
APPEARANCE UR: CLEAR — SIGNIFICANT CHANGE UP
AST SERPL-CCNC: 19 U/L — SIGNIFICANT CHANGE UP (ref 4–32)
BASOPHILS # BLD AUTO: 0.02 K/UL — SIGNIFICANT CHANGE UP (ref 0–0.2)
BASOPHILS NFR BLD AUTO: 0.2 % — SIGNIFICANT CHANGE UP (ref 0–2)
BILIRUB SERPL-MCNC: 0.2 MG/DL — SIGNIFICANT CHANGE UP (ref 0.2–1.2)
BILIRUB UR-MCNC: NEGATIVE — SIGNIFICANT CHANGE UP
BUN SERPL-MCNC: 9 MG/DL — SIGNIFICANT CHANGE UP (ref 7–23)
CALCIUM SERPL-MCNC: 9.1 MG/DL — SIGNIFICANT CHANGE UP (ref 8.4–10.5)
CHLORIDE SERPL-SCNC: 105 MMOL/L — SIGNIFICANT CHANGE UP (ref 98–107)
CO2 SERPL-SCNC: 24 MMOL/L — SIGNIFICANT CHANGE UP (ref 22–31)
COLOR SPEC: YELLOW — SIGNIFICANT CHANGE UP
CREAT SERPL-MCNC: 0.71 MG/DL — SIGNIFICANT CHANGE UP (ref 0.5–1.3)
DIFF PNL FLD: NEGATIVE — SIGNIFICANT CHANGE UP
EGFR: 119 ML/MIN/1.73M2 — SIGNIFICANT CHANGE UP
EOSINOPHIL # BLD AUTO: 0.12 K/UL — SIGNIFICANT CHANGE UP (ref 0–0.5)
EOSINOPHIL NFR BLD AUTO: 1.4 % — SIGNIFICANT CHANGE UP (ref 0–6)
FLUAV AG NPH QL: SIGNIFICANT CHANGE UP
FLUBV AG NPH QL: SIGNIFICANT CHANGE UP
GLUCOSE SERPL-MCNC: 96 MG/DL — SIGNIFICANT CHANGE UP (ref 70–99)
GLUCOSE UR QL: NEGATIVE — SIGNIFICANT CHANGE UP
HCG SERPL-ACNC: <5 MIU/ML — SIGNIFICANT CHANGE UP
HCT VFR BLD CALC: 38.4 % — SIGNIFICANT CHANGE UP (ref 34.5–45)
HGB BLD-MCNC: 12 G/DL — SIGNIFICANT CHANGE UP (ref 11.5–15.5)
IANC: 5.63 K/UL — SIGNIFICANT CHANGE UP (ref 1.8–7.4)
IMM GRANULOCYTES NFR BLD AUTO: 0.2 % — SIGNIFICANT CHANGE UP (ref 0–1.5)
KETONES UR-MCNC: NEGATIVE — SIGNIFICANT CHANGE UP
LEUKOCYTE ESTERASE UR-ACNC: NEGATIVE — SIGNIFICANT CHANGE UP
LYMPHOCYTES # BLD AUTO: 2.42 K/UL — SIGNIFICANT CHANGE UP (ref 1–3.3)
LYMPHOCYTES # BLD AUTO: 27.9 % — SIGNIFICANT CHANGE UP (ref 13–44)
MAGNESIUM SERPL-MCNC: 1.8 MG/DL — SIGNIFICANT CHANGE UP (ref 1.6–2.6)
MCHC RBC-ENTMCNC: 28.3 PG — SIGNIFICANT CHANGE UP (ref 27–34)
MCHC RBC-ENTMCNC: 31.3 GM/DL — LOW (ref 32–36)
MCV RBC AUTO: 90.6 FL — SIGNIFICANT CHANGE UP (ref 80–100)
MONOCYTES # BLD AUTO: 0.46 K/UL — SIGNIFICANT CHANGE UP (ref 0–0.9)
MONOCYTES NFR BLD AUTO: 5.3 % — SIGNIFICANT CHANGE UP (ref 2–14)
NEUTROPHILS # BLD AUTO: 5.63 K/UL — SIGNIFICANT CHANGE UP (ref 1.8–7.4)
NEUTROPHILS NFR BLD AUTO: 65 % — SIGNIFICANT CHANGE UP (ref 43–77)
NITRITE UR-MCNC: NEGATIVE — SIGNIFICANT CHANGE UP
NRBC # BLD: 0 /100 WBCS — SIGNIFICANT CHANGE UP
NRBC # FLD: 0 K/UL — SIGNIFICANT CHANGE UP
PH UR: 6.5 — SIGNIFICANT CHANGE UP (ref 5–8)
PHOSPHATE SERPL-MCNC: 2.5 MG/DL — SIGNIFICANT CHANGE UP (ref 2.5–4.5)
PLATELET # BLD AUTO: 360 K/UL — SIGNIFICANT CHANGE UP (ref 150–400)
POTASSIUM SERPL-MCNC: 3.9 MMOL/L — SIGNIFICANT CHANGE UP (ref 3.5–5.3)
POTASSIUM SERPL-SCNC: 3.9 MMOL/L — SIGNIFICANT CHANGE UP (ref 3.5–5.3)
PROT SERPL-MCNC: 7 G/DL — SIGNIFICANT CHANGE UP (ref 6–8.3)
PROT UR-MCNC: ABNORMAL
RBC # BLD: 4.24 M/UL — SIGNIFICANT CHANGE UP (ref 3.8–5.2)
RBC # FLD: 13.3 % — SIGNIFICANT CHANGE UP (ref 10.3–14.5)
RSV RNA NPH QL NAA+NON-PROBE: SIGNIFICANT CHANGE UP
SARS-COV-2 RNA SPEC QL NAA+PROBE: SIGNIFICANT CHANGE UP
SODIUM SERPL-SCNC: 139 MMOL/L — SIGNIFICANT CHANGE UP (ref 135–145)
SP GR SPEC: 1.03 — SIGNIFICANT CHANGE UP (ref 1–1.05)
UROBILINOGEN FLD QL: SIGNIFICANT CHANGE UP
WBC # BLD: 8.67 K/UL — SIGNIFICANT CHANGE UP (ref 3.8–10.5)
WBC # FLD AUTO: 8.67 K/UL — SIGNIFICANT CHANGE UP (ref 3.8–10.5)

## 2022-05-25 PROCEDURE — 74176 CT ABD & PELVIS W/O CONTRAST: CPT | Mod: 26,MG

## 2022-05-25 PROCEDURE — G1004: CPT

## 2022-05-25 PROCEDURE — 76830 TRANSVAGINAL US NON-OB: CPT | Mod: 26

## 2022-05-25 PROCEDURE — 99218: CPT

## 2022-05-25 RX ORDER — ONDANSETRON 8 MG/1
4 TABLET, FILM COATED ORAL ONCE
Refills: 0 | Status: COMPLETED | OUTPATIENT
Start: 2022-05-25 | End: 2022-05-26

## 2022-05-25 RX ORDER — MORPHINE SULFATE 50 MG/1
2 CAPSULE, EXTENDED RELEASE ORAL ONCE
Refills: 0 | Status: DISCONTINUED | OUTPATIENT
Start: 2022-05-25 | End: 2022-05-25

## 2022-05-25 RX ORDER — SODIUM CHLORIDE 9 MG/ML
1000 INJECTION, SOLUTION INTRAVENOUS ONCE
Refills: 0 | Status: COMPLETED | OUTPATIENT
Start: 2022-05-25 | End: 2022-05-25

## 2022-05-25 RX ORDER — MORPHINE SULFATE 50 MG/1
4 CAPSULE, EXTENDED RELEASE ORAL ONCE
Refills: 0 | Status: DISCONTINUED | OUTPATIENT
Start: 2022-05-25 | End: 2022-05-25

## 2022-05-25 RX ORDER — KETOROLAC TROMETHAMINE 30 MG/ML
15 SYRINGE (ML) INJECTION ONCE
Refills: 0 | Status: DISCONTINUED | OUTPATIENT
Start: 2022-05-25 | End: 2022-05-25

## 2022-05-25 RX ORDER — ACETAMINOPHEN 500 MG
1000 TABLET ORAL ONCE
Refills: 0 | Status: COMPLETED | OUTPATIENT
Start: 2022-05-25 | End: 2022-05-25

## 2022-05-25 RX ORDER — SODIUM CHLORIDE 9 MG/ML
1000 INJECTION INTRAMUSCULAR; INTRAVENOUS; SUBCUTANEOUS
Refills: 0 | Status: DISCONTINUED | OUTPATIENT
Start: 2022-05-25 | End: 2022-05-26

## 2022-05-25 RX ADMIN — Medication 1000 MILLIGRAM(S): at 17:13

## 2022-05-25 RX ADMIN — SODIUM CHLORIDE 125 MILLILITER(S): 9 INJECTION INTRAMUSCULAR; INTRAVENOUS; SUBCUTANEOUS at 18:03

## 2022-05-25 RX ADMIN — SODIUM CHLORIDE 1000 MILLILITER(S): 9 INJECTION, SOLUTION INTRAVENOUS at 12:44

## 2022-05-25 RX ADMIN — Medication 15 MILLIGRAM(S): at 14:18

## 2022-05-25 RX ADMIN — MORPHINE SULFATE 4 MILLIGRAM(S): 50 CAPSULE, EXTENDED RELEASE ORAL at 21:53

## 2022-05-25 RX ADMIN — Medication 400 MILLIGRAM(S): at 12:59

## 2022-05-25 RX ADMIN — MORPHINE SULFATE 2 MILLIGRAM(S): 50 CAPSULE, EXTENDED RELEASE ORAL at 18:03

## 2022-05-25 RX ADMIN — Medication 15 MILLIGRAM(S): at 17:12

## 2022-05-25 RX ADMIN — MORPHINE SULFATE 2 MILLIGRAM(S): 50 CAPSULE, EXTENDED RELEASE ORAL at 17:07

## 2022-05-25 NOTE — CONSULT NOTE ADULT - SUBJECTIVE AND OBJECTIVE BOX
INCOMPLETE NOTE    MIKY ESTRADA  27y  Female [id number]    HPI: 27yoF LMP 5/w RLQ pain from 9a today. Denies fevers, chills, CP, SOB, vaginal bleeding. Currently sexually active. No vaginal discharge.    Name of GYN Physician:   POB:      Home meds:     Hospital Meds:   MEDICATIONS  (STANDING):  morphine  - Injectable 2 milliGRAM(s) IV Push Once  sodium chloride 0.9%. 1000 milliLiter(s) (125 mL/Hr) IV Continuous <Continuous>    Allergies  eggs (Diarrhea)  fish (Hives)  iodine (Hives)  Seafood (Unknown)  shellfish (Anaphylaxis)    PAST MEDICAL & SURGICAL HISTORY:  Ovarian torsion  IUD (intrauterine device) in place  Glomerulonephritis, streptococcal, acute  Anxiety and depression  Hirsutism  Migraine  COVID-19  Ovarian torsion  History of tonsillectomy    Vital Signs Last 24 Hrs  T(C): 36.7 (25 May 2022 17:00), Max: 36.9 (25 May 2022 12:50)  T(F): 98 (25 May 2022 17:00), Max: 98.4 (25 May 2022 12:50)  HR: 92 (25 May 2022 17:00) (85 - 98)  BP: 112/77 (25 May 2022 17:00) (103/64 - 118/71)  BP(mean): --  RR: 18 (25 May 2022 17:00) (18 - 22)  SpO2: 100% (25 May 2022 17:00) (100% - 100%)    Physical Exam:   General: sitting comftorably in bed, NAD   HEENT: neck supple, full ROM  CV: RR S1S2 no m/r/g  Lungs: CTA b/l, good air flow b/l   Back: No CVA tenderness  Abd: Soft, RLQ-tenderness on deep palpation, non-distended.   :  No bleeding on pad.    External labia wnl.  Bimanual exam with no cervical motion tenderness, uterus wnl, adnexa non palpable b/l.  Cervix closed   Speculum Exam: No active bleeding from os.  Physiologic discharge.    Ext: non-tender b/l, no edema     LABS:                        12.0   8.67  )-----------( 360      ( 25 May 2022 12:45 )             38.4     05-    139  |  105  |  9   ----------------------------<  96  3.9   |  24  |  0.71    Ca    9.1      25 May 2022 12:45  Phos  2.5       Mg     1.80         TPro  7.0  /  Alb  4.3  /  TBili  0.2  /  DBili  x   /  AST  19  /  ALT  22  /  AlkPhos  80      Urinalysis Basic - ( 25 May 2022 12:45 )  Color: Yllow / Appearance: Clear / S.028 / pH: x  Gluc: x / Ketone: Negative  / Bili: Negative / Urobili: <2 mg/dL   Blood: x / Protein: Trace / Nitrite: Negative   Leuk Esterase: Negative / RBC: x / WBC x   Sq Epi: x / Non Sq Epi: x / Bacteria: x    RADIOLOGY & ADDITIONAL STUDIES:  < from: US Transvaginal (22 @ 14:08) >  ACC: [id number] EXAM:  US TRANSVAGINAL                          PROCEDURE DATE:  2022          INTERPRETATION:  CLINICAL INFORMATION: Right lower quadrant pain.    LMP: 2022.    COMPARISON: 2020.    TECHNIQUE:  Endovaginal pelvic sonogram only. Color and Spectral Doppler was   performed.    FINDINGS:  Uterus: 5.6 cm x 2.8 cm x 3.5 cm. Within normal limits.  Endometrium: 10 mm. Within normal limits.    Right ovary: 2.9 cm x 2.3 cm x 1.8 cm. Within normal limits. Normal   arterial and venous waveforms.  Left ovary: 3.2 cm x 1.7 cm x 2.2 cm. Contains a corpus luteal cyst of 2   cm. Normal arterial and venous waveforms.    Fluid: Small free fluid in bilateral adnexa, likely physiologic.    IMPRESSION:  Normal pelvic sonogram.        --- End of Report ---            GER CARDOZA MD; Attending Radiologist  This document has been electronically signed. May 25 2022  2:14PM    < end of copied text >  < from: CT Abdomen and Pelvis No Cont (22 @ 15:40) >  ACC: [id number] EXAM:  CT ABDOMEN AND PELVIS                          PROCEDURE DATE:  2022          INTERPRETATION:  CLINICAL INFORMATION: Right lower quadrant abdominal   pain. Evaluate for acute appendicitis.    COMPARISON: None.    CONTRAST/COMPLICATIONS:  IV Contrast: None  Oral Contrast: None  Complications: None reported at the time of exam    PROCEDURE:  CT of the Abdomen and Pelvis was performed.  Sagittal and coronal reformats were performed.    FINDINGS:  LOWER CHEST: Within normal limits.    LIVER: Within normal limits.  BILE DUCTS: Normal caliber.  GALLBLADDER: Within normal limits.  SPLEEN: Within normal limits.  PANCREAS: Within normal limits.  ADRENALS: Within normal limits.  KIDNEYS/URETERS: Within normal limits.    BLADDER: Within normal limits.  REPRODUCTIVE ORGANS: Uterus and bilateral adnexa within normal limits.    BOWEL: No bowel obstruction. Appendix is not visualized. No evidence of   inflammation in the pericecal region.  PERITONEUM: No ascites. Trace free fluid in the pelvis, likely   physiologic.  VESSELS: Within normal limits.  RETROPERITONEUM/LYMPH NODES: No lymphadenopathy.  ABDOMINAL WALL: Within normal limits.  BONES: Within normal limits.    IMPRESSION:  No evidence of acute abnormality in the abdomen and pelvis.    Small amount of free fluid in the pelvis, likely physiologic.      DARLINE ETIENNE MD; Attending Radiologist  This document has been electronically signed. May 25 2022  3:43PM   MIKY ESTRADA  27y  Female [id number]    HPI: 27yoF LMP / who presents w/ RLQ pain from 9a today. Reports the pain as sharp/stabbing and localized to the RLQ. Pt reports similar episodes of pain at multiple points throughout her life. States that she has had multiple episodes of ruptured cysts, which resulted in pain similar to the pain she is experiencing today. First episode of ruptured cyst was in , which was also accompanied by torsion of right ovary for which she underwent surgery for detorsion (ovaries left in). Pt denies fevers, chills, CP, SOB, vaginal bleeding. Denies abnormal vaginal discharge, dysuria. Pt has IBS and had an episode of diarrhea yesterday but had a normal, formed bowel movement today. Currently sexually active. Reports full STI workup negative 3 months ago.       Name of GYN Physician:   POB: G0     Home meds: [facility] Meds:   MEDICATIONS  (STANDING):  morphine  - Injectable 2 milliGRAM(s) IV Push Once  sodium chloride 0.9%. 1000 milliLiter(s) (125 mL/Hr) IV Continuous <Continuous>    Allergies  eggs (Diarrhea)  fish (Hives)  iodine (Hives)  Seafood (Unknown)  shellfish (Anaphylaxis)    PAST MEDICAL & SURGICAL HISTORY:  Ovarian torsion s/p surgical detorsion with no removal of organs   IUD (intrauterine device) in place  Glomerulonephritis, streptococcal, acute  Anxiety and depression  Hirsutism  Migraine  COVID-19  Ovarian torsion  History of tonsillectomy    Vital Signs Last 24 Hrs  T(C): 36.7 (25 May 2022 17:00), Max: 36.9 (25 May 2022 12:50)  T(F): 98 (25 May 2022 17:00), Max: 98.4 (25 May 2022 12:50)  HR: 92 (25 May 2022 17:00) (85 - 98)  BP: 112/77 (25 May 2022 17:00) (103/64 - 118/71)  BP(mean): --  RR: 18 (25 May 2022 17:00) (18 - 22)  SpO2: 100% (25 May 2022 17:00) (100% - 100%)    Physical Exam:   General: sitting comftorably in bed, NAD   HEENT: neck supple, full ROM  CV: RR S1S2 no m/r/g  Lungs: CTA b/l, good air flow b/l   Back: No CVA tenderness  Abd: Soft, RLQ-tenderness on deep palpation, non-distended.   :  No bleeding on pad. External labia wnl. Bimanual exam with no cervical motion tenderness, uterus wnl, adnexa non palpable b/l.  Cervix closed   Speculum Exam: No active bleeding from os.  Physiologic discharge.    Ext: non-tender b/l, no edema     LABS:                        12.0   8.67  )-----------( 360      ( 25 May 2022 12:45 )             38.4         139  |  105  |  9   ----------------------------<  96  3.9   |  24  |  0.71    Ca    9.1      25 May 2022 12:45  Phos  2.5       Mg     1.80         TPro  7.0  /  Alb  4.3  /  TBili  0.2  /  DBili  x   /  AST  19  /  ALT  22  /  AlkPhos  80      Urinalysis Basic - ( 25 May 2022 12:45 )  Color: Yllow / Appearance: Clear / S.028 / pH: x  Gluc: x / Ketone: Negative  / Bili: Negative / Urobili: <2 mg/dL   Blood: x / Protein: Trace / Nitrite: Negative   Leuk Esterase: Negative / RBC: x / WBC x   Sq Epi: x / Non Sq Epi: x / Bacteria: x    RADIOLOGY & ADDITIONAL STUDIES:  < from: US Transvaginal (22 @ 14:08) >  ACC: [id number] EXAM:  US TRANSVAGINAL                          PROCEDURE DATE:  2022          INTERPRETATION:  CLINICAL INFORMATION: Right lower quadrant pain.    LMP: 2022.    COMPARISON: 2020.    TECHNIQUE:  Endovaginal pelvic sonogram only. Color and Spectral Doppler was   performed.    FINDINGS:  Uterus: 5.6 cm x 2.8 cm x 3.5 cm. Within normal limits.  Endometrium: 10 mm. Within normal limits.    Right ovary: 2.9 cm x 2.3 cm x 1.8 cm. Within normal limits. Normal   arterial and venous waveforms.  Left ovary: 3.2 cm x 1.7 cm x 2.2 cm. Contains a corpus luteal cyst of 2   cm. Normal arterial and venous waveforms.    Fluid: Small free fluid in bilateral adnexa, likely physiologic.    IMPRESSION:  Normal pelvic sonogram.        --- End of Report ---            GER CARDOZA MD; Attending Radiologist  This document has been electronically signed. May 25 2022  2:14PM    < end of copied text >  < from: CT Abdomen and Pelvis No Cont (22 @ 15:40) >  ACC: [id number] EXAM:  CT ABDOMEN AND PELVIS                          PROCEDURE DATE:  2022          INTERPRETATION:  CLINICAL INFORMATION: Right lower quadrant abdominal   pain. Evaluate for acute appendicitis.    COMPARISON: None.    CONTRAST/COMPLICATIONS:  IV Contrast: None  Oral Contrast: None  Complications: None reported at the time of exam    PROCEDURE:  CT of the Abdomen and Pelvis was performed.  Sagittal and coronal reformats were performed.    FINDINGS:  LOWER CHEST: Within normal limits.    LIVER: Within normal limits.  BILE DUCTS: Normal caliber.  GALLBLADDER: Within normal limits.  SPLEEN: Within normal limits.  PANCREAS: Within normal limits.  ADRENALS: Within normal limits.  KIDNEYS/URETERS: Within normal limits.    BLADDER: Within normal limits.  REPRODUCTIVE ORGANS: Uterus and bilateral adnexa within normal limits.    BOWEL: No bowel obstruction. Appendix is not visualized. No evidence of   inflammation in the pericecal region.  PERITONEUM: No ascites. Trace free fluid in the pelvis, likely   physiologic.  VESSELS: Within normal limits.  RETROPERITONEUM/LYMPH NODES: No lymphadenopathy.  ABDOMINAL WALL: Within normal limits.  BONES: Within normal limits.    IMPRESSION:  No evidence of acute abnormality in the abdomen and pelvis.    Small amount of free fluid in the pelvis, likely physiologic.      DARLINE ETIENNE MD; Attending Radiologist  This document has been electronically signed. May 25 2022  3:43PM

## 2022-05-25 NOTE — CHART NOTE - NSCHARTNOTEFT_GEN_A_CORE
Patient evaluated at due to pain. Pt notes that she is having RLQ pain, 10/10, radiating to back. She notes associated nausea and anorexia. Does not h/o diarrhea. States she is unsure if this feels like previous torsion due to how long ago it was, but she did have a large cyst at that time.     Objective  T(C): 36.8 (05-25-22 @ 22:52), Max: 36.9 (05-25-22 @ 12:50)  HR: 87 (05-25-22 @ 22:52) (85 - 102)  BP: 106/62 (05-25-22 @ 22:52) (103/64 - 113/72)  RR: 18 (05-25-22 @ 22:52) (18 - 22)  SpO2: 100% (05-25-22 @ 22:52) (97% - 100%)  Wt(kg): --      Gen: Crying in pain  Abd: Soft, ND, +LLQ tenderness otherwise non-tender, no rebound, no guarding; + obturator sign   Ext: NT BL    ondansetron Injectable 4 milliGRAM(s) IV Push once  sodium chloride 0.9%. 1000 milliLiter(s) IV Continuous <Continuous>    A/P: 27yoF LMP 5/13 presenting today with RLQ pain with no associated right adnexal tenderness or CMT and no abnormal pelvic / adnexal findings on CT or TVUS. No evidence of ovarian torsion at this time given negative imaging (normal oavry with no cysts on right, no edema, no hyperemia, nl flow) and findings c/w torsion on initial evaluation. + Obturator sign possibly indicative of intraperitoneal inflammation vs appendicitis with no appendix visualized on CT.    - Rec further workup of RLQ pain given no findings indicative of GYN etiology, consider gen surg consult to further eval for appendicitis given this is on differential     Antonella Boss, PGY-2  D/w Dr Holt and Dr Steen Patient evaluated at due to pain. Pt notes that she is having RLQ pain, 10/10, radiating to back. She notes associated nausea and anorexia. Does not h/o diarrhea. States she is unsure if this feels like previous torsion due to how long ago it was, but she did have a large cyst at that time.     Objective  T(C): 36.8 (05-25-22 @ 22:52), Max: 36.9 (05-25-22 @ 12:50)  HR: 87 (05-25-22 @ 22:52) (85 - 102)  BP: 106/62 (05-25-22 @ 22:52) (103/64 - 113/72)  RR: 18 (05-25-22 @ 22:52) (18 - 22)  SpO2: 100% (05-25-22 @ 22:52) (97% - 100%)  Wt(kg): --      Gen: Crying in pain  Abd: Soft, ND, +LLQ tenderness otherwise non-tender, no rebound, no guarding; + obturator sign   Ext: NT BL    ondansetron Injectable 4 milliGRAM(s) IV Push once  sodium chloride 0.9%. 1000 milliLiter(s) IV Continuous <Continuous>    A/P: 27yoF LMP 5/13 presenting today with RLQ pain with no associated right adnexal tenderness or CMT and no abnormal pelvic / adnexal findings on CT or TVUS. No evidence of ovarian torsion at this time given negative imaging (normal oavry with no cysts on right, no edema, no hyperemia, nl flow) and findings c/w torsion on initial evaluation. + Obturator sign possibly indicative of intraperitoneal inflammation vs appendicitis with no appendix visualized on CT.    - Rec further workup of RLQ pain given no findings indicative of GYN etiology, rec gen surg consult to further eval for appendicitis given this is on differential     Antonella Boss, PGY-2  D/w Dr Holt and Dr Steen Patient evaluated at due to pain. Pt notes that she is having RLQ pain, 10/10, radiating to back. She notes associated nausea and anorexia. Does not h/o diarrhea. States she is unsure if this feels like previous torsion due to how long ago it was, but she did have a large cyst at that time.     Objective  T(C): 36.8 (05-25-22 @ 22:52), Max: 36.9 (05-25-22 @ 12:50)  HR: 87 (05-25-22 @ 22:52) (85 - 102)  BP: 106/62 (05-25-22 @ 22:52) (103/64 - 113/72)  RR: 18 (05-25-22 @ 22:52) (18 - 22)  SpO2: 100% (05-25-22 @ 22:52) (97% - 100%)  Wt(kg): --      Gen: Crying in pain  Abd: Soft, ND, +LLQ tenderness otherwise non-tender, no rebound, no guarding; + obturator sign   Ext: NT BL    ondansetron Injectable 4 milliGRAM(s) IV Push once  sodium chloride 0.9%. 1000 milliLiter(s) IV Continuous <Continuous>    A/P: 27yoF LMP 5/13 presenting today with RLQ pain with no associated right adnexal tenderness or CMT and no abnormal pelvic / adnexal findings on CT or TVUS. No evidence of ovarian torsion at this time given negative imaging (normal oavry with no cysts on right, no edema, no hyperemia, nl flow) and no findings c/w torsion on initial evaluation. + Obturator sign possibly indicative of intraperitoneal inflammation vs appendicitis with no appendix visualized on CT.    - Rec further workup of RLQ pain given no findings indicative of GYN etiology, rec gen surg consult to further eval for appendicitis given this is on differential     Antonella Boss, PGY-2  D/w Dr Holt and Dr Steen

## 2022-05-25 NOTE — ED PROVIDER NOTE - PROGRESS NOTE DETAILS
NOE BARRIOS: as per OBGYN, recommending CDU for observation, serial abd exam. CDU consulted. Will continue to monitor and reassess. NOE BARRIOS: spoke with OBGYN, recommend keeping patient NPO. Pt accepted to CDU.

## 2022-05-25 NOTE — ED PROVIDER NOTE - NSICDXPASTMEDICALHX_GEN_ALL_CORE_FT
PAST MEDICAL HISTORY:  Anxiety and depression     COVID-19     Glomerulonephritis, streptococcal, acute     Hirsutism     IUD (intrauterine device) in place     Migraine     Ovarian cyst     Ovarian torsion

## 2022-05-25 NOTE — ED ADULT NURSE NOTE - OBJECTIVE STATEMENT
Patient is a 26 yo female, h/x PCOS, ovarian torsion, asthma, depression, anxiety, presenting with RLQ pain since 9 am. Patient reports pain radiates to R lower back and feels similar to previous ruptured cyst, had one episode of diarrhea last night. AAOx4, no signs of distress, denies nausea, vomiting, fever, chills, chest pain, shortness of breath, urinary symptoms. Placed on cardiac monitor, NSR, VSS. 20G PIV placed to RAC, labs sent per orders. Pending ultrasound. Fall precautions maintained.

## 2022-05-25 NOTE — ED ADULT TRIAGE NOTE - CHIEF COMPLAINT QUOTE
Pt presents to ED ambulatory from home with c/o RLQ abdominal pain. Pt has hx of PCOS and reports this feels similar to previous flares. Pt has hx of ovarian torsion secondary to PCOS. Pt denies vaginal bleeding.

## 2022-05-25 NOTE — ED CDU PROVIDER INITIAL DAY NOTE - PROGRESS NOTE DETAILS
Pt with another bout of RLQ abd pain. GYN paged. GYN evaluated have low suspicion for torsion. Pt pain controlled with morphine. Pt afebrile without leukocytosis. Will continue to monitor and perform serial abdominal exams. If pain continues to persist will plan to repeat CTAP with oral and IV contrast Pt with another bout of RLQ abd pain. GYN paged. GYN evaluated have low suspicion for torsion. Pt pain controlled with morphine. Pt afebrile without leukocytosis. Will continue to monitor and perform serial abdominal exams. If pain continues to persist will plan to get MRI abdomen. Pt reports history of anaphylaxis to IV contrast

## 2022-05-25 NOTE — ED ADULT NURSE REASSESSMENT NOTE - NS ED NURSE REASSESS COMMENT FT1
Received report from day shift RN. Pt A&ox4, amb. Pt currently crying and endorsing pain in the RLQ radiating to middle of lower quadrant area. Pt vitally stable. NOE Vail made aware OBGYN to see pt.

## 2022-05-25 NOTE — ED PROVIDER NOTE - OBJECTIVE STATEMENT
28 y/o F with PMH anxiety, Ovarian cysts, PCOS, ovarian torsion, presents to ED c/o right lower quadrant pain sudden onset since this morning. Reports sharp pain, 9/10, constant, non-radiating. Admits similar symptoms to previous rupture cysts. Denies any fever, chills, n/v/d/c, chest pain, sob, back pain, vaginal bleeding, vaginal discharge, dysuria, hematuria, or any other complaints.

## 2022-05-25 NOTE — ED CDU PROVIDER INITIAL DAY NOTE - DETAILS
26 y/o female hx pcos/ovarian torsion c/o abdominal pain  -negative ct/us in ER, seen by obgyn  -pain control, serial abd exams  -obgyn reassess am

## 2022-05-25 NOTE — ED CDU PROVIDER INITIAL DAY NOTE - ATTENDING APP SHARED VISIT CONTRIBUTION OF CARE
28 yo f past medical history anxiety, ovarian cysts, PCOS, hx of ovarian torsion with RLQ pain. no fever or chills. no trauma. no urinary complaints. US and CT without significant abnormality to explain pain. pain improved with meds but still mild tenderness and needing re-dosing of meds. exam as above, mild rlq ttp no r/g. plan: symptom relief prn, serial abd exams, OB re-eval.

## 2022-05-25 NOTE — CONSULT NOTE ADULT - ASSESSMENT
27yoF LMP 5/13/w RLQ pain from 9a today. Patient has hx of ovarian torsion requiring surgical intervention. At this time patient does have abdominal pain but no systemic signs of torsion.   - WBC wnl, imaging benign, no Nausea or vomiting.  - due to hx, patient warrants monitoring with serial abdominal exams.  - Pain medication PRN  - Keep NPO while monitored.    seen with Dr.Altmann Issa Benson PGY3  27yoF LMP 5/13 presenting today with RLQ pain from 9a today. Patient has hx of ovarian torsion requiring surgical intervention. At this time patient does have abdominal pain but no systemic signs of torsion.   - WBC wnl, imaging benign, no Nausea or vomiting.  - due to hx, patient warrants monitoring with serial abdominal exams.  - Pain medication PRN  - Keep NPO while monitored.    seen with Dr. Simone Benson PGY3

## 2022-05-25 NOTE — ED PROVIDER NOTE - CLINICAL SUMMARY MEDICAL DECISION MAKING FREE TEXT BOX
28 y/o F with PMH anxiety, Ovarian cysts, PCOS p/w right lower quadrant pain sudden onset since this am. Pt states she had her lmp 2 weeks prior and today in the am she had sudden onset sharp pain in the right lower quadrant that came in waves. concern for possible torsion vs ruptures cyst vs less likely kidney stone. cbc, cmp, ua, tvus, pain control ,reassessment. may require ob eval

## 2022-05-25 NOTE — CONSULT NOTE ADULT - ATTENDING COMMENTS
saw patient yesterday and since had sign pelvic pain on deep palpation and h/o prior torsion, decided to observe her overnight and if pain increases for possible OR

## 2022-05-25 NOTE — ED CDU PROVIDER INITIAL DAY NOTE - OBJECTIVE STATEMENT
28 y/o F with PMH anxiety, Ovarian cysts, PCOS, ovarian torsion, presents to ED c/o right lower quadrant pain sudden onset since this morning. Reports sharp pain, 9/10, constant, non-radiating. Admits similar symptoms to previous rupture cysts. Denies any fever, chills, n/v/d/c, chest pain, sob, back pain, vaginal bleeding, vaginal discharge, dysuria, hematuria, or any other complaints.    CDU Note: Agree with above. 28 y/o female hx anxiety, pcos, ovarian torsion presenting to ER c/o RLQ abdominal pain - in ER TVUS and CT a/p negative. Seen and examined by obgyn - recommending CDU for pain control, serial abdominal exams and reassessment. Pt. currently c/o 8/10 pain but mildly imroved from earlier but now rising again.

## 2022-05-25 NOTE — ED PROVIDER NOTE - NS ED ATTENDING STATEMENT MOD
This was a shared visit with the [facility]. I reviewed and verified the documentation and independently performed the documented:

## 2022-05-25 NOTE — ED PROVIDER NOTE - ATTENDING APP SHARED VISIT CONTRIBUTION OF CARE
28 y/o F with PMH anxiety, Ovarian cysts, PCOS p/w right lower quadrant pain sudden onset since this am. Pt states she had her lmp 2 weeks prior and today in the am she had sudden onset sharp pain in the right lower quadrant that came in waves. pain is 8/10 worse with movement. She denies vomiting, fever, chills, chest pain. She states she had similar symptoms when she had ovarian torsion and when she had ruptured cysts in past. She reports normal bowel movement  denies fever, chills, chest pain, SOB, abdominal pain, diarrhea, dysuria, syncope, bleeding, new rash, weakness, numbness, blurred vision    ROS  otherwise negative as per HPI  Gen: Awake, Alert, WD, WN, NAD  Head:  NC/AT  Eyes:  PERRL, EOMI, Conjunctiva pink, lids normal, no scleral icterus  ENT: OP clear, no exudates,  moist mucus membranes  Neck: supple, nontender, no meningismus, no JVD, trachea midline  Cardiac/CV:  S1 S2, RRR, no M/G/R  Respiratory/Pulm:  CTAB, good air movement, normal resp effort, no wheezes/stridor/retractions/rales/rhonchi  Gastrointestinal/Abdomen:  Soft, tender RLQ  nondistended, +BS, no rebound/guarding  Back:  no CVAT, no MLT  Ext:  warm, well perfused, moving all extremities spontaneously, no peripheral edema, distal pulses intact  Skin: intact, no rash  Neuro:  AAOx3, sensation intact, motor 5/5 x 4 extremities, normal gait, speech clear  mdm as above

## 2022-05-26 ENCOUNTER — APPOINTMENT (OUTPATIENT)
Dept: CARDIOLOGY | Facility: CLINIC | Age: 27
End: 2022-05-26

## 2022-05-26 ENCOUNTER — TRANSCRIPTION ENCOUNTER (OUTPATIENT)
Age: 27
End: 2022-05-26

## 2022-05-26 VITALS
RESPIRATION RATE: 18 BRPM | HEART RATE: 91 BPM | SYSTOLIC BLOOD PRESSURE: 110 MMHG | DIASTOLIC BLOOD PRESSURE: 72 MMHG | OXYGEN SATURATION: 96 %

## 2022-05-26 DIAGNOSIS — R10.2 PELVIC AND PERINEAL PAIN: ICD-10-CM

## 2022-05-26 LAB
ANION GAP SERPL CALC-SCNC: 9 MMOL/L — SIGNIFICANT CHANGE UP (ref 7–14)
APTT BLD: 36.6 SEC — HIGH (ref 27–36.3)
BASOPHILS # BLD AUTO: 0.03 K/UL — SIGNIFICANT CHANGE UP (ref 0–0.2)
BASOPHILS NFR BLD AUTO: 0.4 % — SIGNIFICANT CHANGE UP (ref 0–2)
BLD GP AB SCN SERPL QL: NEGATIVE — SIGNIFICANT CHANGE UP
BUN SERPL-MCNC: 7 MG/DL — SIGNIFICANT CHANGE UP (ref 7–23)
CALCIUM SERPL-MCNC: 8.9 MG/DL — SIGNIFICANT CHANGE UP (ref 8.4–10.5)
CHLORIDE SERPL-SCNC: 105 MMOL/L — SIGNIFICANT CHANGE UP (ref 98–107)
CO2 SERPL-SCNC: 24 MMOL/L — SIGNIFICANT CHANGE UP (ref 22–31)
CREAT SERPL-MCNC: 0.74 MG/DL — SIGNIFICANT CHANGE UP (ref 0.5–1.3)
EGFR: 113 ML/MIN/1.73M2 — SIGNIFICANT CHANGE UP
EOSINOPHIL # BLD AUTO: 0.1 K/UL — SIGNIFICANT CHANGE UP (ref 0–0.5)
EOSINOPHIL NFR BLD AUTO: 1.2 % — SIGNIFICANT CHANGE UP (ref 0–6)
GLUCOSE SERPL-MCNC: 79 MG/DL — SIGNIFICANT CHANGE UP (ref 70–99)
HCT VFR BLD CALC: 36.6 % — SIGNIFICANT CHANGE UP (ref 34.5–45)
HGB BLD-MCNC: 11.5 G/DL — SIGNIFICANT CHANGE UP (ref 11.5–15.5)
IANC: 5.25 K/UL — SIGNIFICANT CHANGE UP (ref 1.8–7.4)
IMM GRANULOCYTES NFR BLD AUTO: 0.2 % — SIGNIFICANT CHANGE UP (ref 0–1.5)
INR BLD: 1.08 RATIO — SIGNIFICANT CHANGE UP (ref 0.88–1.16)
LYMPHOCYTES # BLD AUTO: 2.49 K/UL — SIGNIFICANT CHANGE UP (ref 1–3.3)
LYMPHOCYTES # BLD AUTO: 29.7 % — SIGNIFICANT CHANGE UP (ref 13–44)
MCHC RBC-ENTMCNC: 28 PG — SIGNIFICANT CHANGE UP (ref 27–34)
MCHC RBC-ENTMCNC: 31.4 GM/DL — LOW (ref 32–36)
MCV RBC AUTO: 89.1 FL — SIGNIFICANT CHANGE UP (ref 80–100)
MONOCYTES # BLD AUTO: 0.48 K/UL — SIGNIFICANT CHANGE UP (ref 0–0.9)
MONOCYTES NFR BLD AUTO: 5.7 % — SIGNIFICANT CHANGE UP (ref 2–14)
NEUTROPHILS # BLD AUTO: 5.25 K/UL — SIGNIFICANT CHANGE UP (ref 1.8–7.4)
NEUTROPHILS NFR BLD AUTO: 62.8 % — SIGNIFICANT CHANGE UP (ref 43–77)
NRBC # BLD: 0 /100 WBCS — SIGNIFICANT CHANGE UP
NRBC # FLD: 0 K/UL — SIGNIFICANT CHANGE UP
PLATELET # BLD AUTO: 350 K/UL — SIGNIFICANT CHANGE UP (ref 150–400)
POTASSIUM SERPL-MCNC: 4.3 MMOL/L — SIGNIFICANT CHANGE UP (ref 3.5–5.3)
POTASSIUM SERPL-SCNC: 4.3 MMOL/L — SIGNIFICANT CHANGE UP (ref 3.5–5.3)
PROTHROM AB SERPL-ACNC: 12.5 SEC — SIGNIFICANT CHANGE UP (ref 10.5–13.4)
RBC # BLD: 4.11 M/UL — SIGNIFICANT CHANGE UP (ref 3.8–5.2)
RBC # FLD: 13.2 % — SIGNIFICANT CHANGE UP (ref 10.3–14.5)
RH IG SCN BLD-IMP: POSITIVE — SIGNIFICANT CHANGE UP
SODIUM SERPL-SCNC: 138 MMOL/L — SIGNIFICANT CHANGE UP (ref 135–145)
WBC # BLD: 8.37 K/UL — SIGNIFICANT CHANGE UP (ref 3.8–10.5)
WBC # FLD AUTO: 8.37 K/UL — SIGNIFICANT CHANGE UP (ref 3.8–10.5)

## 2022-05-26 PROCEDURE — 49320 DIAG LAPARO SEPARATE PROC: CPT | Mod: GC

## 2022-05-26 PROCEDURE — 99217: CPT

## 2022-05-26 PROCEDURE — 74181 MRI ABDOMEN W/O CONTRAST: CPT | Mod: 26,MA

## 2022-05-26 PROCEDURE — 72195 MRI PELVIS W/O DYE: CPT | Mod: 26,MA

## 2022-05-26 RX ORDER — KETOROLAC TROMETHAMINE 30 MG/ML
30 SYRINGE (ML) INJECTION ONCE
Refills: 0 | Status: DISCONTINUED | OUTPATIENT
Start: 2022-05-26 | End: 2022-05-26

## 2022-05-26 RX ORDER — SODIUM CHLORIDE 9 MG/ML
1000 INJECTION, SOLUTION INTRAVENOUS
Refills: 0 | Status: DISCONTINUED | OUTPATIENT
Start: 2022-05-26 | End: 2022-05-26

## 2022-05-26 RX ORDER — ONDANSETRON 8 MG/1
4 TABLET, FILM COATED ORAL ONCE
Refills: 0 | Status: DISCONTINUED | OUTPATIENT
Start: 2022-05-26 | End: 2022-05-26

## 2022-05-26 RX ORDER — ACETAMINOPHEN 500 MG
2 TABLET ORAL
Qty: 0 | Refills: 0 | DISCHARGE

## 2022-05-26 RX ORDER — MORPHINE SULFATE 50 MG/1
4 CAPSULE, EXTENDED RELEASE ORAL ONCE
Refills: 0 | Status: DISCONTINUED | OUTPATIENT
Start: 2022-05-26 | End: 2022-05-26

## 2022-05-26 RX ORDER — HYDROMORPHONE HYDROCHLORIDE 2 MG/ML
0.5 INJECTION INTRAMUSCULAR; INTRAVENOUS; SUBCUTANEOUS
Refills: 0 | Status: DISCONTINUED | OUTPATIENT
Start: 2022-05-26 | End: 2022-05-26

## 2022-05-26 RX ORDER — IBUPROFEN 200 MG
1 TABLET ORAL
Qty: 0 | Refills: 0 | DISCHARGE

## 2022-05-26 RX ORDER — ONDANSETRON 8 MG/1
4 TABLET, FILM COATED ORAL ONCE
Refills: 0 | Status: COMPLETED | OUTPATIENT
Start: 2022-05-26 | End: 2022-05-26

## 2022-05-26 RX ADMIN — HYDROMORPHONE HYDROCHLORIDE 0.5 MILLIGRAM(S): 2 INJECTION INTRAMUSCULAR; INTRAVENOUS; SUBCUTANEOUS at 11:15

## 2022-05-26 RX ADMIN — SODIUM CHLORIDE 125 MILLILITER(S): 9 INJECTION INTRAMUSCULAR; INTRAVENOUS; SUBCUTANEOUS at 06:26

## 2022-05-26 RX ADMIN — MORPHINE SULFATE 4 MILLIGRAM(S): 50 CAPSULE, EXTENDED RELEASE ORAL at 02:04

## 2022-05-26 RX ADMIN — MORPHINE SULFATE 4 MILLIGRAM(S): 50 CAPSULE, EXTENDED RELEASE ORAL at 00:10

## 2022-05-26 RX ADMIN — HYDROMORPHONE HYDROCHLORIDE 0.5 MILLIGRAM(S): 2 INJECTION INTRAMUSCULAR; INTRAVENOUS; SUBCUTANEOUS at 11:30

## 2022-05-26 RX ADMIN — SODIUM CHLORIDE 125 MILLILITER(S): 9 INJECTION, SOLUTION INTRAVENOUS at 11:16

## 2022-05-26 RX ADMIN — MORPHINE SULFATE 4 MILLIGRAM(S): 50 CAPSULE, EXTENDED RELEASE ORAL at 06:03

## 2022-05-26 RX ADMIN — MORPHINE SULFATE 4 MILLIGRAM(S): 50 CAPSULE, EXTENDED RELEASE ORAL at 03:15

## 2022-05-26 RX ADMIN — MORPHINE SULFATE 4 MILLIGRAM(S): 50 CAPSULE, EXTENDED RELEASE ORAL at 06:18

## 2022-05-26 RX ADMIN — ONDANSETRON 4 MILLIGRAM(S): 8 TABLET, FILM COATED ORAL at 00:07

## 2022-05-26 RX ADMIN — MORPHINE SULFATE 4 MILLIGRAM(S): 50 CAPSULE, EXTENDED RELEASE ORAL at 00:25

## 2022-05-26 RX ADMIN — HYDROMORPHONE HYDROCHLORIDE 0.5 MILLIGRAM(S): 2 INJECTION INTRAMUSCULAR; INTRAVENOUS; SUBCUTANEOUS at 12:30

## 2022-05-26 RX ADMIN — HYDROMORPHONE HYDROCHLORIDE 0.5 MILLIGRAM(S): 2 INJECTION INTRAMUSCULAR; INTRAVENOUS; SUBCUTANEOUS at 12:45

## 2022-05-26 RX ADMIN — ONDANSETRON 4 MILLIGRAM(S): 8 TABLET, FILM COATED ORAL at 03:42

## 2022-05-26 RX ADMIN — Medication 30 MILLIGRAM(S): at 04:12

## 2022-05-26 NOTE — BRIEF OPERATIVE NOTE - NSICDXBRIEFPOSTOP_GEN_ALL_CORE_FT
POST-OP DIAGNOSIS:  Pelvic pain 26-May-2022 10:50:47  Issa Benson   : #[id number]  Patient is followed up for depression, SI and substance use disorder (ETOH abuse), Chart, medications and labs reviewed.  Patient is discussed with treatment team. No significant overnight issues. Remains on CIWA protocol (0 this morning) and Ativan taper.  Patient describes mood as “a little bit better.” Reports still feeling depressed and anxious, sad affect, and agrees to further increase in prozac to 20 mg PO QD.  Endorses +AH, states 2 male voices telling him they will hurt him and pt states he now feels there is something behind him, but when he turns to look, nothing is there. Pt paranoid that someone will hurt him, but states he feels safe on the unit. Reports voices have decreased in intensity and frequency and agrees to further increase in haldol to 10 mg PO QHS (pt states the +AH is worse at night and this keeps him from sleeping).  Patient denies any urges to self-harm today, denies SIIP or HIIP and agrees to come to staff immediately with any safety concerns.  Compliance with medication is good, no reported or observed adverse effects.

## 2022-05-26 NOTE — ASU DISCHARGE PLAN (ADULT/PEDIATRIC) - NURSING INSTRUCTIONS
You received IV Tylenol for pain management at 10:05AM. Please DO NOT take any Tylenol (Acetaminophen) containing products, such as Vicodin, Percocet, Excedrin, and cold medications for the next 6 hours (until 4:05PM). DO NOT TAKE MORE THAN 3000 MG OF TYLENOL in a 24 hour period. You received IV Tylenol for pain management at 10:05AM. Please DO NOT take any Tylenol (Acetaminophen) containing products, such as Vicodin, Percocet, Excedrin, and cold medications for the next 8 hours (until 6:05PM). DO NOT TAKE MORE THAN 3000 MG OF TYLENOL in a 24 hour period.

## 2022-05-26 NOTE — H&P ADULT - NSHPLABSRESULTS_GEN_ALL_CORE
Vital Signs Last 24 Hrs  T(C): 36.3 (26 May 2022 06:33), Max: 36.9 (25 May 2022 12:50)  T(F): 97.4 (26 May 2022 06:33), Max: 98.5 (25 May 2022 20:37)  HR: 86 (26 May 2022 06:33) (85 - 102)  BP: 105/62 (26 May 2022 06:33) (103/64 - 118/71)  BP(mean): --  RR: 18 (26 May 2022 06:33) (17 - 22)  SpO2: 100% (26 May 2022 06:33) (97% - 100%)      LABS:                        11.5   8.37  )-----------( 350      ( 26 May 2022 06:54 )             36.6         139  |  105  |  9   ----------------------------<  96  3.9   |  24  |  0.71    Ca    9.1      25 May 2022 12:45  Phos  2.5       Mg     1.80         TPro  7.0  /  Alb  4.3  /  TBili  0.2  /  DBili  x   /  AST  19  /  ALT  22  /  AlkPhos  80  25      Urinalysis Basic - ( 25 May 2022 12:45 )    Color: Yellow / Appearance: Clear / S.028 / pH: x  Gluc: x / Ketone: Negative  / Bili: Negative / Urobili: <2 mg/dL   Blood: x / Protein: Trace / Nitrite: Negative   Leuk Esterase: Negative / RBC: x / WBC x   Sq Epi: x / Non Sq Epi: x / Bacteria: x      US Transvaginal (22 @ 14:08)    Uterus: 5.6 cm x 2.8 cm x 3.5 cm. WNL  Endometrium: 10 mm. WNL    R ovary: 2.9 cm x 2.3 cm x 1.8 cm. WNL. Normal   arterial and venous waveforms.  L ovary: 3.2 cm x 1.7 cm x 2.2 cm. Contains a corpus luteal cyst of 2   cm. Normal arterial and venous waveforms.    Fluid: Small free fluid in bilateral adnexa, likely physiologic.    IMPRESSION:  Normal pelvic sonogram.    CT Abdomen and Pelvis No Cont (22 @ 15:40)   No evidence of acute abnormality in the abdomen and pelvis.    Small amount of free fluid in the pelvis, likely physiologic.    MR Abdomen/Pelvis No Cont (22 @ 03:28)    Nonvisualized appendix without secondary findings of acute appendicitis.   Recommend clinical correlation.    Mild bladder wall thickening, difficult to assess secondary to inadequate   distention. Correlate with urinalysis and lab values to assess for   cystitis and/or ascending urinary tract infection.    Trace pelvic free fluid, likely physiologic.

## 2022-05-26 NOTE — H&P ADULT - ATTENDING COMMENTS
patient still in pain. Recommend surgical diagnosis- diagnostic laparoscopy, risks of surgery reviewed

## 2022-05-26 NOTE — ED CDU PROVIDER DISPOSITION NOTE - CLINICAL COURSE
28 y/o F with PMH anxiety, Ovarian cysts, PCOS, ovarian torsion, presents to ED c/o right lower quadrant pain sudden onset since this morning. Reports sharp pain, 9/10, constant, non-radiating. Admits similar symptoms to previous rupture cysts. Denies any fever, chills, n/v/d/c, chest pain, sob, back pain, vaginal bleeding, vaginal discharge, dysuria, hematuria, or any other complaints.  in ER TVUS and CT a/p negative. Seen and examined by obgyn - recommending CDU for pain control, serial abdominal exams and reassessment. While in CDU pt persistently having RLQ abd pain. MR abdomen & pelvis negative for acute pathology. Upon OBGYN reassessment this morning, decision was made to admit patient and plan for OR for ex lap. Plan discussed with patient. Pt to be admitted to OBGYN team.

## 2022-05-26 NOTE — H&P ADULT - ASSESSMENT
27yoF LMP 5/13 with hx of r. ovarian torsion s/p laparoscopy (2009) admitted for diagnostic laparoscopy today for r/o ovarian torsion after presenting with worsening RLQ pain but neg radiologic findings.  - pt added on to OR schedule, to be taken back urgently   - NPO since midnight   - no s/s of systemic infection   - post-op care pending OR findings     d/w Dr. Simone Wood, PGY-1

## 2022-05-26 NOTE — ASU DISCHARGE PLAN (ADULT/PEDIATRIC) - CARE PROVIDER_API CALL
Boston Zamorano  OBSTETRICS AND GYNECOLOGY  [address], [address]  [place] [address]  Phone: [phone]  Fax: [phone]  Follow Up Time: 1 week

## 2022-05-26 NOTE — ASU DISCHARGE PLAN (ADULT/PEDIATRIC) - ASU DC SPECIAL INSTRUCTIONSFT
Regular diet as tolerated, regular activity as tolerated, no heavy lifting for first two weeks.  Nothing per vagina: no intercourse, tampons or douching. No submerging. Call your provider if you experience fevers, chills, worsening abdominal pain, inability to urinate or worsening vaginal bleeding.  Follow up with your provider in 2 weeks.

## 2022-05-26 NOTE — CHART NOTE - NSCHARTNOTEFT_GEN_A_CORE
Patient evaluated at bedside this morning. Received 30mg Toradol. Pt sleeping comfortably in bed.     Objective  T(C): 36.7 (05-26-22 @ 02:00), Max: 36.9 (05-25-22 @ 20:37)  HR: 88 (05-26-22 @ 03:15) (87 - 102)  BP: 104/52 (05-26-22 @ 03:15) (104/52 - 113/70)  RR: 18 (05-26-22 @ 03:15) (17 - 18)  SpO2: 100% (05-26-22 @ 03:15) (97% - 100%)  Wt(kg): --      Gen: NAD  Pulm: nl WOB on RA  Ext: WWP    ketorolac   Injectable 30 milliGRAM(s) IV Push once  sodium chloride 0.9%. 1000 milliLiter(s) IV Continuous <Continuous>    < from: MR Pelvis No Cont (05.26.22 @ 03:28) >      ACC: [id number] EXAM:  MR PELVIS                        ACC: [id number] EXAM:  MR ABDOMEN                          PROCEDURE DATE:  05/26/2022          INTERPRETATION:  CLINICAL INDICATION: Right lower quadrant abdominal pain.    TECHNIQUE: Multiecho, multiplanar magnetic resonance imaging of the   abdomen and pelvis was performed without intravenous contrast.    COMPARISON: CT abdomen and pelvis 5/25/2022.    FINDINGS:    Nonvisualized appendix without pericecal inflammation identified to   suggest appendicitis. No restricted diffusion identified in the right   lower quadrant.    Abundant fecal material identified within the colonic loops, concerning   for constipation.    Mild bladder wall thickening, difficult to assess secondary to inadequate   distention.    Retroverted uterus. Bilateral ovaries are normal. Trace pelvic free   fluid, likely physiologic.    Visualized portions of the solid abdominal viscera demonstrate normal   signal intensity.    Disc degeneration identified at L5-S1.Bone marrow signal intensity is   normal.    IMPRESSION:    Nonvisualized appendix without secondary findings of acute appendicitis.   Recommend clinical correlation.    Mild bladder wall thickening, difficult to assess secondary to inadequate   distention. Correlate with urinalysis and lab values to assess for   cystitis and/or ascending urinary tract infection.    Trace pelvic free fluid, likely physiologic.      --- End of Report ---            AUBREY YOU MD; Attending Radiologist  This document has been electronically signed. May 26 2022  4:46AM    < end of copied text >      A/P: 27yoF LMP 5/13 presenting today with RLQ pain with no associated right adnexal tenderness or CMT and no abnormal pelvic / adnexal findings on CT or TVUS, now with normal MRI. Pt comfortable after Toradol dosing. Will continue to monitor closely with serial abdominal exams.    - Pain control with IV Toradol / Tylenol  - GYN will continue to follow closely     Antonella Boss, PGY-2  D/w Dr Steen.

## 2022-05-26 NOTE — BRIEF OPERATIVE NOTE - OPERATION/FINDINGS
Grossly normal uterus, bilateral fallopian tubes and ovaries  Grossly normal appearing appendix  Moderate amount of pelvic fluid drained from posterior cul-de-sac

## 2022-05-26 NOTE — H&P ADULT - NSHPPHYSICALEXAM_GEN_ALL_CORE
Physical Exam:   General: sitting comfortably in bed, NAD   HEENT: neck supple, full ROM  CV: RR S1S2 no m/r/g  Lungs: CTA b/l, good air flow b/l   Back: No CVA tenderness  Abd: Soft, RLQ-tenderness on deep palpation, non-distended.   :  No bleeding on pad. External labia wnl. Bimanual exam with no cervical motion tenderness, uterus wnl, adnexa non palpable b/l.  Cervix closed   Speculum Exam: No active bleeding from os.  Physiologic discharge.    Ext: non-tender b/l, no edema

## 2022-05-26 NOTE — ASU DISCHARGE PLAN (ADULT/PEDIATRIC) - NS MD DC FALL RISK RISK
For information on Fall & Injury Prevention, visit: [email or web address] OR  [email or web address] OR  https://www.cdc.gov/steadi/patient.html

## 2022-05-26 NOTE — ED CDU PROVIDER SUBSEQUENT DAY NOTE - ATTENDING APP SHARED VISIT CONTRIBUTION OF CARE
28 yo f presents for rlq pain. ct, tvus without clear cause of pain identified in cdu for serial abd exams and pain control. see my exam in prior note. patient with persistent pain and admitted to OBGYN service for further management.

## 2022-05-26 NOTE — DISCHARGE NOTE PROVIDER - NSDCMRMEDTOKEN_GEN_ALL_CORE_FT
ibuprofen 600 mg oral tablet: 1 tab(s) orally every 6 hours  Tylenol 8 Hour 650 mg oral tablet, extended release: 2 tab(s) orally every 8 hours

## 2022-05-26 NOTE — ED CDU PROVIDER SUBSEQUENT DAY NOTE - PROGRESS NOTE DETAILS
Pt with another bout of RLQ abd pain. GYN paged. GYN evaluated have low suspicion for torsion. Pt pain controlled with morphine. Pt afebrile without leukocytosis. Will continue to monitor and perform serial abdominal exams. If pain continues to persist will plan to get MRI abdomen. Pt reports history of anaphylaxis to IV contrast

## 2022-05-26 NOTE — ASU DISCHARGE PLAN (ADULT/PEDIATRIC) - ACTIVITY LEVEL
No excercise/No heavy lifting/No sports/gym/No weight bearing/Nothing per vagina/No tub baths/No douching

## 2022-05-26 NOTE — DISCHARGE NOTE PROVIDER - HOSPITAL COURSE
27yoF presenting with RLQ abdominal pain associated with nausea that progressively worsened overnight. Patient had history of ocarian torsion there diagnostic laparoscopy was performed. No acute findings were present. Pain likely associated with ovulation as pelvic fluid was present but also can be GI related. Plan to followup outpatient.

## 2022-05-26 NOTE — ED CDU PROVIDER DISPOSITION NOTE - ATTENDING CONTRIBUTION TO CARE
28 yo f presents for rlq pain. ct, tvus without clear cause of pain identified in cdu for serial abd exams and pain control. see my exam in prior note. patient with persistent pain and admitted to OBGYN service for further management. no

## 2022-05-26 NOTE — DISCHARGE NOTE PROVIDER - NSDCFUSCHEDAPPT_GEN_ALL_CORE_FT
Shaquille Gill  [facility] Physician [facility]  Cardio [address]  Scheduled Appointment: 05/26/2022    Koffi Mcgee  [facility]  Gastro [address]  Scheduled Appointment: 06/10/2022    Uri Taveras  [facility]  IntMed [address]  Scheduled Appointment: 07/06/2022    Chantel Bell  [facility]  NEUROLOGY [address]  Scheduled Appointment: 07/19/2022

## 2022-05-26 NOTE — ASU DISCHARGE PLAN (ADULT/PEDIATRIC) - FOLLOW UP APPOINTMENTS
may also call Recovery Room (PACU) 24/7 @ [phone]/[facility]

## 2022-05-26 NOTE — ED CDU PROVIDER SUBSEQUENT DAY NOTE - NS ED MD PROGRESS NOTE PROVIDER NAME FT
Detail Level: Detailed
General Sunscreen Counseling: I recommended a broad spectrum sunscreen with a SPF of 30 or higher.  I explained that SPF 30 sunscreens block approximately 97 percent of the sun's harmful rays.  Sunscreens should be applied at least 15 minutes prior to expected sun exposure and then every 2 hours after that as long as sun exposure continues. If swimming or exercising sunscreen should be reapplied every 45 minutes to an hour after getting wet or sweating.  One ounce, or the equivalent of a shot glass full of sunscreen, is adequate to protect the skin not covered by a bathing suit. I also recommended a lip balm with a sunscreen as well. Sun protective clothing can be used in lieu of sunscreen but must be worn the entire time you are exposed to the sun's rays.\\n\\nThe ABCDEs of melanoma were reviewed with the patient, and the importance of monthly self-examination of moles was emphasized.  Should any moles change in shape or color, or itch, bleed or burn, pt will contact our office for evaluation sooner then their interval appointment.
[place]

## 2022-05-26 NOTE — DISCHARGE NOTE PROVIDER - CARE PROVIDER_API CALL
Boston Zamorano  OBSTETRICS AND GYNECOLOGY  [address], [address]  [place] [address]  Phone: [phone]  Fax: [phone]  Follow Up Time: 1 week

## 2022-05-26 NOTE — ED CDU PROVIDER SUBSEQUENT DAY NOTE - HISTORY
26 y/o female hx anxiety, pcos, ovarian torsion presenting to ER c/o RLQ abdominal pain - in ER TVUS and CT a/p negative. Seen and examined by obgyn - recommending CDU for pain control, serial abdominal exams and reassessment.   Pt still having intermittent pain requiring IV analgesia. MR abdomen and pelvis obtained, pending results. GYN following. Will come and reassess patient this morning.

## 2022-05-27 ENCOUNTER — APPOINTMENT (OUTPATIENT)
Dept: INTERNAL MEDICINE | Facility: CLINIC | Age: 27
End: 2022-05-27
Payer: COMMERCIAL

## 2022-05-27 VITALS
DIASTOLIC BLOOD PRESSURE: 74 MMHG | RESPIRATION RATE: 25 BRPM | HEIGHT: 63 IN | TEMPERATURE: 98 F | HEART RATE: 106 BPM | BODY MASS INDEX: 27.46 KG/M2 | SYSTOLIC BLOOD PRESSURE: 107 MMHG | WEIGHT: 155 LBS | OXYGEN SATURATION: 99 %

## 2022-05-27 PROCEDURE — 99496 TRANSJ CARE MGMT HIGH F2F 7D: CPT

## 2022-05-27 RX ORDER — SERTRALINE HYDROCHLORIDE 50 MG/1
50 TABLET, FILM COATED ORAL
Qty: 90 | Refills: 0 | Status: DISCONTINUED | COMMUNITY
End: 2022-05-27

## 2022-05-27 RX ORDER — METFORMIN ER 500 MG 500 MG/1
500 TABLET ORAL DAILY
Qty: 90 | Refills: 1 | Status: DISCONTINUED | COMMUNITY
Start: 2021-07-06 | End: 2022-05-27

## 2022-05-27 RX ORDER — AZITHROMYCIN 250 MG/1
250 TABLET, FILM COATED ORAL
Qty: 1 | Refills: 0 | Status: DISCONTINUED | COMMUNITY
Start: 2021-12-24 | End: 2022-05-27

## 2022-05-27 NOTE — REVIEW OF SYSTEMS
[Fever] : no fever [Chills] : chills [Fatigue] : no fatigue [Hot Flashes] : no hot flashes [Night Sweats] : no night sweats [Recent Change In Weight] : ~T no recent weight change [Shortness Of Breath] : shortness of breath [Wheezing] : no wheezing [Cough] : cough [Dyspnea on Exertion] : no dyspnea on exertion [Abdominal Pain] : abdominal pain [Nausea] : no nausea [Constipation] : no constipation [Diarrhea] : diarrhea [Vomiting] : no vomiting [Heartburn] : no heartburn [Melena] : no melena [Joint Pain] : no joint pain [Joint Stiffness] : no joint stiffness [Joint Swelling] : no joint swelling [Muscle Weakness] : no muscle weakness [Muscle Pain] : muscle pain [Back Pain] : no back pain [Negative] : Psychiatric

## 2022-05-27 NOTE — PHYSICAL EXAM
[Well Nourished] : well nourished [Well Developed] : well developed [Normal Sclera/Conjunctiva] : normal sclera/conjunctiva [Clear to Auscultation] : lungs were clear to auscultation bilaterally [Coordination Grossly Intact] : coordination grossly intact [No Focal Deficits] : no focal deficits [Normal Insight/Judgement] : insight and judgment were intact [de-identified] : severly distressed [de-identified] : cough, short breaths, pain on deep inhalation [de-identified] : deferred due to significant and diffuse pain [de-identified] : distressed [90624 - High Complexity requires an extensive number of possible diagnoses and/or the management options, extensive complexity of the medical data (tests, etc.) to be reviewed, and a high risk of significant complications, morbidity, and/or mortality as w] : High Complexity

## 2022-05-27 NOTE — HISTORY OF PRESENT ILLNESS
[Post-hospitalization from ___ Hospital] : Post-hospitalization from [unfilled] Hospital [Admitted on: ___] : The patient was admitted on [unfilled] [Discharged on ___] : discharged on [unfilled] [Discharge Summary] : discharge summary [Pertinent Labs] : pertinent labs [Radiology Findings] : radiology findings [Discharge Med List] : discharge medication list [Med Reconciliation] : medication reconciliation has been completed [Patient Contacted By: ____] : and contacted by [unfilled] [FreeTextEntry2] : 27 year F with PMH pre-DM, exercise induced asthma, and IBS presents after exploratory surgery for pelvic pain. Pt is in significant pain. Pt denies CP/SOB, fever, n/v/d/c.

## 2022-05-27 NOTE — PLAN
[FreeTextEntry1] : Plan as above. Pt advised to sign up for [facility] portal to review labs and communicate any questions or concerns directly. Yearly physical and return as needed for illness, medication refills, and new or existing complaints.

## 2022-05-31 ENCOUNTER — TRANSCRIPTION ENCOUNTER (OUTPATIENT)
Age: 27
End: 2022-05-31

## 2022-05-31 ENCOUNTER — APPOINTMENT (OUTPATIENT)
Dept: RADIOLOGY | Facility: CLINIC | Age: 27
End: 2022-05-31
Payer: COMMERCIAL

## 2022-05-31 ENCOUNTER — EMERGENCY (EMERGENCY)
Facility: HOSPITAL | Age: 27
LOS: 1 days | Discharge: HOME | End: 2022-05-31
Attending: EMERGENCY MEDICINE
Payer: COMMERCIAL

## 2022-05-31 VITALS
WEIGHT: 154.1 LBS | TEMPERATURE: 98 F | OXYGEN SATURATION: 99 % | DIASTOLIC BLOOD PRESSURE: 74 MMHG | RESPIRATION RATE: 22 BRPM | HEART RATE: 116 BPM | HEIGHT: 63 IN | SYSTOLIC BLOOD PRESSURE: 109 MMHG

## 2022-05-31 DIAGNOSIS — Z90.89 ACQUIRED ABSENCE OF OTHER ORGANS: Chronic | ICD-10-CM

## 2022-05-31 DIAGNOSIS — N83.519 TORSION OF OVARY AND OVARIAN PEDICLE, UNSPECIFIED SIDE: Chronic | ICD-10-CM

## 2022-05-31 LAB
ALBUMIN SERPL ELPH-MCNC: 4.5 G/DL — SIGNIFICANT CHANGE UP (ref 3.3–5)
ALP SERPL-CCNC: 88 U/L — SIGNIFICANT CHANGE UP (ref 40–120)
ALT FLD-CCNC: 27 U/L — SIGNIFICANT CHANGE UP (ref 10–45)
ANION GAP SERPL CALC-SCNC: 13 MMOL/L — SIGNIFICANT CHANGE UP (ref 5–17)
APPEARANCE UR: CLEAR — SIGNIFICANT CHANGE UP
APTT BLD: 33.9 SEC — SIGNIFICANT CHANGE UP (ref 27.5–35.5)
AST SERPL-CCNC: 17 U/L — SIGNIFICANT CHANGE UP (ref 10–40)
BACTERIA # UR AUTO: NEGATIVE — SIGNIFICANT CHANGE UP
BASOPHILS # BLD AUTO: 0.05 K/UL — SIGNIFICANT CHANGE UP (ref 0–0.2)
BASOPHILS NFR BLD AUTO: 0.5 % — SIGNIFICANT CHANGE UP (ref 0–2)
BILIRUB SERPL-MCNC: 0.5 MG/DL — SIGNIFICANT CHANGE UP (ref 0.2–1.2)
BILIRUB UR-MCNC: NEGATIVE — SIGNIFICANT CHANGE UP
BLD GP AB SCN SERPL QL: NEGATIVE — SIGNIFICANT CHANGE UP
BUN SERPL-MCNC: 9 MG/DL — SIGNIFICANT CHANGE UP (ref 7–23)
CALCIUM SERPL-MCNC: 9 MG/DL — SIGNIFICANT CHANGE UP (ref 8.4–10.5)
CHLORIDE SERPL-SCNC: 104 MMOL/L — SIGNIFICANT CHANGE UP (ref 96–108)
CO2 SERPL-SCNC: 21 MMOL/L — LOW (ref 22–31)
COLOR SPEC: SIGNIFICANT CHANGE UP
CREAT SERPL-MCNC: 0.62 MG/DL — SIGNIFICANT CHANGE UP (ref 0.5–1.3)
DIFF PNL FLD: NEGATIVE — SIGNIFICANT CHANGE UP
EGFR: 124 ML/MIN/1.73M2 — SIGNIFICANT CHANGE UP
EOSINOPHIL # BLD AUTO: 0.18 K/UL — SIGNIFICANT CHANGE UP (ref 0–0.5)
EOSINOPHIL NFR BLD AUTO: 1.7 % — SIGNIFICANT CHANGE UP (ref 0–6)
EPI CELLS # UR: 3 /HPF — SIGNIFICANT CHANGE UP
GLUCOSE SERPL-MCNC: 91 MG/DL — SIGNIFICANT CHANGE UP (ref 70–99)
GLUCOSE UR QL: NEGATIVE — SIGNIFICANT CHANGE UP
HCG SERPL-ACNC: <2 MIU/ML — SIGNIFICANT CHANGE UP
HCG UR QL: NEGATIVE — SIGNIFICANT CHANGE UP
HCT VFR BLD CALC: 38.1 % — SIGNIFICANT CHANGE UP (ref 34.5–45)
HGB BLD-MCNC: 12 G/DL — SIGNIFICANT CHANGE UP (ref 11.5–15.5)
HYALINE CASTS # UR AUTO: 1 /LPF — SIGNIFICANT CHANGE UP (ref 0–2)
IMM GRANULOCYTES NFR BLD AUTO: 0.5 % — SIGNIFICANT CHANGE UP (ref 0–1.5)
INR BLD: 1.11 RATIO — SIGNIFICANT CHANGE UP (ref 0.88–1.16)
KETONES UR-MCNC: NEGATIVE — SIGNIFICANT CHANGE UP
LEUKOCYTE ESTERASE UR-ACNC: NEGATIVE — SIGNIFICANT CHANGE UP
LYMPHOCYTES # BLD AUTO: 2.43 K/UL — SIGNIFICANT CHANGE UP (ref 1–3.3)
LYMPHOCYTES # BLD AUTO: 22.4 % — SIGNIFICANT CHANGE UP (ref 13–44)
MCHC RBC-ENTMCNC: 27.3 PG — SIGNIFICANT CHANGE UP (ref 27–34)
MCHC RBC-ENTMCNC: 31.5 GM/DL — LOW (ref 32–36)
MCV RBC AUTO: 86.8 FL — SIGNIFICANT CHANGE UP (ref 80–100)
MONOCYTES # BLD AUTO: 0.45 K/UL — SIGNIFICANT CHANGE UP (ref 0–0.9)
MONOCYTES NFR BLD AUTO: 4.1 % — SIGNIFICANT CHANGE UP (ref 2–14)
NEUTROPHILS # BLD AUTO: 7.7 K/UL — HIGH (ref 1.8–7.4)
NEUTROPHILS NFR BLD AUTO: 70.8 % — SIGNIFICANT CHANGE UP (ref 43–77)
NITRITE UR-MCNC: NEGATIVE — SIGNIFICANT CHANGE UP
NRBC # BLD: 0 /100 WBCS — SIGNIFICANT CHANGE UP (ref 0–0)
PH UR: 6 — SIGNIFICANT CHANGE UP (ref 5–8)
PLATELET # BLD AUTO: 374 K/UL — SIGNIFICANT CHANGE UP (ref 150–400)
POTASSIUM SERPL-MCNC: 3.8 MMOL/L — SIGNIFICANT CHANGE UP (ref 3.5–5.3)
POTASSIUM SERPL-SCNC: 3.8 MMOL/L — SIGNIFICANT CHANGE UP (ref 3.5–5.3)
PROT SERPL-MCNC: 7.3 G/DL — SIGNIFICANT CHANGE UP (ref 6–8.3)
PROT UR-MCNC: NEGATIVE — SIGNIFICANT CHANGE UP
PROTHROM AB SERPL-ACNC: 12.9 SEC — SIGNIFICANT CHANGE UP (ref 10.5–13.4)
RBC # BLD: 4.39 M/UL — SIGNIFICANT CHANGE UP (ref 3.8–5.2)
RBC # FLD: 13.1 % — SIGNIFICANT CHANGE UP (ref 10.3–14.5)
RBC CASTS # UR COMP ASSIST: 1 /HPF — SIGNIFICANT CHANGE UP (ref 0–4)
RH IG SCN BLD-IMP: POSITIVE — SIGNIFICANT CHANGE UP
SODIUM SERPL-SCNC: 138 MMOL/L — SIGNIFICANT CHANGE UP (ref 135–145)
SP GR SPEC: 1.02 — SIGNIFICANT CHANGE UP (ref 1.01–1.02)
UROBILINOGEN FLD QL: NEGATIVE — SIGNIFICANT CHANGE UP
WBC # BLD: 10.86 K/UL — HIGH (ref 3.8–10.5)
WBC # FLD AUTO: 10.86 K/UL — HIGH (ref 3.8–10.5)
WBC UR QL: 2 /HPF — SIGNIFICANT CHANGE UP (ref 0–5)

## 2022-05-31 PROCEDURE — 71046 X-RAY EXAM CHEST 2 VIEWS: CPT

## 2022-05-31 PROCEDURE — 74176 CT ABD & PELVIS W/O CONTRAST: CPT | Mod: 26,MA

## 2022-05-31 PROCEDURE — 71250 CT THORAX DX C-: CPT | Mod: 26,MA

## 2022-05-31 PROCEDURE — 99203 OFFICE O/P NEW LOW 30 MIN: CPT | Mod: GC

## 2022-05-31 PROCEDURE — 76830 TRANSVAGINAL US NON-OB: CPT | Mod: 26

## 2022-05-31 PROCEDURE — 93975 VASCULAR STUDY: CPT | Mod: 26

## 2022-05-31 PROCEDURE — 99285 EMERGENCY DEPT VISIT HI MDM: CPT

## 2022-05-31 PROCEDURE — 74018 RADEX ABDOMEN 1 VIEW: CPT | Mod: 26

## 2022-05-31 PROCEDURE — 76705 ECHO EXAM OF ABDOMEN: CPT | Mod: 26,59

## 2022-05-31 RX ORDER — ACETAMINOPHEN 500 MG
1000 TABLET ORAL ONCE
Refills: 0 | Status: COMPLETED | OUTPATIENT
Start: 2022-05-31 | End: 2022-05-31

## 2022-05-31 RX ORDER — ONDANSETRON 8 MG/1
4 TABLET, FILM COATED ORAL ONCE
Refills: 0 | Status: COMPLETED | OUTPATIENT
Start: 2022-05-31 | End: 2022-05-31

## 2022-05-31 RX ORDER — OXYCODONE HYDROCHLORIDE 5 MG/1
5 TABLET ORAL ONCE
Refills: 0 | Status: DISCONTINUED | OUTPATIENT
Start: 2022-05-31 | End: 2022-05-31

## 2022-05-31 RX ORDER — KETOROLAC TROMETHAMINE 30 MG/ML
15 SYRINGE (ML) INJECTION ONCE
Refills: 0 | Status: DISCONTINUED | OUTPATIENT
Start: 2022-05-31 | End: 2022-05-31

## 2022-05-31 RX ADMIN — Medication 15 MILLIGRAM(S): at 17:15

## 2022-05-31 RX ADMIN — Medication 15 MILLIGRAM(S): at 22:21

## 2022-05-31 RX ADMIN — Medication 15 MILLIGRAM(S): at 14:13

## 2022-05-31 RX ADMIN — OXYCODONE HYDROCHLORIDE 5 MILLIGRAM(S): 5 TABLET ORAL at 19:29

## 2022-05-31 RX ADMIN — Medication 400 MILLIGRAM(S): at 17:14

## 2022-05-31 RX ADMIN — ONDANSETRON 4 MILLIGRAM(S): 8 TABLET, FILM COATED ORAL at 14:14

## 2022-05-31 NOTE — CONSULT NOTE ADULT - ASSESSMENT
26yo  LMP  presenting with persistent RLQ pain s/p dx LSC 22 for similar pain without resolution. Per brief operative note, patient with no discrete cysts, normal appearing bilateral tubes and ovaries, and uterus, nonconcerning for ovarian cysts or torsion. Today, imaging similarly without any gynecologic pathology findings.   - rec abd us to r/o appendicitis, ct scan to r/o postoperative complications that may contribute tobgyno pain  - in setting of normal imaging and normal diagnostic laparoscopy less than 5 days ago, very unlikely to be gyn in origin  - will follow up CT scan, would recommend surgery eval if continued pain    d/w Dr. Lona Posadas PGY-3 
ASSESSMENT/RECOMMENDATIONS: 27y F PMH IBS, asthma, PCOS, ovarian torsion, multiple R ovarian cystectomies, recent diag lap (05/26/22) for RLQ pain presents with recurrent RLQ pain. Mild tachycardia resolved, Labs wnl. CT A/P without acute abdominal pathology though large stool burden noted. Low suspicion for acute appendicitis.     - No indication for acute surgical intervention  - Recommend trial of enema/suppository  - Appreciate GYN recs  - Dispo per ED      Gi Oro MD  PGY2 Consult Resident  ACS/Trauma Surgery  [id number]

## 2022-05-31 NOTE — ED ADULT NURSE NOTE - CAS EDN DISCHARGE ASSESSMENT
Pt would like nurse to call her back. In regard to her prescriptions.  **did not leave any more information** Alert and oriented to person, place and time

## 2022-05-31 NOTE — ED PROVIDER NOTE - PATIENT PORTAL LINK FT
You can access the FollowMyHealth Patient Portal offered by [facility] by registering at the following website: [email or web address]. By joining [employer]’s FollowMyHealth portal, you will also be able to view your health information using other applications (apps) compatible with our system.

## 2022-05-31 NOTE — CONSULT NOTE ADULT - SUBJECTIVE AND OBJECTIVE BOX
GENERAL SURGERY CONSULT NOTE  HPI: 27y F PMH IBS, asthma, PCOS, ovarian torsion, multiple R ovarian cystectomies, recent diag lap (22) for RLQ pain presents with recurrent RLQ pain. Patient says her pain initially was controlled with PO pain meds post-op. However, RLQ became severe again yesterday. States she is passing gas today and had a normal BM on yesterday.    In terms of pain, states she has never had a pain like this prior to last week. Felt as though it began RLQ, however now feels it more radiating out to the L hand side. Reports constipation over past few weeks. Denies fevers, chills, chest pain, SOB, abnormal discharge, vaginal bleeding. LMP 5/13.     In ED, AF, Tachycardic to 116. Labs wnl. CT A/P without acute abdominal pathology, although appendix not visualized. General surgery consulted for further recommendations in setting of continued pain.     PAST MEDICAL & SURGICAL HISTORY:  Ovarian cyst      Ovarian torsion      IUD (intrauterine device) in place      Glomerulonephritis, streptococcal, acute      Anxiety and depression      Hirsutism      Migraine      COVID-19      Ovarian torsion      History of tonsillectomy            Allergies    eggs (Diarrhea)  fish (Hives)  iodine (Hives)  Seafood (Unknown)  shellfish (Anaphylaxis)    Intolerances        SOCIAL HISTORY: Denies tobacco, EtOH, other drug use      Physical Exam:  General: NAD, resting comfortably  HEENT: NC/AT, EOMI, normal hearing, no oral lesions, no LAD, neck supple  Pulmonary: normal resp effort on RA  Cardiovascular: RRR  Abdominal: soft, ND, tender to palpation in RLQ. Well healed lap port sites   Extremities: WWP, normal strength, no clubbing/cyanosis/edema  Neuro: A/O x 3, CNs II-XII grossly intact  Pulses: palpable distal pulses    Vital Signs Last 24 Hrs  T(C): 36.8 (31 May 2022 20:15), Max: 36.8 (31 May 2022 20:15)  T(F): 98.2 (31 May 2022 20:15), Max: 98.2 (31 May 2022 20:15)  HR: 98 (31 May 2022 20:15) (98 - 116)  BP: 96/68 (31 May 2022 20:15) (96/68 - 109/74)  BP(mean): 77 (31 May 2022 20:15) (77 - 77)  RR: 20 (31 May 2022 20:15) (20 - 22)  SpO2: 100% (31 May 2022 20:15) (99% - 100%)    I&O's Summary          LABS:                        12.0   10.86 )-----------( 374      ( 31 May 2022 14:18 )             38.1         138  |  104  |  9   ----------------------------<  91  3.8   |  21<L>  |  0.62    Ca    9.0      31 May 2022 14:18    TPro  7.3  /  Alb  4.5  /  TBili  0.5  /  DBili  x   /  AST  17  /  ALT  27  /  AlkPhos  88      PT/INR - ( 31 May 2022 14:18 )   PT: 12.9 sec;   INR: 1.11 ratio         PTT - ( 31 May 2022 14:18 )  PTT:33.9 sec  Urinalysis Basic - ( 31 May 2022 19:36 )    Color: Light Yellow / Appearance: Clear / S.020 / pH: x  Gluc: x / Ketone: Negative  / Bili: Negative / Urobili: Negative   Blood: x / Protein: Negative / Nitrite: Negative   Leuk Esterase: Negative / RBC: 1 /hpf / WBC 2 /HPF   Sq Epi: x / Non Sq Epi: 3 /hpf / Bacteria: Negative      CAPILLARY BLOOD GLUCOSE        LIVER FUNCTIONS - ( 31 May 2022 14:18 )  Alb: 4.5 g/dL / Pro: 7.3 g/dL / ALK PHOS: 88 U/L / ALT: 27 U/L / AST: 17 U/L / GGT: x             Cultures:      RADIOLOGY & ADDITIONAL STUDIES:  < from: CT Abdomen and Pelvis w/ Oral Cont (22 @ 18:20) >  FINDINGS:  CHEST:  LUNGS AND LARGE AIRWAYS: Patent central airways. A right lower lobe   nodule measures less than 3 mm (3:38). Otherwise, clear lungs.  PLEURA: No pleural effusion.  VESSELS: Within normal limits.  HEART: Heart size is normal. No pericardial effusion.  MEDIASTINUM AND KAILYN: No lymphadenopathy.  CHEST WALL AND LOWER NECK: Within normal limits.    Evaluation of the visceral organs and vasculature is limited without the   use of intravenous contrast.    ABDOMEN AND PELVIS:  LIVER: Within normal limits.  BILE DUCTS: Normal caliber.  GALLBLADDER: Within normal limits.  SPLEEN: Within normal limits.  PANCREAS: Within normal limits.  ADRENALS: Within normal limits.  KIDNEYS/URETERS: Within normal limits.    BLADDER: Within normal limits.  REPRODUCTIVE ORGANS: Uterus andadnexa within normal limits.    BOWEL: No bowel obstruction. Appendix is not visualized. No evidence of   inflammation in the pericecal region.  PERITONEUM: Trace free pelvic fluid. There are foci of postsurgical   properitoneal and retroperitoneal free air throughout the abdomen and   pelvis, which may be seen in the recent postoperative setting.  VESSELS: Within normal limits.  RETROPERITONEUM/LYMPH NODES: No lymphadenopathy.  ABDOMINAL WALL: Anterior abdominal wall postoperative changes. Tiny   fat-containing umbilical hernia.  BONES: Within normal limits.    IMPRESSION:    Postoperative changes as described above.    Otherwise, no acute intrathoracic or intra-abdominal pathology on this   CT. No collection. No hematoma.    < end of copied text >   GENERAL SURGERY CONSULT NOTE  HPI: 27y F PMH IBS, asthma, PCOS, ovarian torsion, multiple R ovarian cystectomies, recent diag lap (22) for R pelvic pain presents with recurrent R pelvic pain. Patient says her pain initially was controlled with PO pain meds post-op. However, RLQ became severe again yesterday. States she is passing gas today and had a normal BM on yesterday.    In terms of pain, states she has never had a pain like this prior to last week. Felt as though it began RLQ, however now feels it more radiating out to the L hand side. Reports constipation over past few weeks. Denies fevers, chills, chest pain, SOB, abnormal discharge, vaginal bleeding. LMP 5/13.     In ED, AF, Tachycardic to 116. Labs wnl. CT A/P without acute abdominal pathology, although appendix not visualized. General surgery consulted for further recommendations in setting of continued pain.     PAST MEDICAL & SURGICAL HISTORY:  Ovarian cyst      Ovarian torsion      IUD (intrauterine device) in place      Glomerulonephritis, streptococcal, acute      Anxiety and depression      Hirsutism      Migraine      COVID-19      Ovarian torsion      History of tonsillectomy            Allergies    eggs (Diarrhea)  fish (Hives)  iodine (Hives)  Seafood (Unknown)  shellfish (Anaphylaxis)    Intolerances        SOCIAL HISTORY: Denies tobacco, EtOH, other drug use      Physical Exam:  General: NAD, resting comfortably  HEENT: NC/AT, EOMI, normal hearing, no oral lesions, no LAD, neck supple  Pulmonary: normal resp effort on RA  Cardiovascular: RRR  Abdominal: soft, ND, tender to palpation in RLQ. Well healed lap port sites   Extremities: WWP, normal strength, no clubbing/cyanosis/edema  Neuro: A/O x 3, CNs II-XII grossly intact  Pulses: palpable distal pulses    Vital Signs Last 24 Hrs  T(C): 36.8 (31 May 2022 20:15), Max: 36.8 (31 May 2022 20:15)  T(F): 98.2 (31 May 2022 20:15), Max: 98.2 (31 May 2022 20:15)  HR: 98 (31 May 2022 20:15) (98 - 116)  BP: 96/68 (31 May 2022 20:15) (96/68 - 109/74)  BP(mean): 77 (31 May 2022 20:15) (77 - 77)  RR: 20 (31 May 2022 20:15) (20 - 22)  SpO2: 100% (31 May 2022 20:15) (99% - 100%)    I&O's Summary          LABS:                        12.0   10.86 )-----------( 374      ( 31 May 2022 14:18 )             38.1         138  |  104  |  9   ----------------------------<  91  3.8   |  21<L>  |  0.62    Ca    9.0      31 May 2022 14:18    TPro  7.3  /  Alb  4.5  /  TBili  0.5  /  DBili  x   /  AST  17  /  ALT  27  /  AlkPhos  88      PT/INR - ( 31 May 2022 14:18 )   PT: 12.9 sec;   INR: 1.11 ratio         PTT - ( 31 May 2022 14:18 )  PTT:33.9 sec  Urinalysis Basic - ( 31 May 2022 19:36 )    Color: Light Yellow / Appearance: Clear / S.020 / pH: x  Gluc: x / Ketone: Negative  / Bili: Negative / Urobili: Negative   Blood: x / Protein: Negative / Nitrite: Negative   Leuk Esterase: Negative / RBC: 1 /hpf / WBC 2 /HPF   Sq Epi: x / Non Sq Epi: 3 /hpf / Bacteria: Negative      CAPILLARY BLOOD GLUCOSE        LIVER FUNCTIONS - ( 31 May 2022 14:18 )  Alb: 4.5 g/dL / Pro: 7.3 g/dL / ALK PHOS: 88 U/L / ALT: 27 U/L / AST: 17 U/L / GGT: x             Cultures:      RADIOLOGY & ADDITIONAL STUDIES:  < from: CT Abdomen and Pelvis w/ Oral Cont (22 @ 18:20) >  FINDINGS:  CHEST:  LUNGS AND LARGE AIRWAYS: Patent central airways. A right lower lobe   nodule measures less than 3 mm (3:38). Otherwise, clear lungs.  PLEURA: No pleural effusion.  VESSELS: Within normal limits.  HEART: Heart size is normal. No pericardial effusion.  MEDIASTINUM AND KAILYN: No lymphadenopathy.  CHEST WALL AND LOWER NECK: Within normal limits.    Evaluation of the visceral organs and vasculature is limited without the   use of intravenous contrast.    ABDOMEN AND PELVIS:  LIVER: Within normal limits.  BILE DUCTS: Normal caliber.  GALLBLADDER: Within normal limits.  SPLEEN: Within normal limits.  PANCREAS: Within normal limits.  ADRENALS: Within normal limits.  KIDNEYS/URETERS: Within normal limits.    BLADDER: Within normal limits.  REPRODUCTIVE ORGANS: Uterus andadnexa within normal limits.    BOWEL: No bowel obstruction. Appendix is not visualized. No evidence of   inflammation in the pericecal region.  PERITONEUM: Trace free pelvic fluid. There are foci of postsurgical   properitoneal and retroperitoneal free air throughout the abdomen and   pelvis, which may be seen in the recent postoperative setting.  VESSELS: Within normal limits.  RETROPERITONEUM/LYMPH NODES: No lymphadenopathy.  ABDOMINAL WALL: Anterior abdominal wall postoperative changes. Tiny   fat-containing umbilical hernia.  BONES: Within normal limits.    IMPRESSION:    Postoperative changes as described above.    Otherwise, no acute intrathoracic or intra-abdominal pathology on this   CT. No collection. No hematoma.    < end of copied text >    < from: US Appendix (US Appendix .) (22 @ 15:50) >  Focused ultrasound examination fails to identify the appendix. No fluid   collection or masses seen.    IMPRESSION: Appendix is not visualized.    < end of copied text >

## 2022-05-31 NOTE — ED ADULT TRIAGE NOTE - CHIEF COMPLAINT QUOTE
pelvic pain; in hospital for last week for same issue; did laparoscopic surgery and no dx; hx of ovarian torsion as a kid

## 2022-05-31 NOTE — ED PROVIDER NOTE - NS ED ROS FT
CONSTITUTIONAL: No fever, weight loss, or fatigue  EYES: No eye pain, visual disturbances, or discharge  ENMT:  No difficulty hearing, tinnitus, vertigo; No sinus or throat pain  NECK: No pain or stiffness  RESPIRATORY: No cough, wheezing, chills or hemoptysis; No shortness of breath  CARDIOVASCULAR: + chest pain, no palpitations, dizziness, or leg swelling  GASTROINTESTINAL: + abdominal and pelvic pain. + nausea, no vomiting, or hematemesis; No diarrhea or constipation. No melena or hematochezia.  GENITOURINARY: No dysuria, frequency, hematuria, or incontinence  NEUROLOGICAL: No headaches, memory loss, loss of strength, numbness, or tremors  SKIN: No itching, burning, rashes, or lesions

## 2022-05-31 NOTE — ED PROVIDER NOTE - PROGRESS NOTE DETAILS
Attd:  Received sign out on patient.  Labs/imaging non actionable.  Seen by OBGYN and no further intervention indicated from their standpoint.  Evaluated by general surgery, also no surgical intervention, recommending possible enema for symptoms.  Will administer enema in Emergency Department. Dr. Lynch's note: At the beginning of my shift, i took over care of the patient from outgoing physician with plan to reassess after enema. pt had good amount of bowel movement. pt is now asking to be discharged. Reviewing chart, multiple diagnoses were considered during patient's evaluation today. While exact etiology of symptoms is unclear, there appears to be no emergent process today that would require further emergent or inpatient management. Pt is safe for dc with outpt f/u and return instructions if symptoms worsen. [id number]: pt received at sign-out, seen and evaluated at bedside.  Pt expressing frustration that she has been in ED for hours, seen by multiple providers and consultants. Pt reports mild improvement after enema. This was a difficult patient encounter.  The patient’s expectations as to management were not able to be met given the guidelines by the state, hospital guidelines, and patient’s personal care guidelines.  I discussed at length with the patient my concerns and offered treatment options that were in keeping with our policies and procedures.  Unfortunately, this did not alleviate the patient’s concerns.  At all times myself and staff were respectful of the patient, attempted to solve their issues within our constraints, and treated the patient with all due courtesy.  Unfortunately, the patient left disappointed as to their expectations.  I encouraged them to follow up with their OBGYN for further treatment and to return to the ED if they had any other symptoms or concerns. [id number]: pt received at sign-out, seen and evaluated at bedside.  Pt with non-actionable labs/imaging. Pt expressing frustration that she has been in ED for hours, seen by multiple providers and consultants, requesting discharge. Pt reports mild improvement after enema. This was a difficult patient encounter. The patient’s expectations as to management were not able to be met given the guidelines by the state, hospital guidelines, and patient’s personal care guidelines.  I discussed at length with the patient my concerns and offered treatment options.  Unfortunately, this did not alleviate the patient’s concerns.  At all times myself and staff were respectful of the patient, attempted to solve their issues within our constraints, and treated the patient with all due courtesy.  Unfortunately, the patient left disappointed as to their expectations.  I encouraged them to follow up with their OBGYN for further treatment and to return to the ED if they had any other symptoms or concerns.

## 2022-05-31 NOTE — ED ADULT NURSE NOTE - OBJECTIVE STATEMENT
1338 pt 27yf aox4 c/o abd pain, inc anxiety noted/crying during first intake, was seen at [facility] for same s/sx, sent home, had laparoscopic procedure at [facility] seen by another group of doctors, pt screaming during interview unable to tolerate exam, states inc pain noted, pt vss able to verbalize concerns, vss pending eval

## 2022-05-31 NOTE — ED ADULT NURSE NOTE - COVID-19  TEST TYPE
[facility]    [address] [address]    Phone:  [phone]           Adriel Kerns   MRN: [id number]    Department:  [facility]   Date of Visit:  2/24/2017           Diagnosis     Acute bronchitis, unspecified organism        You were seen by Robbie Randhawa MD.      Disclaimer     Follow-up Care:  It is your responsibility to arrange for follow-up care with your healthcare provider or as instructed. Call to get an appointment time.           Contact your doctor for follow-up appointment if not already scheduled.     Follow up with Octavio Barreto MD In 1 week.    Specialty:  Internal Medicine    Comments:  As needed, If symptoms worsen    Contact information    Critical access hospital[address] [place] [id number] [phone]        Preventive care and screening     Your blood pressure was 149/69 today. If your blood pressure is higher than 120/80, we recommend follow up with your primary care provider to obtain basic health screening, including reassessment of your blood pressure, within three months.          Medications you received while in the ED through 02/24/2017  3:10 PM     Date/Time Order Dose Route Action    02/24/2017  3:00 PM albuterol-ipratropium 2.5 mg/0.5 mg (DUONEB) nebulizer solution 3 mL 3 mL Nebulization Given         What to Do with Your Medications      START taking these medications today unless otherwise stated        Details    albuterol 108 (90 BASE) MCG/ACT inhaler        Inhale 2 puffs into the lungs every 4 hours as needed for Wheezing.   Authorizing Provider:  Robbie Randhawa       azithromycin 250 MG tablet   Commonly known as:  ZITHROMAX Z-COSTA        Take 2 tablets (500 mg) by mouth x 1 day then 1 tablet (250 mg) by mouth daily x 4 days.   Authorizing Provider:  Robbie Randhawa       predniSONE 20 MG tablet   Commonly known as:  DELTASONE        Take 2 tablets by mouth daily.   Authorizing Provider:  Robbie Randhawa                           Where to Get Your Medications      You can get these medications from any pharmacy     Bring a paper prescription for each of these medications     albuterol 108 (90 BASE) MCG/ACT inhaler    azithromycin 250 MG tablet    predniSONE 20 MG tablet               Your To Do List     Future Appointments Provider Department Dept Phone    3/29/2017 9:10 AM [facility] [phone]    5/24/2017 10:30 AM [facility] [phone]    1.  On the day of your exam wear loose, comfortable clothes that do not have any metal.  You may be asked to change into a hospital gown.  2.  Please arrive 15 minutes before your scheduled appointment time.    3.  Please do not bring children to your appointment unless you have someone to stay with them in the waiting area.  For safety reasons, children are not allowed in the CT procedure room and should not be left in the waiting room unattended.  If your exam was ordered with oral contrast: 1.  If your doctor gave you oral contrast or you picked it up from the Radiology/Imaging Department you may  start preparing for your test at home.  Follow the instructions that were given to you with the oral contrast.  If you are able to finish drinking the contrast in the recommended time frame you may arrive 15 minutes before your scheduled appointment time.   2.  If you were not given or did not  oral contrast, please arrive 90 minutes before your scheduled appointment time.  The extra time is needed to allow time for you to drink the contrast and for it to pass through your system.    If you have been told not to eat before your exam, follow the instructions below:  1.  If you have been told that you need blood tests to check your kidney function, you should have the lab test performed at least 1 day before scheduled date of the exam.     2.  Do not eat solid foods for 3 hours prior to your CT appointment.  You may continue to drink  clear liquids (black coffee/tea, broth, water or juice) up until the time of your scan.  3.  Take your regular medications, except insulin, at your usual time.  See Diabetic Patient section if you take  insulin.   4.  When IV contrast is used, there is a risk for allergic reaction to the dye.  If you have an allergy to IV contrast, either the Radiology Department will contact you or if you have a mild allergy to the IV contrast your physician may prescribe a medication to lessen the chance of a reaction occurring.  Diabetic Patients: 1. If you take insulin, contact your physician for instructions on the dose of insulin you need to take before the CT scan.   Inform your physician that you cannot eat any solid food for 3 hours prior to the scan.  2. If you use an insulin infusion pump you may be asked to remove the pump depending on the type of exam you are having.  Please bring your insulin pump supplies with you to your appointment in case it must be removed for the test.  Patients Taking Metformin-Containing Medications: Some patients that take metformin and receive an injection of contrast for their imaging test, may be asked not to take Metformin (Glucophage®), Glucovance, Avandamet, Metaglip, Fortamet, Riomet or any other metformin-containing medication for 2 to 3 days after their CT exam.  Your technologist  will give you specific follow up instructions about your metformin-containing medication  on the day of your scan.  All other medications can be taken as normal.   Patients Instructed to Stop Taking Metformin-Containing Medications After Their CT Exam: If you are asked to stop taking your metformin-containing medication, you must have another blood test, performed 48-72 hours after the exam, to check your kidney function before you can start taking metformin again. Do not start taking metformin until your doctor has instructed you to do so.  If you are concerned about stopping your medication contact your  physician.    Female Patients of Childbearing Age: 1. If you are, or think you may be pregnant, discuss this with your physician before your exam.  At the time of your exam tell the technologist that you are, or may be pregnant.  2.  If contrast is used for your CT test and  you are breastfeeding, American College of Radiology (ACR) guidelines state that contrast administration to the mother is considered safe for both the baby and nursing mother.  If you choose to stop breastfeeding, express and discard breast milk for 24 hours.    5/25/2017 4:00 PM Chris Roberts MD [facility] [phone]    6/28/2017 9:00 AM DC[facility] LAB [facility] [place] [facility] [phone]    7/5/2017 9:00 AM Octavio Barreto MD [facility] [place] [facility] [phone]      Procedures     None      Imaging Results     None        Discharge Instructions         Bronchitis, Antibiotic Treatment (Adult)    Bronchitis is an infection of the air passages (bronchial tubes) in your lungs. It often occurs when you have a cold. This illness is contagious during the first few days and is spread through the air by coughing and sneezing, or by direct contact (touching the sick person and then touching your own eyes, nose, or mouth).  Symptoms of bronchitis include cough with mucus (phlegm) and low-grade fever. Bronchitis usually lasts 7 to 14 days. Mild cases can be treated with simple home remedies. More severe infection is treated with an antibiotic.  Home care  Follow these guidelines when caring for yourself at home:  · If your symptoms are severe, rest at home for the first 2 to 3 days. When you go back to your usual activities, don't let yourself get too tired.  · Do not smoke. Also avoid being exposed to secondhand smoke.  · You may use over-the-counter medicines to control fever or pain, unless another medicine was prescribed. (Note: If you have chronic liver or kidney disease or have ever had a stomach ulcer or gastrointestinal  bleeding, talk with your healthcare provider before using these medicines. Also talk to your provider if you are taking medicine to prevent blood clots.) Aspirin should never be given to anyone younger than 18 years of age who is ill with a viral infection or fever. It may cause severe liver or brain damage.  · Your appetite may be poor, so a light diet is fine. Avoid dehydration by drinking 6 to 8 glasses of fluids per day (such as water, soft drinks, sports drinks, juices, tea, or soup). Extra fluids will help loosen secretions in the nose and lungs.  · Over-the-counter cough, cold, and sore-throat medicines will not shorten the length of the illness, but they may be helpful to reduce symptoms. (Note: Do not use decongestants if you have high blood pressure.)  · Finish all antibiotic medicine. Do this even if you are feeling better after only a few days.  Follow-up care  Follow up with your healthcare provider, or as advised. If you had an X-ray or ECG (electrocardiogram), a specialist will review it. You will be notified of any new findings that may affect your care.  Note: If you are age 65 or older, or if you have a chronic lung disease or condition that affects your immune system, or you smoke, talk to your healthcare provider about having pneumococcal vaccinations and a yearly influenza vaccination (flu shot).  When to seek medical advice  Call your healthcare provider right away if any of these occur:  · Fever of 100.4°F (38°C) or higher  · Coughing up increased amounts of colored sputum  · Weakness, drowsiness, headache, facial pain, ear pain, or a stiff neck   Call 911, or get immediate medical care  Contact emergency services right away if any of these occur.  · Coughing up blood  · Worsening weakness, drowsiness, headache, or stiff neck  · Trouble breathing, wheezing, or pain with breathing  © 5456-0329 [email or web address]. [address] [place] [address], [place] [address]. All rights reserved. This  information is not intended as a substitute for professional medical care. Always follow your healthcare professional's instructions.          Discharge References/Attachments     None      Medication Safety: What you need to know     Maintain Security - It is important to keep all medications in a secure location:  Keep out of the reach of children and pets    Consider using a lock box or locked filing cabinet    Place pill bottles in private area such as bedroom or drawer    Don't Share - It is illegal to share your prescription medication, even with family:  The doctor prescribes medications specifically for you and your body    You cannot be sure how the drug may affect others physically or emotionally    It is a criminal offense to share prescriptions    Proper Disposal - It is no longer acceptable to flush or throw away medications:  Recent studies show measurable amounts of medication have been found in drinking water and wildlife due to flushing or throwing away medications    Medication strength changes over time and is not typically safe after one year    Proper disposal removes the medication from your home in a safe way so that others don't have access to it. Use your local drug drop site.    Your local pharmacy can provide information on medication disposal options in your community. The Department of Justice Drug Enforcement Administration website also has information on safe medication disposal:  www.deadiversion.usdoj.gov/drug_disposal/index.html         MOLECULAR PCR

## 2022-05-31 NOTE — ED PROVIDER NOTE - PHYSICAL EXAMINATION
GENERAL: NAD  HEENT:  Atraumatic  CHEST/LUNG: Chest rise equal bilaterally  HEART: Regular rate and rhythm  ABDOMEN: Soft, +LLQ and RLQ tenderness. Nondistended  EXTREMITIES:  Extremities warm  PSYCH: A&Ox3  SKIN: No obvious rashes or lesions  NEUROLOGY: Ambulatory without difficulty. GENERAL: NAD  HEENT:  Atraumatic  CHEST/LUNG: Chest rise equal bilaterally  HEART: Regular rate and rhythm  ABDOMEN: Soft, +LLQ and RLQ tenderness. Nondistended  EXTREMITIES:  Extremities warm  PSYCH: A&Ox3  SKIN: No obvious rashes or lesions  NEUROLOGY: Ambulatory without difficulty.  - Chaperone Alia Shea RN. No bleeding on pad. External labia wnl. Speculum exam with no vaginal lesions, minimal amount of blood visualized. Bimanual exam with right-sided cervical motion tenderness, uterus wnl, adnexa non palpable b/l.

## 2022-05-31 NOTE — ED PROVIDER NOTE - NSFOLLOWUPINSTRUCTIONS_ED_ALL_ED_FT
Please follow up with your primary care doctor within 1 week.  Please follow up with OBGYN within 1 week    Continue all home medications as prescribed    Take acetaminophen 500-1000mg every 6 hrs as needed for pain. DO NOT EXCEED 4000mg DAILY.     Return to the ED for worsening pain, nausea, vomiting, fevers, or any other concerns. While we do not know the exact cause of your pain, you do not seem to have any dangerous condition contributing to the pain.    Please follow up with your primary care doctor within 1 week.  Please follow up with OBGYN within 1 week    Continue all home medications as prescribed    Take acetaminophen 500-1000mg every 6 hrs as needed for pain. DO NOT EXCEED 4000mg DAILY.     Return to the ED for worsening pain, nausea, vomiting, fevers, or any other concerns.

## 2022-05-31 NOTE — CONSULT NOTE ADULT - SUBJECTIVE AND OBJECTIVE BOX
GYN CONSULT     HPI: 27yoF LMP 5/13 presenting with RLQ pain after diagnostic laparoscopy on 5/26 with unexplained RLQ pain in setting of h/o ovarian torsion in 2009, PCOS, and multiple cystectomies. After discharge, patient reports mild relief, however received oxycodone from a different provider on Friday/Saturday and has been taking that for "severe pain" episodes. Patient reportedly has an appointment with Dr. Zamorano (her GYN)  tomorrow morning. States she is passing gas and had a normal BM on Sunday.     In terms of pain, states she has never had a pain like this prior to last week. Felt as though it began RLQ, however now feels it more radiating out to the L hand side. Reports constipation over past few weeks. Denies abnormal discharge, vaginal bleeding.       Name of GYN Physician:   POB: G0   PGyn: ovarian torsion 2009 - dx lsc and de torsion, cystectomy [id number], D&C 2020    Home meds: Imodium     Allergies  eggs (Diarrhea)  fish (Hives)  iodine (Hives)  Seafood (Unknown)  shellfish (Anaphylaxis)    PAST MEDICAL & SURGICAL HISTORY:  Ovarian torsion s/p surgical detorsion with no removal of organs   IUD (intrauterine device) in place  Glomerulonephritis, streptococcal, acute  Anxiety and depression  Hirsutism  Migraine  COVID-19  Ovarian torsion  History of tonsillectomy      REVIEW OF SYSTEMS  General: denies fevers, chills, tiredness  Skin/Breast: denies breast pain  Respiratory and Thorax: denies shortness of breath, denies cough  Cardiovascular: denies chest pain and denies palpitations  Gastrointestinal: denies abdominal pain, nausea/ vomiting	  Genitourinary: denies dysuria, increased urinary frequency, urgency	  Constitutional, Cardiovascular, Respiratory, Gastrointestinal, Genitourinary, Musculoskeletal and Integumentary review of systems are normal except as noted. 	    Vital Signs Last 24 Hrs  T(C): 36.7 (31 May 2022 12:39), Max: 36.7 (31 May 2022 12:39)  T(F): 98.1 (31 May 2022 12:39), Max: 98.1 (31 May 2022 12:39)  HR: 116 (31 May 2022 12:39) (116 - 116)  BP: 109/74 (31 May 2022 12:39) (109/74 - 109/74)  BP(mean): --  RR: 22 (31 May 2022 12:39) (22 - 22)  SpO2: 99% (31 May 2022 12:39) (99% - 99%)    PHYSICAL EXAM:   Gen: NAD, alert and oriented x 3  Cardiovascular: regular   Respiratory: breathing comfortably on RA  Abd: soft, tenderness with deep palpation on R, no rebound or guarding, +psoas sign   Pelvic: closed/long, no CMT, Uterus: normal size, non tender  Adnexa: non tender, no palpable masses  Extremities: NTBL  Skin: warm and well perfused      LABS:                        12.0   10.86 )-----------( 374      ( 31 May 2022 14:18 )             38.1     05-31    138  |  104  |  9   ----------------------------<  91  3.8   |  21<L>  |  0.62    Ca    9.0      31 May 2022 14:18    TPro  7.3  /  Alb  4.5  /  TBili  0.5  /  DBili  x   /  AST  17  /  ALT  27  /  AlkPhos  88  05-31    PT/INR - ( 31 May 2022 14:18 )   PT: 12.9 sec;   INR: 1.11 ratio         PTT - ( 31 May 2022 14:18 )  PTT:33.9 sec      RADIOLOGY & ADDITIONAL STUDIES:  < from: US Transvaginal (05.31.22 @ 15:50) >  FINDINGS:  Uterus: Retroverted, measuring 6.3 x 3.1 x 3.9 cm  Endometrium: 6 mm. Within normal limits.    Right ovary: 3.1 x 2.4 x 1.8 cm. Multiple small follicles are seen.  Left ovary: 3.7 x 2.3 x 1.6 cm. Multiple small follicles are seen. A   corpus luteum is again identified measuring 1.3 cm.    Duplex and color flow Doppler demonstrate arterial and venous flow in   both ovaries.    Fluid: A small amount of echogenic free fluid is noted. This is not   increased compared to prior study.    IMPRESSION:  No evidence of adnexal mass or collection. Small amount of free fluid,   not significantly changed.    No new abnormality seen.    --- End of Report ---      MORGAN PUGA MD; Attending Radiologist  This document has been electronically signed. May 31 2022  4:05PM    < end of copied text >

## 2022-05-31 NOTE — ED PROVIDER NOTE - CLINICAL SUMMARY MEDICAL DECISION MAKING FREE TEXT BOX
26 y/o F with PMH anxiety, IBS, ovarian cysts, PCOS, ovarian torsion, w/ PSH multiple ovarian cystectomies w/ recent ED visit last week and ex lap to r/o torsion last Thursday w/ unremarkable MR abdomen, TVUS presents to ED today c/o 2 day history of constant severe right pelvic pain w/ radiation to the left side. Concern for ovarian torsion given history of OBGYN surgery and ovarian torsion in the past. Will treat patient's pain, draw pre-op labs, and TVUS to screen for torsion. 26 y/o F with PMH anxiety, IBS, ovarian cysts, PCOS, ovarian torsion, w/ PSH multiple ovarian cystectomies w/ recent ED visit last week and ex lap to r/o torsion last Thursday w/ unremarkable MR abdomen, TVUS presents to ED today c/o 2 day history of constant severe right pelvic pain w/ radiation to the left side. Concern for ovarian torsion given history of OBGYN surgery and ovarian torsion in the past. Will treat patient's pain, draw pre-op labs, and TVUS to screen for torsion.    Issac: 27 year old female with pmhx of pcos, recent ER visit to [facility] where had exploratory laprascopic procedure to r/o torsion versus appendicitis, d/c'd home with no torsion found, here with 2 day history of right pelvic pain radiating to the left side, worsening today. concern for possible torsion. will get labs, pain control, tvus, obgyn consult, reassess

## 2022-06-01 VITALS
HEART RATE: 89 BPM | DIASTOLIC BLOOD PRESSURE: 71 MMHG | OXYGEN SATURATION: 100 % | TEMPERATURE: 98 F | SYSTOLIC BLOOD PRESSURE: 116 MMHG | RESPIRATION RATE: 18 BRPM

## 2022-06-01 LAB
CULTURE RESULTS: SIGNIFICANT CHANGE UP
SPECIMEN SOURCE: SIGNIFICANT CHANGE UP

## 2022-06-01 PROCEDURE — 93005 ELECTROCARDIOGRAM TRACING: CPT

## 2022-06-01 PROCEDURE — 85025 COMPLETE CBC W/AUTO DIFF WBC: CPT

## 2022-06-01 PROCEDURE — 96375 TX/PRO/DX INJ NEW DRUG ADDON: CPT

## 2022-06-01 PROCEDURE — 85610 PROTHROMBIN TIME: CPT

## 2022-06-01 PROCEDURE — 81025 URINE PREGNANCY TEST: CPT

## 2022-06-01 PROCEDURE — 85730 THROMBOPLASTIN TIME PARTIAL: CPT

## 2022-06-01 PROCEDURE — 76830 TRANSVAGINAL US NON-OB: CPT

## 2022-06-01 PROCEDURE — 84702 CHORIONIC GONADOTROPIN TEST: CPT

## 2022-06-01 PROCEDURE — 71250 CT THORAX DX C-: CPT | Mod: MA

## 2022-06-01 PROCEDURE — 96376 TX/PRO/DX INJ SAME DRUG ADON: CPT

## 2022-06-01 PROCEDURE — 81001 URINALYSIS AUTO W/SCOPE: CPT

## 2022-06-01 PROCEDURE — 86901 BLOOD TYPING SEROLOGIC RH(D): CPT

## 2022-06-01 PROCEDURE — 76705 ECHO EXAM OF ABDOMEN: CPT

## 2022-06-01 PROCEDURE — 93975 VASCULAR STUDY: CPT

## 2022-06-01 PROCEDURE — 86850 RBC ANTIBODY SCREEN: CPT

## 2022-06-01 PROCEDURE — 80053 COMPREHEN METABOLIC PANEL: CPT

## 2022-06-01 PROCEDURE — 87086 URINE CULTURE/COLONY COUNT: CPT

## 2022-06-01 PROCEDURE — 96374 THER/PROPH/DIAG INJ IV PUSH: CPT

## 2022-06-01 PROCEDURE — 74176 CT ABD & PELVIS W/O CONTRAST: CPT | Mod: MA

## 2022-06-01 PROCEDURE — 99285 EMERGENCY DEPT VISIT HI MDM: CPT | Mod: 25

## 2022-06-01 PROCEDURE — 74018 RADEX ABDOMEN 1 VIEW: CPT

## 2022-06-01 PROCEDURE — 86900 BLOOD TYPING SEROLOGIC ABO: CPT

## 2022-06-01 RX ADMIN — Medication 1 ENEMA: at 00:03

## 2022-06-02 ENCOUNTER — APPOINTMENT (OUTPATIENT)
Dept: INTERNAL MEDICINE | Facility: CLINIC | Age: 27
End: 2022-06-02
Payer: COMMERCIAL

## 2022-06-02 VITALS
BODY MASS INDEX: 27.46 KG/M2 | OXYGEN SATURATION: 98 % | TEMPERATURE: 97.8 F | HEART RATE: 104 BPM | HEIGHT: 63 IN | SYSTOLIC BLOOD PRESSURE: 114 MMHG | DIASTOLIC BLOOD PRESSURE: 79 MMHG | WEIGHT: 155 LBS

## 2022-06-02 DIAGNOSIS — R10.2 PELVIC AND PERINEAL PAIN: ICD-10-CM

## 2022-06-02 PROCEDURE — 99214 OFFICE O/P EST MOD 30 MIN: CPT

## 2022-06-02 NOTE — PLAN
[FreeTextEntry1] : Letter and paperwork complete. Pt advised to sign up for [facility] portal to review labs and communicate any questions or concerns directly. Yearly physical and return as needed for illness, medication refills, and new or existing complaints.

## 2022-06-02 NOTE — HISTORY OF PRESENT ILLNESS
[de-identified] : 27 year F with PMH pre-DM, exercise induced asthma, and IBS presents after ED visit for pevic pain, s/p exploratory surgery. She is requesting a letter and [place] paperwork. Symptoms are improving. Pt denies CP/SOB, fever/chills, n/v/d/c.

## 2022-06-08 ENCOUNTER — APPOINTMENT (OUTPATIENT)
Dept: GASTROENTEROLOGY | Facility: CLINIC | Age: 27
End: 2022-06-08
Payer: COMMERCIAL

## 2022-06-08 VITALS
TEMPERATURE: 98 F | BODY MASS INDEX: 26.93 KG/M2 | WEIGHT: 152 LBS | HEART RATE: 125 BPM | RESPIRATION RATE: 25 BRPM | OXYGEN SATURATION: 99 % | DIASTOLIC BLOOD PRESSURE: 80 MMHG | HEIGHT: 63 IN | SYSTOLIC BLOOD PRESSURE: 125 MMHG

## 2022-06-08 PROCEDURE — 99213 OFFICE O/P EST LOW 20 MIN: CPT

## 2022-06-08 NOTE — ASSESSMENT
[FreeTextEntry1] : MIKY ESTRADA 27 year F with PCOS, Pre diabetes and obesity here to establish care for IBS-M for  3 years, now S/P laparoscopy, CT/MRI of abdomen.\par \par 1. IBS-M\par - Patient's nocturnal symptoms indicate possible other pathology other than IBS.\par -I will proceed with an endoscopy and colonoscopy for biopsies.\par - Patient has not performed a fecal calprotectin, elastase and CRP  with TTG.\par \par 2. Abdominal pain\par - May be related to above.\par \par Follow up 2 weeks after procedure.

## 2022-06-08 NOTE — HISTORY OF PRESENT ILLNESS
[Heartburn] : denies heartburn [Nausea] : denies nausea [Vomiting] : denies vomiting [Yellow Skin Or Eyes (Jaundice)] : denies jaundice [Abdominal Swelling] : denies abdominal swelling [Rectal Pain] : denies rectal pain [Diarrhea] : diarrhea [Constipation] : constipation [Abdominal Pain] : abdominal pain [Irritable Bowel Syndrome] : irritable bowel syndrome [Wt Gain ___ Lbs] : no recent weight gain [Wt Loss ___ Lbs] : no recent weight loss [GERD] : no gastroesophageal reflux disease [Hiatus Hernia] : no hiatus hernia [Peptic Ulcer Disease] : no peptic ulcer disease [Pancreatitis] : no pancreatitis [Cholelithiasis] : no cholelithiasis [Kidney Stone] : no kidney stone [Inflammatory Bowel Disease] : no inflammatory bowel disease [Diverticulitis] : no diverticulitis [Alcohol Abuse] : no alcohol abuse [Malignancy] : no malignancy [Abdominal Surgery] : no abdominal surgery [Appendectomy] : no appendectomy [Cholecystectomy] : no cholecystectomy [de-identified] : MIKY ESTRADA 27 year F with PCOS, Pre diabetes and obesity here to establish care for IBS-M for  3 years.\par \par She is under the care VALDO Peters for 2 years from [id number].  Underwent a colonoscopy 2019 and an endoscopy x 2 in 2018/ 2019. Apparently biopsies were positive for H pylori and was eradicated after antibiotics.\par \par Alternating bowel habits but mostly constipation, followed by loose stools close to menstrual cycle. Patient was prescribed Imodium as she was up at night with diarrhea 8-10,. Stools were watery, no blood or mucus. LLQ crampy abdominal pain 6/10 relieved after bowel movements. \par \par Patient states of a good appetite, no loss of weight,. Denies nausea, vomiting, melena, hematochezia, or hematemesis. Patient is on ozempique 0.5 mg a week. \par \par FH: No 1st degree or 2nd degree relative with colon cancer, gastric cancer or pancreatic cancer.\par \par Office visit: 06/08/2022\par Patient was hospitalized twice in [place] for abdominal pain. A MRI abdomen, CT abdomen and USS appendix were normal. A diagnostic laparoscopy was performed without any diagnosis. Both surgery and Gyne consulted and all diagnosis was rule out. \par

## 2022-06-08 NOTE — CONSULT LETTER
[Dear  ___] : Dear  [unfilled], [Consult Letter:] : I had the pleasure of evaluating your patient, [unfilled]. [Please see my note below.] : Please see my note below. [Consult Closing:] : Thank you very much for allowing me to participate in the care of this patient.  If you have any questions, please do not hesitate to contact me. [Sincerely,] : Sincerely, [FreeTextEntry3] : Koffi Mcgee MD\par \par Assistant Clinical Professor \par Division of Gastroenterology at [facility]\par [facility]\par [facility]\par [facility]\par \par [address], [address], [place] [address]\par Tel: [phone] | Fax: [phone]\par \par Twitter (Personal): [email or web address] \par \par \par

## 2022-06-08 NOTE — PHYSICAL EXAM
[General Appearance - Alert] : alert [General Appearance - In No Acute Distress] : in no acute distress [Sclera] : the sclera and conjunctiva were normal [PERRL With Normal Accommodation] : pupils were equal in size, round, and reactive to light [Extraocular Movements] : extraocular movements were intact [Outer Ear] : the ears and nose were normal in appearance [Oropharynx] : the oropharynx was normal [Neck Appearance] : the appearance of the neck was normal [Neck Cervical Mass (___cm)] : no neck mass was observed [Jugular Venous Distention Increased] : there was no jugular-venous distention [Thyroid Diffuse Enlargement] : the thyroid was not enlarged [Thyroid Nodule] : there were no palpable thyroid nodules [Auscultation Breath Sounds / Voice Sounds] : lungs were clear to auscultation bilaterally [Heart Rate And Rhythm] : heart rate was normal and rhythm regular [Heart Sounds] : normal S1 and S2 [Heart Sounds Gallop] : no gallops [Murmurs] : no murmurs [Heart Sounds Pericardial Friction Rub] : no pericardial rub [Full Pulse] : the pedal pulses are present [Edema] : there was no peripheral edema [Cervical Lymph Nodes Enlarged Posterior Bilaterally] : posterior cervical [Cervical Lymph Nodes Enlarged Anterior Bilaterally] : anterior cervical [Supraclavicular Lymph Nodes Enlarged Bilaterally] : supraclavicular [Axillary Lymph Nodes Enlarged Bilaterally] : axillary [Femoral Lymph Nodes Enlarged Bilaterally] : femoral [Inguinal Lymph Nodes Enlarged Bilaterally] : inguinal [No CVA Tenderness] : no ~M costovertebral angle tenderness [No Spinal Tenderness] : no spinal tenderness [Abnormal Walk] : normal gait [Nail Clubbing] : no clubbing  or cyanosis of the fingernails [Musculoskeletal - Swelling] : no joint swelling seen [Motor Tone] : muscle strength and tone were normal [Skin Color & Pigmentation] : normal skin color and pigmentation [Skin Turgor] : normal skin turgor [] : no rash [Deep Tendon Reflexes (DTR)] : deep tendon reflexes were 2+ and symmetric [Sensation] : the sensory exam was normal to light touch and pinprick [No Focal Deficits] : no focal deficits [Oriented To Time, Place, And Person] : oriented to person, place, and time [Impaired Insight] : insight and judgment were intact [Affect] : the affect was normal [Normal] : normal [Soft, Nontender] : the abdomen was soft and nontender [No Mass] : no masses were palpated [No HSM] : no hepatosplenomegaly noted

## 2022-06-08 NOTE — ADDENDUM
[FreeTextEntry1] : The risks and benefits of my recommendations, as well as other treatment options were discussed with the patient today. Questions were answered.\par \par Please feel free to contact for any questions or concerns at my office  in the telephone numbers listed below.\par \par [address], [address], [place], [place], [address] Telephone: [phone] Fax: [phone]\par \par \par

## 2022-06-13 LAB — SARS-COV-2 N GENE NPH QL NAA+PROBE: NOT DETECTED

## 2022-06-16 ENCOUNTER — APPOINTMENT (OUTPATIENT)
Dept: GASTROENTEROLOGY | Facility: HOSPITAL | Age: 27
End: 2022-06-16

## 2022-06-16 ENCOUNTER — OUTPATIENT (OUTPATIENT)
Dept: OUTPATIENT SERVICES | Facility: HOSPITAL | Age: 27
LOS: 1 days | Discharge: HOME | End: 2022-06-16
Payer: COMMERCIAL

## 2022-06-16 ENCOUNTER — RESULT REVIEW (OUTPATIENT)
Age: 27
End: 2022-06-16

## 2022-06-16 ENCOUNTER — NON-APPOINTMENT (OUTPATIENT)
Age: 27
End: 2022-06-16

## 2022-06-16 VITALS
OXYGEN SATURATION: 99 % | SYSTOLIC BLOOD PRESSURE: 102 MMHG | HEART RATE: 85 BPM | RESPIRATION RATE: 20 BRPM | DIASTOLIC BLOOD PRESSURE: 63 MMHG

## 2022-06-16 VITALS — RESPIRATION RATE: 20 BRPM | HEART RATE: 69 BPM | DIASTOLIC BLOOD PRESSURE: 59 MMHG | SYSTOLIC BLOOD PRESSURE: 91 MMHG

## 2022-06-16 DIAGNOSIS — R10.9 UNSPECIFIED ABDOMINAL PAIN: ICD-10-CM

## 2022-06-16 DIAGNOSIS — N83.519 TORSION OF OVARY AND OVARIAN PEDICLE, UNSPECIFIED SIDE: Chronic | ICD-10-CM

## 2022-06-16 DIAGNOSIS — Z90.89 ACQUIRED ABSENCE OF OTHER ORGANS: Chronic | ICD-10-CM

## 2022-06-16 LAB — HCG UR QL: NEGATIVE — SIGNIFICANT CHANGE UP

## 2022-06-16 PROCEDURE — 44361 SMALL BOWEL ENDOSCOPY/BIOPSY: CPT

## 2022-06-16 PROCEDURE — 45380 COLONOSCOPY AND BIOPSY: CPT

## 2022-06-16 PROCEDURE — 88305 TISSUE EXAM BY PATHOLOGIST: CPT | Mod: 26

## 2022-06-16 NOTE — ASU PATIENT PROFILE, ADULT - FALL HARM RISK - UNIVERSAL INTERVENTIONS
Bed in lowest position, wheels locked, appropriate side rails in place/Call bell, personal items and telephone in reach/Instruct patient to call for assistance before getting out of bed or chair/Non-slip footwear when patient is out of bed/[place] to call system/Physically safe environment - no spills, clutter or unnecessary equipment/Purposeful Proactive Rounding/Room/bathroom lighting operational, light cord in reach

## 2022-06-20 ENCOUNTER — TRANSCRIPTION ENCOUNTER (OUTPATIENT)
Age: 27
End: 2022-06-20

## 2022-06-20 LAB — SURGICAL PATHOLOGY STUDY: SIGNIFICANT CHANGE UP

## 2022-06-28 ENCOUNTER — APPOINTMENT (OUTPATIENT)
Dept: GASTROENTEROLOGY | Facility: CLINIC | Age: 27
End: 2022-06-28
Payer: COMMERCIAL

## 2022-06-28 VITALS
WEIGHT: 159.38 LBS | DIASTOLIC BLOOD PRESSURE: 74 MMHG | OXYGEN SATURATION: 98 % | HEIGHT: 63 IN | HEART RATE: 104 BPM | SYSTOLIC BLOOD PRESSURE: 124 MMHG | BODY MASS INDEX: 28.24 KG/M2

## 2022-06-28 DIAGNOSIS — R10.9 UNSPECIFIED ABDOMINAL PAIN: ICD-10-CM

## 2022-06-28 PROCEDURE — 99214 OFFICE O/P EST MOD 30 MIN: CPT

## 2022-06-29 ENCOUNTER — TRANSCRIPTION ENCOUNTER (OUTPATIENT)
Age: 27
End: 2022-06-29

## 2022-06-29 PROBLEM — R10.9 ABDOMINAL CRAMPS: Status: ACTIVE | Noted: 2022-04-22

## 2022-06-29 NOTE — HISTORY OF PRESENT ILLNESS
[Mother] : mother [Heartburn] : denies heartburn [Nausea] : denies nausea [Vomiting] : denies vomiting [Yellow Skin Or Eyes (Jaundice)] : denies jaundice [Abdominal Swelling] : denies abdominal swelling [Rectal Pain] : denies rectal pain [Wt Gain ___ Lbs] : no recent weight gain [Wt Loss ___ Lbs] : no recent weight loss [Diarrhea] : diarrhea [Constipation] : constipation [Abdominal Pain] : abdominal pain [GERD] : no gastroesophageal reflux disease [Hiatus Hernia] : no hiatus hernia [Peptic Ulcer Disease] : no peptic ulcer disease [Pancreatitis] : no pancreatitis [Cholelithiasis] : no cholelithiasis [Kidney Stone] : no kidney stone [Inflammatory Bowel Disease] : no inflammatory bowel disease [Irritable Bowel Syndrome] : irritable bowel syndrome [Diverticulitis] : no diverticulitis [Alcohol Abuse] : no alcohol abuse [Malignancy] : no malignancy [Abdominal Surgery] : no abdominal surgery [Appendectomy] : no appendectomy [Cholecystectomy] : no cholecystectomy [de-identified] : MIKY ESTRADA 27 year F with PCOS, Pre diabetes and obesity here to establish care for IBS-M for  3 years.\par \par She is under the care VALDO Peters for 2 years from [id number].  Underwent a colonoscopy 2019 and an endoscopy x 2 in 2018/ 2019. Apparently biopsies were positive for H pylori and was eradicated after antibiotics.\par \par Alternating bowel habits but mostly constipation, followed by loose stools close to menstrual cycle. Patient was prescribed Imodium as she was up at night with diarrhea 8-10,. Stools were watery, no blood or mucus. LLQ crampy abdominal pain 6/10 relieved after bowel movements. \par \par Patient states of a good appetite, no loss of weight,. Denies nausea, vomiting, melena, hematochezia, or hematemesis. Patient is on ozempique 0.5 mg a week. \par \par FH: No 1st degree or 2nd degree relative with colon cancer, gastric cancer or pancreatic cancer.\par \par Office visit: 06/08/2022\par Patient was hospitalized twice in May for abdominal pain. A MRI abdomen, CT abdomen and USS appendix were normal. A diagnostic laparoscopy was performed without any diagnosis. Both surgery and Gyne consulted and all diagnosis were ruled out. \par Interval history: An endoscopy and a colonoscopy performed by myself showed HP gastritis (on addendum). \par Office visit: 06/28/2022\par Patient feels better. Abdominal pain subsided and she is back at work.

## 2022-06-29 NOTE — ADDENDUM
[FreeTextEntry1] : The risks and benefits of my recommendations, as well as other treatment options were discussed with the patient today. Questions were answered.\par \par Please feel free to contact for any questions or concerns at my office  in the telephone numbers listed below.\par \par [address], [address], [place], [place], [address] Telephone: [phone] Fax: [phone]\par \par \par

## 2022-06-29 NOTE — CONSULT LETTER
[Dear  ___] : Dear  [unfilled], [Consult Letter:] : I had the pleasure of evaluating your patient, [unfilled]. [Please see my note below.] : Please see my note below. [Consult Closing:] : Thank you very much for allowing me to participate in the care of this patient.  If you have any questions, please do not hesitate to contact me. [Sincerely,] : Sincerely, [FreeTextEntry3] : Koffi Mcgee MD\par \par Assistant Clinical Professor \par Division of Gastroenterology at [facility]\par [facility]\par [facility] at [facility]\par [facility]\par \par [address], [place] [address]\par Tel: [phone] | Fax: [phone]\par \par Twitter (Personal): [email or web address] \par \par \par

## 2022-07-06 ENCOUNTER — APPOINTMENT (OUTPATIENT)
Dept: INTERNAL MEDICINE | Facility: CLINIC | Age: 27
End: 2022-07-06

## 2022-07-06 ENCOUNTER — NON-APPOINTMENT (OUTPATIENT)
Age: 27
End: 2022-07-06

## 2022-07-06 VITALS
TEMPERATURE: 98 F | HEIGHT: 63 IN | OXYGEN SATURATION: 98 % | BODY MASS INDEX: 27.73 KG/M2 | DIASTOLIC BLOOD PRESSURE: 75 MMHG | RESPIRATION RATE: 16 BRPM | HEART RATE: 94 BPM | WEIGHT: 156.5 LBS | SYSTOLIC BLOOD PRESSURE: 109 MMHG

## 2022-07-06 DIAGNOSIS — G89.18 OTHER ACUTE POSTPROCEDURAL PAIN: ICD-10-CM

## 2022-07-06 DIAGNOSIS — R00.2 PALPITATIONS: ICD-10-CM

## 2022-07-06 DIAGNOSIS — R06.89 OTHER ABNORMALITIES OF BREATHING: ICD-10-CM

## 2022-07-06 DIAGNOSIS — Z00.00 ENCOUNTER FOR GENERAL ADULT MEDICAL EXAMINATION W/OUT ABNORMAL FINDINGS: ICD-10-CM

## 2022-07-06 DIAGNOSIS — K29.70 GASTRITIS, UNSPECIFIED, W/OUT BLEEDING: ICD-10-CM

## 2022-07-06 DIAGNOSIS — K58.9 IRRITABLE BOWEL SYNDROME W/OUT DIARRHEA: ICD-10-CM

## 2022-07-06 DIAGNOSIS — J45.990 EXERCISE INDUCED BRONCHOSPASM: ICD-10-CM

## 2022-07-06 DIAGNOSIS — E28.2 POLYCYSTIC OVARIAN SYNDROME: ICD-10-CM

## 2022-07-06 DIAGNOSIS — B96.81 GASTRITIS, UNSPECIFIED, W/OUT BLEEDING: ICD-10-CM

## 2022-07-06 PROCEDURE — 36415 COLL VENOUS BLD VENIPUNCTURE: CPT

## 2022-07-06 PROCEDURE — 93000 ELECTROCARDIOGRAM COMPLETE: CPT

## 2022-07-06 PROCEDURE — 99395 PREV VISIT EST AGE 18-39: CPT | Mod: 25

## 2022-07-06 RX ORDER — OXYCODONE AND ACETAMINOPHEN 5; 325 MG/1; MG/1
5-325 TABLET ORAL EVERY 8 HOURS
Qty: 21 | Refills: 0 | Status: DISCONTINUED | COMMUNITY
Start: 2022-05-27 | End: 2022-07-06

## 2022-07-06 RX ORDER — METFORMIN HYDROCHLORIDE 500 MG/1
500 TABLET, COATED ORAL 3 TIMES DAILY
Refills: 0 | Status: ACTIVE | COMMUNITY

## 2022-07-06 RX ORDER — ALBUTEROL SULFATE 90 UG/1
108 (90 BASE) INHALANT RESPIRATORY (INHALATION)
Qty: 1 | Refills: 2 | Status: ACTIVE | COMMUNITY
Start: 2021-01-20

## 2022-07-06 NOTE — HISTORY OF PRESENT ILLNESS
[de-identified] : 27 year F with PMH pre-DM, exercise induced asthma, and IBS presents for CPE. Pt denies CP/SOB, fever/chills, n/v/d/c.

## 2022-07-06 NOTE — HEALTH RISK ASSESSMENT
[Good] : ~his/her~  mood as  good [Never] : Never [Yes] : Yes [Monthly or less (1 pt)] : Monthly or less (1 point) [1 or 2 (0 pts)] : 1 or 2 (0 points) [No] : In the past 12 months have you used drugs other than those required for medical reasons? No [No falls in past year] : Patient reported no falls in the past year [0] : 2) Feeling down, depressed, or hopeless: Not at all (0) [PHQ-2 Negative - No further assessment needed] : PHQ-2 Negative - No further assessment needed [Patient reported PAP Smear was normal] : Patient reported PAP Smear was normal [None] : None [With Family] : lives with family [Employed] : employed [Audit-CScore] : 1 [AUT6Yvxuj] : 0 [PapSmearDate] : 03/22

## 2022-07-06 NOTE — PLAN
[FreeTextEntry1] : Routine blood work and urine today. ECG today. Pt advised to sign up for [facility] portal to review labs and communicate any questions or concerns directly. Yearly physical and return as needed for illness, medication refills, and new or existing complaints.

## 2022-07-08 ENCOUNTER — TRANSCRIPTION ENCOUNTER (OUTPATIENT)
Age: 27
End: 2022-07-08

## 2022-07-08 LAB
25(OH)D3 SERPL-MCNC: 26.8 NG/ML
ALBUMIN SERPL ELPH-MCNC: 4.5 G/DL
ALP BLD-CCNC: 95 U/L
ALT SERPL-CCNC: 7 U/L
ANION GAP SERPL CALC-SCNC: 11 MMOL/L
APPEARANCE: CLEAR
AST SERPL-CCNC: 11 U/L
BACTERIA: NEGATIVE
BASOPHILS # BLD AUTO: 0.04 K/UL
BASOPHILS NFR BLD AUTO: 0.6 %
BILIRUB SERPL-MCNC: <0.2 MG/DL
BILIRUBIN URINE: NEGATIVE
BLOOD URINE: NEGATIVE
BUN SERPL-MCNC: 12 MG/DL
CALCIUM SERPL-MCNC: 9.3 MG/DL
CHLORIDE SERPL-SCNC: 107 MMOL/L
CHOLEST SERPL-MCNC: 135 MG/DL
CO2 SERPL-SCNC: 24 MMOL/L
COLOR: NORMAL
CREAT SERPL-MCNC: 0.71 MG/DL
EGFR: 119 ML/MIN/1.73M2
EOSINOPHIL # BLD AUTO: 0.22 K/UL
EOSINOPHIL NFR BLD AUTO: 3.1 %
ESTIMATED AVERAGE GLUCOSE: 111 MG/DL
GLUCOSE QUALITATIVE U: NEGATIVE
GLUCOSE SERPL-MCNC: 100 MG/DL
HBA1C MFR BLD HPLC: 5.5 %
HCT VFR BLD CALC: 42.8 %
HDLC SERPL-MCNC: 54 MG/DL
HGB BLD-MCNC: 12.7 G/DL
HYALINE CASTS: 0 /LPF
IMM GRANULOCYTES NFR BLD AUTO: 0.3 %
KETONES URINE: NEGATIVE
LDLC SERPL CALC-MCNC: 59 MG/DL
LEUKOCYTE ESTERASE URINE: NEGATIVE
LYMPHOCYTES # BLD AUTO: 2.08 K/UL
LYMPHOCYTES NFR BLD AUTO: 29.3 %
MAN DIFF?: NORMAL
MCHC RBC-ENTMCNC: 28.7 PG
MCHC RBC-ENTMCNC: 29.7 GM/DL
MCV RBC AUTO: 96.6 FL
MICROSCOPIC-UA: NORMAL
MONOCYTES # BLD AUTO: 0.41 K/UL
MONOCYTES NFR BLD AUTO: 5.8 %
NEUTROPHILS # BLD AUTO: 4.33 K/UL
NEUTROPHILS NFR BLD AUTO: 60.9 %
NITRITE URINE: NEGATIVE
NONHDLC SERPL-MCNC: 81 MG/DL
PH URINE: 5.5
PLATELET # BLD AUTO: 359 K/UL
POTASSIUM SERPL-SCNC: 4.7 MMOL/L
PROT SERPL-MCNC: 6.7 G/DL
PROTEIN URINE: NEGATIVE
RBC # BLD: 4.43 M/UL
RBC # FLD: 15.2 %
RED BLOOD CELLS URINE: 1 /HPF
SODIUM SERPL-SCNC: 142 MMOL/L
SPECIFIC GRAVITY URINE: 1.03
SQUAMOUS EPITHELIAL CELLS: 1 /HPF
T4 FREE SERPL-MCNC: 1.1 NG/DL
TRIGL SERPL-MCNC: 109 MG/DL
TSH SERPL-ACNC: 1.21 UIU/ML
UROBILINOGEN URINE: NORMAL
WBC # FLD AUTO: 7.1 K/UL
WHITE BLOOD CELLS URINE: 0 /HPF

## 2022-07-22 DIAGNOSIS — T75.3XXA MOTION SICKNESS, INITIAL ENCOUNTER: ICD-10-CM

## 2022-07-22 RX ORDER — SCOPOLAMINE 1.5 MG/1
1 PATCH, EXTENDED RELEASE TRANSDERMAL
Qty: 1 | Refills: 0 | Status: ACTIVE | COMMUNITY
Start: 2022-07-22 | End: 1900-01-01

## 2022-08-08 ENCOUNTER — APPOINTMENT (OUTPATIENT)
Dept: ORTHOPEDIC SURGERY | Facility: CLINIC | Age: 27
End: 2022-08-08

## 2022-08-08 VITALS — HEIGHT: 63 IN | BODY MASS INDEX: 27.64 KG/M2 | WEIGHT: 156 LBS

## 2022-08-08 DIAGNOSIS — E11.9 TYPE 2 DIABETES MELLITUS W/OUT COMPLICATIONS: ICD-10-CM

## 2022-08-08 DIAGNOSIS — M50.10 CERVICAL DISC DISORDER WITH RADICULOPATHY, UNSPECIFIED CERVICAL REGION: ICD-10-CM

## 2022-08-08 DIAGNOSIS — E28.2 POLYCYSTIC OVARIAN SYNDROME: ICD-10-CM

## 2022-08-08 DIAGNOSIS — M54.12 RADICULOPATHY, CERVICAL REGION: ICD-10-CM

## 2022-08-08 PROCEDURE — 99213 OFFICE O/P EST LOW 20 MIN: CPT

## 2022-08-08 PROCEDURE — 99072 ADDL SUPL MATRL&STAF TM PHE: CPT

## 2022-08-08 PROCEDURE — 73030 X-RAY EXAM OF SHOULDER: CPT | Mod: LT

## 2022-08-08 RX ORDER — METHYLPREDNISOLONE 4 MG/1
4 TABLET ORAL
Qty: 1 | Refills: 0 | Status: COMPLETED | COMMUNITY
Start: 2022-08-08 | End: 2022-09-07

## 2022-08-08 NOTE — IMAGING
[Left] : left shoulder [There are no fractures, subluxations or dislocations. No significant abnormalities are seen] : There are no fractures, subluxations or dislocations. No significant abnormalities are seen [FreeTextEntry1] : unremarkable

## 2022-08-08 NOTE — HISTORY OF PRESENT ILLNESS
[Sudden] : sudden [10] : 10 [Dull/Aching] : dull/aching [Sharp] : sharp [Intermittent] : intermittent [Nothing helps with pain getting better] : Nothing helps with pain getting better [de-identified] : 8/12/20- Had MRI: IMPRESSION:\par Limited due to patient motion artifact.\par Mild multifocal rotator cuff tendinosis. No discrete rotator cuff tear.\par Mild acromioclavicular joint arthrosis\par \par 8/8/2022:  LT Shoulder\par LT Shoulder injury. In 2020 the pt was going to reach for something in the cabinet and felt a sharp pain in her shoulder. Pt has been treated in the past by Dr magdaleno for her shoulder injury. \par \par Pt will take medrol pack, and if pain persists, MRI will be considered.\par \par  [] : no [FreeTextEntry1] : LT shoulder

## 2022-08-08 NOTE — DISCUSSION/SUMMARY
[Medication Risks Reviewed] : Medication risks reviewed [de-identified] : Today we reviewed the risks, benefits and alternatives of taking Medrol 4mg steroid therapy pack.  Medrol was prescribed to reduce pain and symptoms, and the proper usage of this medication was described to the patient.  \par \par The risks, benefits and alternatives of cortisone injection therapy to reduce inflammation and pain in the knee were discussed with the patient.\par Pt has a history of diabetes and PCOS, and blood sugar levels may be elevated temporarily if an injection was to be performed.\par \par \par Pain in the LT shoulder can go away without treatment.  The risks, benefits and alternatives to CSI was discussed.\par \par If the pain worsens an MRI will be ordered for epidural injections with a pain management specialist was discussed.\par \par Extra strength Tylenol and dual action Advil was recommended.\par \par Pt will F/U in one week.\par \par \par \par

## 2022-08-08 NOTE — PHYSICAL EXAM
[Left] : left shoulder [FreeTextEntry3] : Cervical spine flexion produces radicular pain.  \par Neck rotation to the right is well tolerated.\par Rotation left produces mild left trap symptoms\par Extension is full and well tolerated.\par Full neck flexion produces all of the radicular symptoms in the arm down to the long ring and little fingers.  \par She also feels paresthesias in the left arm down to the fingers.\par Biceps reflexes are 0 equal\par Triceps reflexes are 1+ equal

## 2022-08-17 ENCOUNTER — APPOINTMENT (OUTPATIENT)
Dept: NEUROLOGY | Facility: CLINIC | Age: 27
End: 2022-08-17

## 2022-08-17 DIAGNOSIS — R41.9 UNSPECIFIED SYMPTOMS AND SIGNS INVOLVING COGNITIVE FUNCTIONS AND AWARENESS: ICD-10-CM

## 2022-08-17 DIAGNOSIS — U09.9 POST COVID-19 CONDITION, UNSPECIFIED: ICD-10-CM

## 2022-08-17 DIAGNOSIS — G43.909 MIGRAINE, UNSPECIFIED, NOT INTRACTABLE, W/OUT STATUS MIGRAINOSUS: ICD-10-CM

## 2022-08-17 PROCEDURE — 99213 OFFICE O/P EST LOW 20 MIN: CPT | Mod: 95

## 2022-08-17 RX ORDER — NORTRIPTYLINE HYDROCHLORIDE 10 MG/1
10 CAPSULE ORAL
Qty: 60 | Refills: 2 | Status: ACTIVE | COMMUNITY
Start: 2022-08-17 | End: 1900-01-01

## 2022-08-17 NOTE — HISTORY OF PRESENT ILLNESS
[FreeTextEntry1] : 27 year old f  with PCOS Pre diabetes, migraines and anxiety  presents,   with multiple complaints. She reports she was in her usual state of health until January 2021 when she had covid- had SOB, cough,HA fever. required home oxygen x1 month. never hospitalized. she returned to work after 2 months.Since then, memory has been off-STM, forgets tasks, difficultly with focusing.  \par she is able to be productive at work, writes things down. \par had been feeling weakness in limbs, since losing weight, has helped with this\par  \par Migraines became more frequent \par initial onset: Age 15\par Location:temporal, occipital \par described as pressure sharp and squeezing. \par Timing of headache: varies\par Associated symptoms: blurred vision photophobia, phonophobia, dizziness, n/v\par Pertinent negatives visual aura, scotoma, memory impairment, neck stiffness, nausea\par Relived by: dark room, rest,  OTC doesn’t help. triptans cause side effects\par Severity:  7/8\par Occurs;1week/month\par Duration: 1-4days  \par Sleeps:   at times, reports snoring \par Hydration: water:  96 oz caffeine: 1cup/d\par Triggers:rudy, lack of sleep, loud noise, bright lights.\par \par \par **Interval 8.17.22:\par since last seen, Nurtec has been very effective, resolves in 20 minutes with no adverse effects. she continues to have dull HAs that occur atleat 3x/week, doesn’t always take something. \par pending sleep evaluation and cognitive testing. can loose track of conversations    \par stress has not changed\par  \par \par \par  \par Diagnostics:\par Brain MRI & pit. reported normal \par  \par

## 2022-08-17 NOTE — REVIEW OF SYSTEMS
[As Noted in HPI] : as noted in HPI [Suicidal] : not suicidal [Sleep Disturbances] : sleep disturbances [Negative] : Constitutional

## 2022-08-17 NOTE — REASON FOR VISIT
[Follow-Up: _____] : a [unfilled] follow-up visit [Home] : at home, [unfilled] , at the time of the visit. [Medical Office: ([facility])___] : at the medical office located in  [Patient] : the patient

## 2022-08-17 NOTE — PHYSICAL EXAM
[Person] : oriented to person [Place] : oriented to place [Time] : oriented to time [Short Term Intact] : short term memory intact [Remote Intact] : remote memory intact [Registration Intact] : recent registration memory intact [Concentration Intact] : normal concentrating ability [Naming Objects] : no difficulty naming common objects [Repeating Phrases] : no difficulty repeating a phrase [Fluency] : fluency intact [Comprehension] : comprehension intact [Current Events] : adequate knowledge of current events [Past History] : adequate knowledge of personal past history [Cranial Nerves Optic (II)] : visual acuity intact bilaterally,  visual fields full to confrontation, pupils equal round and reactive to light [Cranial Nerves Oculomotor (III)] : extraocular motion intact [Cranial Nerves Facial (VII)] : face symmetrical [Cranial Nerves Glossopharyngeal (IX)] : tongue and palate midline [Cranial Nerves Accessory (XI - Cranial And Spinal)] : head turning and shoulder shrug symmetric [Cranial Nerves Hypoglossal (XII)] : there was no tongue deviation with protrusion [No Muscle Atrophy] : normal bulk in all four extremities [Motor Handedness Right-Handed] : the patient is right hand dominant [Sensation Tactile Decrease] : light touch was intact [Abnormal Walk] : normal gait [Balance] : balance was intact [Past-pointing] : there was no past-pointing [Tremor] : no tremor present [Plantar Reflex Right Only] : normal on the right [Plantar Reflex Left Only] : normal on the left [] : no respiratory distress

## 2022-08-17 NOTE — ASSESSMENT
[FreeTextEntry1] : 27 year old f  with PCOS Pre diabetes,anxiety migraines and  presents p/w chronic migraine headaches,  cognitive complaints s/p Covid infection 2021.Pending cognitive testing and HST. Migraines have been less, but still with 15 HA days despite being on vitamin therapy. will trial low dose nortriptyline 10mg titrated, s/e discussed\par continue vitamin therapy Magnesium oxide 400mg qhs, vitamin b2 400mg qd\par Nurtrec PRN  \par  \par HST r/o mathew\par mind games \par \par Keeping a diary has been discussed, including Apps- Migraine perry.  \par Manage stress. Stress may trigger a migraine. Learn new ways to relax, such as\par deep breathing.Talk therapy discussed, referred. \par Create a sleep schedule. Go to bed and get up at the same times each day. Do\par not watch television or go on electronics in bed.\par Eat regular meals.. Maintain adequate water intake.\par contact me if there are any changes in the quality or severity of the headaches.\par All questions answered, understanding verbalized.Patient in agreement with plan of care\par \par

## 2022-08-18 ENCOUNTER — APPOINTMENT (OUTPATIENT)
Dept: ORTHOPEDIC SURGERY | Facility: CLINIC | Age: 27
End: 2022-08-18

## 2022-08-19 ENCOUNTER — TRANSCRIPTION ENCOUNTER (OUTPATIENT)
Age: 27
End: 2022-08-19

## 2022-08-23 ENCOUNTER — APPOINTMENT (OUTPATIENT)
Dept: ORTHOPEDIC SURGERY | Facility: CLINIC | Age: 27
End: 2022-08-23

## 2022-09-30 ENCOUNTER — APPOINTMENT (OUTPATIENT)
Dept: NEUROLOGY | Facility: CLINIC | Age: 27
End: 2022-09-30

## 2022-10-21 ENCOUNTER — APPOINTMENT (OUTPATIENT)
Dept: INTERNAL MEDICINE | Facility: CLINIC | Age: 27
End: 2022-10-21

## 2022-11-01 ENCOUNTER — APPOINTMENT (OUTPATIENT)
Dept: NEUROLOGY | Facility: CLINIC | Age: 27
End: 2022-11-01

## 2022-11-04 RX ORDER — RIMEGEPANT SULFATE 75 MG/75MG
75 TABLET, ORALLY DISINTEGRATING ORAL
Qty: 8 | Refills: 2 | Status: ACTIVE | COMMUNITY
Start: 2022-05-10

## 2022-11-22 NOTE — HISTORY OF PRESENT ILLNESS
[TextBox_4] : \par Ms. 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 \par She is here for f/u today and reports feeling worse again. Has multiple complaints. \par \par Over last 4-5 days, feeling drained. sleeps for an hour, wakes up many times. She has HA, body aches and diffuse pains. \par She again reports significant tachycardia up to 150s with exertion, dizziness, chest pain/pressure. She also has pain behind her right knee but no JAIMIE. She still feels SOB and is tachypneic with exertion. \par 
oriented to person, place and time

## 2023-02-02 NOTE — HEALTH RISK ASSESSMENT
Pt ambulatory to triage. Pts mother reports abnormal labs at PCP, potassium 2.8, told to come to hospital for further eval. A/Ox4, RR WNL, skin PWD.    [] : Yes [Yes] : Yes [Monthly or less (1 pt)] : Monthly or less (1 point) [1 or 2 (0 pts)] : 1 or 2 (0 points) [Never (0 pts)] : Never (0 points) [No] : In the past 12 months have you used drugs other than those required for medical reasons? No [0] : 2) Feeling down, depressed, or hopeless: Not at all (0) [Audit-CScore] : 1 [[id number]] : 0

## 2023-02-26 ENCOUNTER — NON-APPOINTMENT (OUTPATIENT)
Age: 28
End: 2023-02-26

## 2023-02-26 ENCOUNTER — EMERGENCY (EMERGENCY)
Facility: HOSPITAL | Age: 28
LOS: 1 days | Discharge: HOME | End: 2023-02-26
Attending: STUDENT IN AN ORGANIZED HEALTH CARE EDUCATION/TRAINING PROGRAM | Admitting: STUDENT IN AN ORGANIZED HEALTH CARE EDUCATION/TRAINING PROGRAM
Payer: COMMERCIAL

## 2023-02-26 ENCOUNTER — TRANSCRIPTION ENCOUNTER (OUTPATIENT)
Age: 28
End: 2023-02-26

## 2023-02-26 VITALS
SYSTOLIC BLOOD PRESSURE: 123 MMHG | RESPIRATION RATE: 16 BRPM | WEIGHT: 158.07 LBS | TEMPERATURE: 98 F | OXYGEN SATURATION: 97 % | HEIGHT: 63 IN | HEART RATE: 95 BPM | DIASTOLIC BLOOD PRESSURE: 70 MMHG

## 2023-02-26 VITALS
DIASTOLIC BLOOD PRESSURE: 77 MMHG | HEART RATE: 81 BPM | SYSTOLIC BLOOD PRESSURE: 122 MMHG | RESPIRATION RATE: 16 BRPM | OXYGEN SATURATION: 96 %

## 2023-02-26 DIAGNOSIS — N83.519 TORSION OF OVARY AND OVARIAN PEDICLE, UNSPECIFIED SIDE: Chronic | ICD-10-CM

## 2023-02-26 DIAGNOSIS — Z90.89 ACQUIRED ABSENCE OF OTHER ORGANS: Chronic | ICD-10-CM

## 2023-02-26 LAB
ALBUMIN SERPL ELPH-MCNC: 3.9 G/DL — SIGNIFICANT CHANGE UP (ref 3.3–5)
ALP SERPL-CCNC: 90 U/L — SIGNIFICANT CHANGE UP (ref 40–120)
ALT FLD-CCNC: 17 U/L — SIGNIFICANT CHANGE UP (ref 12–78)
ANION GAP SERPL CALC-SCNC: 7 MMOL/L — SIGNIFICANT CHANGE UP (ref 5–17)
AST SERPL-CCNC: 11 U/L — LOW (ref 15–37)
BASOPHILS # BLD AUTO: 0.03 K/UL — SIGNIFICANT CHANGE UP (ref 0–0.2)
BASOPHILS NFR BLD AUTO: 0.3 % — SIGNIFICANT CHANGE UP (ref 0–2)
BILIRUB SERPL-MCNC: 0.3 MG/DL — SIGNIFICANT CHANGE UP (ref 0.2–1.2)
BUN SERPL-MCNC: 13 MG/DL — SIGNIFICANT CHANGE UP (ref 7–23)
CALCIUM SERPL-MCNC: 9.4 MG/DL — SIGNIFICANT CHANGE UP (ref 8.5–10.1)
CHLORIDE SERPL-SCNC: 106 MMOL/L — SIGNIFICANT CHANGE UP (ref 96–108)
CO2 SERPL-SCNC: 27 MMOL/L — SIGNIFICANT CHANGE UP (ref 22–31)
CREAT SERPL-MCNC: 0.71 MG/DL — SIGNIFICANT CHANGE UP (ref 0.5–1.3)
EGFR: 119 ML/MIN/1.73M2 — SIGNIFICANT CHANGE UP
EOSINOPHIL # BLD AUTO: 0.13 K/UL — SIGNIFICANT CHANGE UP (ref 0–0.5)
EOSINOPHIL NFR BLD AUTO: 1.4 % — SIGNIFICANT CHANGE UP (ref 0–6)
GLUCOSE SERPL-MCNC: 85 MG/DL — SIGNIFICANT CHANGE UP (ref 70–99)
HCG SERPL-ACNC: <1 MIU/ML — SIGNIFICANT CHANGE UP
HCT VFR BLD CALC: 39.3 % — SIGNIFICANT CHANGE UP (ref 34.5–45)
HGB BLD-MCNC: 12.7 G/DL — SIGNIFICANT CHANGE UP (ref 11.5–15.5)
IMM GRANULOCYTES NFR BLD AUTO: 0.2 % — SIGNIFICANT CHANGE UP (ref 0–0.9)
LIDOCAIN IGE QN: 70 U/L — LOW (ref 73–393)
LYMPHOCYTES # BLD AUTO: 2.43 K/UL — SIGNIFICANT CHANGE UP (ref 1–3.3)
LYMPHOCYTES # BLD AUTO: 25.7 % — SIGNIFICANT CHANGE UP (ref 13–44)
MCHC RBC-ENTMCNC: 28.7 PG — SIGNIFICANT CHANGE UP (ref 27–34)
MCHC RBC-ENTMCNC: 32.3 GM/DL — SIGNIFICANT CHANGE UP (ref 32–36)
MCV RBC AUTO: 88.7 FL — SIGNIFICANT CHANGE UP (ref 80–100)
MONOCYTES # BLD AUTO: 0.45 K/UL — SIGNIFICANT CHANGE UP (ref 0–0.9)
MONOCYTES NFR BLD AUTO: 4.8 % — SIGNIFICANT CHANGE UP (ref 2–14)
NEUTROPHILS # BLD AUTO: 6.41 K/UL — SIGNIFICANT CHANGE UP (ref 1.8–7.4)
NEUTROPHILS NFR BLD AUTO: 67.6 % — SIGNIFICANT CHANGE UP (ref 43–77)
NRBC # BLD: 0 /100 WBCS — SIGNIFICANT CHANGE UP (ref 0–0)
PLATELET # BLD AUTO: 347 K/UL — SIGNIFICANT CHANGE UP (ref 150–400)
POTASSIUM SERPL-MCNC: 3.8 MMOL/L — SIGNIFICANT CHANGE UP (ref 3.5–5.3)
POTASSIUM SERPL-SCNC: 3.8 MMOL/L — SIGNIFICANT CHANGE UP (ref 3.5–5.3)
PROT SERPL-MCNC: 7.9 G/DL — SIGNIFICANT CHANGE UP (ref 6–8.3)
RBC # BLD: 4.43 M/UL — SIGNIFICANT CHANGE UP (ref 3.8–5.2)
RBC # FLD: 13.2 % — SIGNIFICANT CHANGE UP (ref 10.3–14.5)
SODIUM SERPL-SCNC: 140 MMOL/L — SIGNIFICANT CHANGE UP (ref 135–145)
TROPONIN I, HIGH SENSITIVITY RESULT: <3 NG/L — SIGNIFICANT CHANGE UP
WBC # BLD: 9.47 K/UL — SIGNIFICANT CHANGE UP (ref 3.8–10.5)
WBC # FLD AUTO: 9.47 K/UL — SIGNIFICANT CHANGE UP (ref 3.8–10.5)

## 2023-02-26 PROCEDURE — 36415 COLL VENOUS BLD VENIPUNCTURE: CPT

## 2023-02-26 PROCEDURE — 99285 EMERGENCY DEPT VISIT HI MDM: CPT | Mod: 25

## 2023-02-26 PROCEDURE — 84484 ASSAY OF TROPONIN QUANT: CPT

## 2023-02-26 PROCEDURE — 74177 CT ABD & PELVIS W/CONTRAST: CPT | Mod: MA

## 2023-02-26 PROCEDURE — 71045 X-RAY EXAM CHEST 1 VIEW: CPT | Mod: 26

## 2023-02-26 PROCEDURE — 84702 CHORIONIC GONADOTROPIN TEST: CPT

## 2023-02-26 PROCEDURE — 96376 TX/PRO/DX INJ SAME DRUG ADON: CPT

## 2023-02-26 PROCEDURE — 83690 ASSAY OF LIPASE: CPT

## 2023-02-26 PROCEDURE — 93005 ELECTROCARDIOGRAM TRACING: CPT

## 2023-02-26 PROCEDURE — 80053 COMPREHEN METABOLIC PANEL: CPT

## 2023-02-26 PROCEDURE — 76705 ECHO EXAM OF ABDOMEN: CPT

## 2023-02-26 PROCEDURE — 99285 EMERGENCY DEPT VISIT HI MDM: CPT

## 2023-02-26 PROCEDURE — 71045 X-RAY EXAM CHEST 1 VIEW: CPT

## 2023-02-26 PROCEDURE — 76705 ECHO EXAM OF ABDOMEN: CPT | Mod: 26

## 2023-02-26 PROCEDURE — 93010 ELECTROCARDIOGRAM REPORT: CPT | Mod: 76

## 2023-02-26 PROCEDURE — 74177 CT ABD & PELVIS W/CONTRAST: CPT | Mod: 26,MA

## 2023-02-26 PROCEDURE — 85025 COMPLETE CBC W/AUTO DIFF WBC: CPT

## 2023-02-26 PROCEDURE — 96374 THER/PROPH/DIAG INJ IV PUSH: CPT | Mod: XU

## 2023-02-26 PROCEDURE — 96375 TX/PRO/DX INJ NEW DRUG ADDON: CPT

## 2023-02-26 RX ORDER — FAMOTIDINE 10 MG/ML
20 INJECTION INTRAVENOUS ONCE
Refills: 0 | Status: COMPLETED | OUTPATIENT
Start: 2023-02-26 | End: 2023-02-26

## 2023-02-26 RX ORDER — LIDOCAINE 4 G/100G
10 CREAM TOPICAL ONCE
Refills: 0 | Status: COMPLETED | OUTPATIENT
Start: 2023-02-26 | End: 2023-02-26

## 2023-02-26 RX ORDER — MORPHINE SULFATE 50 MG/1
4 CAPSULE, EXTENDED RELEASE ORAL ONCE
Refills: 0 | Status: DISCONTINUED | OUTPATIENT
Start: 2023-02-26 | End: 2023-02-26

## 2023-02-26 RX ORDER — FAMOTIDINE 10 MG/ML
1 INJECTION INTRAVENOUS
Qty: 60 | Refills: 0
Start: 2023-02-26 | End: 2023-03-27

## 2023-02-26 RX ORDER — ACETAMINOPHEN 500 MG
1000 TABLET ORAL ONCE
Refills: 0 | Status: COMPLETED | OUTPATIENT
Start: 2023-02-26 | End: 2023-02-26

## 2023-02-26 RX ORDER — MORPHINE SULFATE 50 MG/1
2 CAPSULE, EXTENDED RELEASE ORAL ONCE
Refills: 0 | Status: DISCONTINUED | OUTPATIENT
Start: 2023-02-26 | End: 2023-02-26

## 2023-02-26 RX ADMIN — MORPHINE SULFATE 4 MILLIGRAM(S): 50 CAPSULE, EXTENDED RELEASE ORAL at 19:28

## 2023-02-26 RX ADMIN — FAMOTIDINE 20 MILLIGRAM(S): 10 INJECTION INTRAVENOUS at 22:57

## 2023-02-26 RX ADMIN — LIDOCAINE 10 MILLILITER(S): 4 CREAM TOPICAL at 22:57

## 2023-02-26 RX ADMIN — Medication 400 MILLIGRAM(S): at 21:23

## 2023-02-26 RX ADMIN — MORPHINE SULFATE 4 MILLIGRAM(S): 50 CAPSULE, EXTENDED RELEASE ORAL at 18:58

## 2023-02-26 RX ADMIN — Medication 30 MILLILITER(S): at 22:57

## 2023-02-26 RX ADMIN — MORPHINE SULFATE 2 MILLIGRAM(S): 50 CAPSULE, EXTENDED RELEASE ORAL at 21:43

## 2023-02-26 RX ADMIN — Medication 1000 MILLIGRAM(S): at 21:53

## 2023-02-26 RX ADMIN — Medication 20 MILLIGRAM(S): at 22:56

## 2023-02-26 RX ADMIN — MORPHINE SULFATE 2 MILLIGRAM(S): 50 CAPSULE, EXTENDED RELEASE ORAL at 22:13

## 2023-02-26 RX ADMIN — Medication 1000 MILLIGRAM(S): at 21:38

## 2023-02-26 NOTE — ED PROVIDER NOTE - CARE PROVIDER_API CALL
Brooks Avila  GASTROENTEROLOGY  [address] [place] [address]  Phone: [phone]  Fax: [phone]  Established Patient  Follow Up Time: 1-3 Days

## 2023-02-26 NOTE — ED PROVIDER NOTE - PATIENT PORTAL LINK FT
You can access the FollowMyHealth Patient Portal offered by [facility] by registering at the following website: [email or web address]. By joining [employer]’s FollowMyHealth portal, you will also be able to view your health information using other applications (apps) compatible with our system.

## 2023-02-26 NOTE — ED ADULT TRIAGE NOTE - PRO INTERPRETER NEED 2
A/P: 21y Male s/p repair of right testicular rupture   DVT prophylaxis/OOB  Incentive spirometry  Analgesia and antiemetics as needed  regular Diet  d/c planning in am English

## 2023-02-26 NOTE — ED PROVIDER NOTE - CONSIDERATION OF ADMISSION OBSERVATION
Consideration of Admission/Observation Pending results: for further work up, treatment or evaluation.

## 2023-02-26 NOTE — ED ADULT NURSE NOTE - OBJECTIVE STATEMENT
patient A&Ox4. patient comes in complaining of epigastric and right sided abd pain, has been having intermittent abd pain. 1 episode of loose stool and lower back pain.

## 2023-02-26 NOTE — ED ADULT TRIAGE NOTE - CHIEF COMPLAINT QUOTE
Epigastric and right sided abd pain x 1hr, has been having intermittent abd pain. 1 episode of loose stool and lower back pain. LPM

## 2023-02-26 NOTE — ED PROVIDER NOTE - CLINICAL SUMMARY MEDICAL DECISION MAKING FREE TEXT BOX
With epigastric/right upper quadrant tenderness to palpation and pain.  Check labs, imaging, treat symptoms, reevaluate.

## 2023-02-26 NOTE — ED PROVIDER NOTE - OBJECTIVE STATEMENT
27-year-old female without reported past medical history presents today complaining of epigastric and right upper quadrant abdominal pain since Wednesday.  Patient reports the pain has been intermittent, described as pulling, and currently 10 out of 10.  Patient reports the pain is radiating to her back.  Patient denies melena, fever, vomiting, dysuria, hematuria, chest pain, shortness of breath, or any other complaints.

## 2023-02-26 NOTE — ED PROVIDER NOTE - ATTENDING APP SHARED VISIT CONTRIBUTION OF CARE
This was a shared visit with ZION. I reviewed and verified the documentation and independently performed the documented MDM.

## 2023-02-26 NOTE — ED ADULT TRIAGE NOTE - HEIGHT IN FEET
Primary Nurse Idalmis Hedrick RN and Byron Rush RN performed a dual skin assessment on this patient No impairment noted  Tristin score is 18 5

## 2023-02-26 NOTE — ED PROVIDER NOTE - PHYSICAL EXAMINATION
Constitutional: Awake, Alert, non-toxic. NAD. Well appearing, well nourished.   HEAD: Normocephalic, atraumatic.   EYES: EOM intact, conjunctiva and sclera are clear bilaterally.   ENT: No rhinorrhea, patent, mucous membranes pink/moist, no drooling or stridor.   NECK: Supple, non-tender  CARDIOVASCULAR: Normal S1, S2; regular rate and rhythm.  RESPIRATORY: Normal respiratory effort; breath sounds CTAB, no wheezes, rhonchi, or rales. Speaking in full sentences. No accessory muscle use.   ABDOMEN: Soft; (+) epigastric and RUQ TTP, no guarding or rebound TTP.   EXTREMITIES: Full passive and active ROM in all extremities; non-tender to palpation; distal pulses palpable and symmetric  SKIN: Warm, dry; good skin turgor, no apparent lesions or rashes, no ecchymosis, brisk capillary refill.  NEURO: A&O x3. Sensory and motor functions are grossly intact. Speech is normal. Appearance and judgement seem appropriate for gender and age.

## 2023-02-26 NOTE — ED ADULT TRIAGE NOTE - CCCP TRG CHIEF CMPLNT
Carolann from [facility] called. They received paperwork from lab of a positive chlamydia test although there was not a phone number for an outreach for her.   She confirmed patients name and birthdate.   She wanted to know if her was single and what sex his partner was. I informed her that he is  and there is not dictation about the sex of his partner from his recent appointment.  I also let her know that when I talked to him on 1-8-18 he had not picked up his medication due to finances.  I gave Carolann his phone number.  Connie SALEH PSR contacted Dary Munoz to verify it was ok to talk to Carolann.   abdominal pain

## 2023-02-27 ENCOUNTER — APPOINTMENT (OUTPATIENT)
Dept: INTERNAL MEDICINE | Facility: CLINIC | Age: 28
End: 2023-02-27
Payer: COMMERCIAL

## 2023-02-27 VITALS
BODY MASS INDEX: 28 KG/M2 | HEART RATE: 81 BPM | WEIGHT: 158 LBS | OXYGEN SATURATION: 96 % | SYSTOLIC BLOOD PRESSURE: 112 MMHG | HEIGHT: 63 IN | TEMPERATURE: 97.5 F | DIASTOLIC BLOOD PRESSURE: 76 MMHG

## 2023-02-27 DIAGNOSIS — R10.11 RIGHT UPPER QUADRANT PAIN: ICD-10-CM

## 2023-02-27 PROCEDURE — 99212 OFFICE O/P EST SF 10 MIN: CPT

## 2023-02-27 RX ORDER — DICYCLOMINE HYDROCHLORIDE 10 MG/1
CAPSULE ORAL
Refills: 0 | Status: ACTIVE | COMMUNITY

## 2023-02-27 RX ORDER — BISMUTH SUBSALICYLATE 262 MG/1
262 TABLET, CHEWABLE ORAL 4 TIMES DAILY
Qty: 56 | Refills: 0 | Status: DISCONTINUED | COMMUNITY
Start: 2022-06-29 | End: 2023-02-27

## 2023-02-27 RX ORDER — METRONIDAZOLE 500 MG/1
500 TABLET ORAL 3 TIMES DAILY
Qty: 21 | Refills: 0 | Status: DISCONTINUED | COMMUNITY
Start: 2022-06-29 | End: 2023-02-27

## 2023-02-27 RX ORDER — OMEPRAZOLE 40 MG/1
40 CAPSULE, DELAYED RELEASE ORAL TWICE DAILY
Qty: 28 | Refills: 3 | Status: DISCONTINUED | COMMUNITY
Start: 2022-06-29 | End: 2023-02-27

## 2023-02-27 RX ORDER — TETRACYCLINE HYDROCHLORIDE 500 MG/1
500 CAPSULE ORAL
Qty: 28 | Refills: 0 | Status: DISCONTINUED | COMMUNITY
Start: 2022-06-29 | End: 2023-02-27

## 2023-02-27 RX ORDER — FAMOTIDINE 40 MG/1
TABLET, FILM COATED ORAL
Refills: 0 | Status: ACTIVE | COMMUNITY

## 2023-02-27 NOTE — PLAN
[FreeTextEntry1] : Reviewed imaging and blood work. Pt is advised to see GI today and to heed their plan. I suggest an MRCP. If GI does not pursue the imaging pt will reach out to me and I will place order, I think we have reasonable justification from the US / CT failure of correlation. Pt advised to sign up for [facility] portal to review labs and communicate any questions or concerns directly. Yearly physical and return as needed for illness, medication refills, and new or existing complaints.

## 2023-02-27 NOTE — HISTORY OF PRESENT ILLNESS
[de-identified] : 27 year F with PMH pre-DM, exercise induced asthma, and IBS presents after ER visit for sharp RUQ pain. Pt denies CP/SOB, fever/chills, n/v/d/c.

## 2023-02-27 NOTE — REVIEW OF SYSTEMS
[Abdominal Pain] : abdominal pain [Diarrhea] : diarrhea [Negative] : Psychiatric [Nausea] : no nausea [Constipation] : no constipation [Vomiting] : no vomiting [Heartburn] : no heartburn [Melena] : no melena [FreeTextEntry7] : loss of appetite

## 2023-02-27 NOTE — PHYSICAL EXAM
[No Acute Distress] : no acute distress [Well Nourished] : well nourished [Well Developed] : well developed [Well-Appearing] : well-appearing [No Respiratory Distress] : no respiratory distress  [No Accessory Muscle Use] : no accessory muscle use [Coordination Grossly Intact] : coordination grossly intact [No Focal Deficits] : no focal deficits [Normal Gait] : normal gait [Normal Affect] : the affect was normal [Normal Insight/Judgement] : insight and judgment were intact [de-identified] : tender RUQ, did not deep palpate due to pain

## 2023-02-28 ENCOUNTER — APPOINTMENT (OUTPATIENT)
Dept: MRI IMAGING | Facility: CLINIC | Age: 28
End: 2023-02-28
Payer: COMMERCIAL

## 2023-02-28 ENCOUNTER — RESULT REVIEW (OUTPATIENT)
Age: 28
End: 2023-02-28

## 2023-02-28 PROCEDURE — A9585: CPT | Mod: JW

## 2023-02-28 PROCEDURE — 74183 MRI ABD W/O CNTR FLWD CNTR: CPT

## 2023-03-07 NOTE — ED ADULT TRIAGE NOTE - ADDITIONAL SAFETY/BANDS...
Call placed to review procedure and appts. VM left with details. Encouraged to call back if have any questions.   
Call returned to Erwin. Let him know I set up pre op labs appt at PCP office per request at 9 am 3/8.    Pt requesting surgery arrival time due to traveling from [place] and needs to plan a ride. Surgery arrival time for Dr. Owen 930 am on 3/16. Verbalized understanding.   
Additional Safety/Bands:

## 2023-03-08 ENCOUNTER — TRANSCRIPTION ENCOUNTER (OUTPATIENT)
Age: 28
End: 2023-03-08

## 2023-03-17 ENCOUNTER — APPOINTMENT (OUTPATIENT)
Dept: NUCLEAR MEDICINE | Facility: CLINIC | Age: 28
End: 2023-03-17
Payer: COMMERCIAL

## 2023-03-17 ENCOUNTER — OUTPATIENT (OUTPATIENT)
Dept: OUTPATIENT SERVICES | Facility: HOSPITAL | Age: 28
LOS: 1 days | End: 2023-03-17

## 2023-03-17 ENCOUNTER — RESULT REVIEW (OUTPATIENT)
Age: 28
End: 2023-03-17

## 2023-03-17 DIAGNOSIS — Z90.89 ACQUIRED ABSENCE OF OTHER ORGANS: Chronic | ICD-10-CM

## 2023-03-17 DIAGNOSIS — Z00.8 ENCOUNTER FOR OTHER GENERAL EXAMINATION: ICD-10-CM

## 2023-03-17 DIAGNOSIS — N83.519 TORSION OF OVARY AND OVARIAN PEDICLE, UNSPECIFIED SIDE: Chronic | ICD-10-CM

## 2023-03-17 PROCEDURE — 78227 HEPATOBIL SYST IMAGE W/DRUG: CPT | Mod: 26

## 2023-05-25 NOTE — ED ADULT NURSE NOTE - CHIEF COMPLAINT QUOTE
Shortness of breath.  Diagnosed with COvid 2 weeks ago.  Has seen pulmonologist whom recommended CTA to R/O PE.  O2 sat triage 99% Abbe Flap (Lower To Upper Lip) Text: The defect of the upper lip was assessed and measured.  Given the location and size of the defect, an Abbe flap was deemed most appropriate. Using a sterile surgical marker, an appropriate Abbe flap was measured and drawn on the lower lip. Local anesthesia was then infiltrated. A scalpel was then used to incise the upper lip through and through the skin, vermilion, muscle and mucosa, leaving the flap pedicled on the opposite side.  The flap was then rotated and transferred to the lower lip defect.  The flap was then sutured into place with a three layer technique, closing the orbicularis oris muscle layer with subcutaneous buried sutures, followed by a mucosal layer and an epidermal layer.

## 2023-05-26 ENCOUNTER — APPOINTMENT (OUTPATIENT)
Dept: ORTHOPEDIC SURGERY | Facility: CLINIC | Age: 28
End: 2023-05-26

## 2023-07-24 ENCOUNTER — APPOINTMENT (OUTPATIENT)
Dept: INTERNAL MEDICINE | Facility: CLINIC | Age: 28
End: 2023-07-24
Payer: COMMERCIAL

## 2023-07-24 VITALS
BODY MASS INDEX: 28 KG/M2 | TEMPERATURE: 98 F | WEIGHT: 158 LBS | OXYGEN SATURATION: 100 % | HEIGHT: 63 IN | SYSTOLIC BLOOD PRESSURE: 106 MMHG | DIASTOLIC BLOOD PRESSURE: 76 MMHG | HEART RATE: 112 BPM

## 2023-07-24 DIAGNOSIS — J32.9 CHRONIC SINUSITIS, UNSPECIFIED: ICD-10-CM

## 2023-07-24 PROCEDURE — 99213 OFFICE O/P EST LOW 20 MIN: CPT

## 2023-07-24 RX ORDER — AMOXICILLIN AND CLAVULANATE POTASSIUM 875; 125 MG/1; MG/1
875-125 TABLET, COATED ORAL TWICE DAILY
Qty: 20 | Refills: 0 | Status: ACTIVE | COMMUNITY
Start: 2023-07-24 | End: 1900-01-01

## 2023-07-24 NOTE — PHYSICAL EXAM
[Normal Sclera/Conjunctiva] : normal sclera/conjunctiva [PERRL] : pupils equal round and reactive to light [EOMI] : extraocular movements intact [Normal Outer Ear/Nose] : the outer ears and nose were normal in appearance [Normal Oropharynx] : the oropharynx was normal [Coordination Grossly Intact] : coordination grossly intact [No Focal Deficits] : no focal deficits [Normal Gait] : normal gait [Normal Affect] : the affect was normal [Normal Insight/Judgement] : insight and judgment were intact [de-identified] : appears sick [de-identified] : b/l fluid behind TM and erythema [de-identified] : tender throat [de-identified] : hacking cough

## 2023-07-24 NOTE — HISTORY OF PRESENT ILLNESS
[FreeTextEntry8] : 28 year F with PMH pre-DM, exercise induced asthma, and IBS presents for sinusitis.

## 2023-07-24 NOTE — PLAN
[FreeTextEntry1] : Plan as above. Pt advised to sign up for [facility] portal to review labs and communicate any questions or concerns directly. Yearly physical and return as needed for illness, medication refills, and new or existing complaints. \par \par Additionally, conservative treatment for URI. Increase hydration, use OTC remedies for symptoms such as warm liquids and Cepacol for sore throat, Tylenol for pain or fever, Flonase for congestion. Wash hands to avoid spreading illness.

## 2023-07-26 ENCOUNTER — APPOINTMENT (OUTPATIENT)
Dept: INTERNAL MEDICINE | Facility: CLINIC | Age: 28
End: 2023-07-26

## 2024-01-10 ENCOUNTER — TRANSCRIPTION ENCOUNTER (OUTPATIENT)
Age: 29
End: 2024-01-10

## 2024-01-15 ENCOUNTER — APPOINTMENT (OUTPATIENT)
Dept: ENDOCRINOLOGY | Facility: CLINIC | Age: 29
End: 2024-01-15
Payer: COMMERCIAL

## 2024-01-15 DIAGNOSIS — Z86.39 PERSONAL HISTORY OF OTHER ENDOCRINE, NUTRITIONAL AND METABOLIC DISEASE: ICD-10-CM

## 2024-01-15 DIAGNOSIS — R73.03 PREDIABETES.: ICD-10-CM

## 2024-01-15 PROCEDURE — 99213 OFFICE O/P EST LOW 20 MIN: CPT | Mod: 95

## 2024-01-15 RX ORDER — SEMAGLUTIDE 1.34 MG/ML
4 INJECTION, SOLUTION SUBCUTANEOUS
Qty: 3 | Refills: 2 | Status: ACTIVE | COMMUNITY
Start: 2021-09-14 | End: 1900-01-01

## 2024-01-15 NOTE — REASON FOR VISIT
[Home] : at home, [unfilled] , at the time of the visit. [Medical Office: ([facility])___] : at the medical office located in  [Patient] : the patient [Follow - Up] : a follow-up visit

## 2024-01-16 NOTE — HISTORY OF PRESENT ILLNESS
[FreeTextEntry1] : Ms. MIKY ESTRADA is a 28 year old female with a past medical history of ovarian cysts, PCOS, IBS presents for follow up.  Patient has gained back 20 pounds.  She stopped the Ozempic 2 years ago and was able to maintain her weight.  She was found to have recent high prolactin by GYN.  Initial Hx: Patient did not have her menses for 3 months (Jan-Apr) and had blood work done. She had blood tests done which found that she had elevated Prolactin. Her levels were on 4/27 (64.5) and 6/30 (88.8). Patient was on OCP when it stopped. Since then she has not taken it. LMP 9/14/21. Patient does take Zoloft but had stopped it for last few months. She denies any regular Marijuana use. No history of antipsychotics, galactorrhea, headaches or vision problems. Patient had a MRI with contrast that showed no mass.

## 2024-01-16 NOTE — ASSESSMENT
[FreeTextEntry1] : 26 year old female with a past medical history of ovarian cysts, PCOS, IBS presents for evaluation for elevated Prolactin  1. Hyperprolactinemia MRI neg for pituitary pathology Likely due to macroprolactin Do not suspect prolactin pathology We will send for repeat blood  2. Prediabetes/weight gain Restart Ozempic Will repeat A1c

## 2024-01-22 ENCOUNTER — TRANSCRIPTION ENCOUNTER (OUTPATIENT)
Age: 29
End: 2024-01-22

## 2024-01-25 NOTE — ED PROVIDER NOTE - PATIENT PORTAL LINK FT
Report called to ICU at [place] at this time. [phone] to [place].  [facility] at bedside for transport.      You can access the FollowMyHealth Patient Portal offered by [facility] by registering at the following website: [email or web address]. By joining [employer]’s FollowMyHealth portal, you will also be able to view your health information using other applications (apps) compatible with our system.

## 2024-02-02 ENCOUNTER — TRANSCRIPTION ENCOUNTER (OUTPATIENT)
Age: 29
End: 2024-02-02

## 2024-04-01 NOTE — ASU PATIENT PROFILE, ADULT - NS PRO MODE OF ARRIVAL
Where Is Your Acne Located?: Face
Additional Comments (Use Complete Sentences): Patient uses tretinoin frequently, and has used it to treat her current “pimple”. She reports it does not feel like a Quoc pimple to her. She would also like to talk about possible psoriasis in scalp and ears. Recently notice it. She report being under a lot of stress due to her health and parents .
ambulatory

## 2024-05-01 NOTE — ED PROVIDER NOTE - PROGRESS NOTE DETAILS
Call placed to outpatient GI, no return call.  Patient states she feels better asking to leave.  P.o. challenge performed. Please see above for the rest of the details.

## 2024-06-11 NOTE — ED PROVIDER NOTE - PMH
Hpi Title: Evaluation of Skin Lesions Additional History: No concerning lesions at this time Anxiety and depression    Glomerulonephritis, streptococcal, acute    Hirsutism    IUD (intrauterine device) in place    Migraine    Ovarian cyst    Ovarian torsion

## 2024-07-11 NOTE — ED ADULT NURSE NOTE - CHPI ED NUR DURATION
07/11/24 0500   Appointment Info   Signing clinician's name / credentials Lori Lopez, PT, DPT   Total/Authorized Visits [facility]   Visits Used 6   Medical Diagnosis Spina bifida of lumbar region with hydrocephalus (H) (Q05.2)   PT Tx Diagnosis force production deficit.   Other pertinent information Velia Beckett, EDUARDO   Progress Note/Certification   Start of Care Date 04/16/24   Onset of illness/injury or Date of Surgery 04/10/24   Therapy Frequency 1x/week, 4-6 visits   Predicted Duration 90 days   Certification date from 04/16/24   Certification date to 07/15/24       Present No    ID or First/Last Name patient declining  use   PT Goal 1   Goal Identifier self cares   Goal Description Pt able to care for herself and perform lower body dressing indep with no more than a 1-2 point increase in back pain that returns to base line with in 10 mins of performing the task 4/7 days per week   Rationale to maximize safety and independence with performance of ADLs and functional tasks;to maximize safety and independence within the home   Goal Progress have OT Order now.  cooks eggs sometimes.  no cleaning but a littie in kitchen /counters.  cannot do dishes.  1/3/24 i wash a few dishes. 2/13/24: Still getting radiating back pain into R LE, tries to use a high chair when cooking. Falls 1x per day if she gets a strong enough zap of pain.  3/7 is cooking some food.  doing cups for washing.  no laundry  can sit to fold clothes. using a chair facing bed.  use a chair in kitchen husb bought a chair.   Target Date 07/15/24   PT Goal 2   Goal Identifier walking   Goal Description patient able to walk indep with least AD  for 6 minutes With no increase in back pain and no shooting pain down the legs during or after   Goal Progress not met.  she states 5 min walks then nerve pain and needs to sit. R leg- uses cane and binder.   Target Date 07/15/24   PT Goal 3   Goal Identifier AUTUMN    Goal Description Pt will demo a 20% improvement on the AUTUMN to indicate improved function and decreased LBP pain and disability   Goal Progress 4/16/24   68.89%;  5/13/24.  64.44 6/20/24: 64.0%   Target Date 07/26/24   Subjective Report   Subjective Report Patient does not note any changes at this time   Therapeutic Procedure/Exercise   Therapeutic Procedures: strength, endurance, ROM, flexibility minutes (36831) 30   PTRx Ther Proc 1 Ball Sitting Heel Ups   PTRx Ther Proc 1 - Details sitting on ball x 3 min - moved ball forward. back and side/side.   katty well; rested on chair repeat x 2 min arm lifts x 5 bilat and toe taps 5x both ankles doing DF at same time   PTRx Ther Proc 2 Ball Exercise Supine Leg Roll   PTRx Ther Proc 2 - Details supine ball under legs- cues to NOT roll ball side to side as in video but to and away did 10x w/ cues   PTRx Ther Proc 5 Clamshells   PTRx Ther Proc 5 - Details x10 BL, no increase in pain   PTRx Ther Proc 6 Bridging #1   PTRx Ther Proc 6 - Details 10 reps before fatigue no increase in pain.   Skilled Intervention cues and ensuring proper form for DC   Patient Response/Progress katty well without increase in back pain   Education   Learner/Method Patient   Education Comments HEP, DC   Plan   Home program HEP   Plan for next session Pt DC PT   Comments   Comments Patient with minimal pain reduction progress despite multiple bouts of PT and consistency with home HEP. Per previous POC, PT recommended patient discuss alternate pain reduction techniques with PCP. She notes she did not do this and thus this was re-iterated to patient. Will have OT evaluation for assist with self cares. Patient to DC PT at this time.   Total Session Time   Timed Code Treatment Minutes 30   Total Treatment Time (sum of timed and untimed services) 30         DISCHARGE  Reason for Discharge: No further expectation of progress. Patient with minimal pain reduction progress despite multiple bouts of PT and  consistency with home HEP. Per previous POC, PT recommended patient discussing alternate pain management techniques with PCP. She notes she did not do this and thus this was re-iterated to patient. Will have OT evaluation for assist with self cares. Patient to DC PT at this time.     Equipment Issued: Patient has SPC.     Discharge Plan: Patient to continue home program.  Other services: OT.  Recommend follow up with PCP for further pain management.     Referring Provider:  Velia Beckett     today

## 2024-07-26 NOTE — ASU PREOP CHECKLIST - BOWEL PREP
Latest Reference Range & Units Most Recent   Platelet Count 150 - 450 K/uL 44 (LL)  7/26/24 05:19   (LL): Data is critically low. MD aware.    n/a

## 2024-10-30 NOTE — ED ADULT NURSE NOTE - NS ED NURSE IV DC DT
Physical Therapy Daily Treatment Note                      [facility] [address] [facility] [place] [address]    Patient: Todd Wilder   : 1959  Diagnosis/ICD-10 Code:  S/P medial meniscectomy of left knee [Z98.890]  Referring practitioner: Eric Peres MD  Date of Initial Visit: Type: THERAPY  Noted: 2024  Today's Date: 10/30/2024  Patient seen for 7 sessions           Subjective   The patient reported no new complaints today. His knee pain today is a 5/10.    Objective   See Exercise, Manual, and Modality Logs for complete treatment.     Assessment/Plan    The patient demonstrated good tolerance to all functional hip/knee strengthening exercise. Continue to progress with current PT plan of care per patient tolerance.         Timed:  Manual Therapy:    0     mins  54525;  Therapeutic Exercise:    20     mins  27816;     Neuromuscular Dean:   6    mins  80640;    Therapeutic Activity:     12     mins  23825;     Gait Trainin     mins  28186;     Aquatics                         0      mins  13596    Un-timed:  Mechanical Traction      0     mins  95735  Dry Needling     0     mins self-pay  Electrical Stimulation:    0     mins  30251 ( );      Timed Treatment:   38   mins   Total Treatment:     38   mins    José Miguel Marrero PT  Physical Therapist    Electronically signed 10/30/2024    [place] License: PT - [id number]                      
28-Nov-2019 22:53

## 2024-12-27 ENCOUNTER — EMERGENCY (EMERGENCY)
Facility: HOSPITAL | Age: 29
LOS: 1 days | Discharge: HOME | End: 2024-12-27
Attending: EMERGENCY MEDICINE | Admitting: EMERGENCY MEDICINE
Payer: COMMERCIAL

## 2024-12-27 VITALS
SYSTOLIC BLOOD PRESSURE: 124 MMHG | HEART RATE: 118 BPM | RESPIRATION RATE: 16 BRPM | DIASTOLIC BLOOD PRESSURE: 75 MMHG | TEMPERATURE: 98 F | OXYGEN SATURATION: 98 % | HEIGHT: 63 IN | WEIGHT: 175.93 LBS

## 2024-12-27 VITALS
SYSTOLIC BLOOD PRESSURE: 107 MMHG | DIASTOLIC BLOOD PRESSURE: 75 MMHG | RESPIRATION RATE: 19 BRPM | HEART RATE: 98 BPM | OXYGEN SATURATION: 99 %

## 2024-12-27 DIAGNOSIS — Z90.89 ACQUIRED ABSENCE OF OTHER ORGANS: Chronic | ICD-10-CM

## 2024-12-27 DIAGNOSIS — N83.519 TORSION OF OVARY AND OVARIAN PEDICLE, UNSPECIFIED SIDE: Chronic | ICD-10-CM

## 2024-12-27 LAB
ALBUMIN SERPL ELPH-MCNC: 3.9 G/DL — SIGNIFICANT CHANGE UP (ref 3.3–5)
ALP SERPL-CCNC: 98 U/L — SIGNIFICANT CHANGE UP (ref 40–120)
ALT FLD-CCNC: 33 U/L — SIGNIFICANT CHANGE UP (ref 12–78)
ANION GAP SERPL CALC-SCNC: 8 MMOL/L — SIGNIFICANT CHANGE UP (ref 5–17)
APPEARANCE UR: CLEAR — SIGNIFICANT CHANGE UP
AST SERPL-CCNC: 22 U/L — SIGNIFICANT CHANGE UP (ref 15–37)
BASOPHILS # BLD AUTO: 0.05 K/UL — SIGNIFICANT CHANGE UP (ref 0–0.2)
BASOPHILS NFR BLD AUTO: 0.4 % — SIGNIFICANT CHANGE UP (ref 0–2)
BILIRUB SERPL-MCNC: 0.1 MG/DL — LOW (ref 0.2–1.2)
BILIRUB UR-MCNC: NEGATIVE — SIGNIFICANT CHANGE UP
BUN SERPL-MCNC: 10 MG/DL — SIGNIFICANT CHANGE UP (ref 7–23)
CALCIUM SERPL-MCNC: 9.3 MG/DL — SIGNIFICANT CHANGE UP (ref 8.5–10.1)
CHLORIDE SERPL-SCNC: 106 MMOL/L — SIGNIFICANT CHANGE UP (ref 96–108)
CO2 SERPL-SCNC: 25 MMOL/L — SIGNIFICANT CHANGE UP (ref 22–31)
COLOR SPEC: YELLOW — SIGNIFICANT CHANGE UP
CREAT SERPL-MCNC: 0.79 MG/DL — SIGNIFICANT CHANGE UP (ref 0.5–1.3)
DIFF PNL FLD: NEGATIVE — SIGNIFICANT CHANGE UP
EGFR: 103 ML/MIN/1.73M2 — SIGNIFICANT CHANGE UP
EOSINOPHIL # BLD AUTO: 0.09 K/UL — SIGNIFICANT CHANGE UP (ref 0–0.5)
EOSINOPHIL NFR BLD AUTO: 0.7 % — SIGNIFICANT CHANGE UP (ref 0–6)
GLUCOSE SERPL-MCNC: 120 MG/DL — HIGH (ref 70–99)
GLUCOSE UR QL: NEGATIVE MG/DL — SIGNIFICANT CHANGE UP
HCG SERPL-ACNC: <1 MIU/ML — SIGNIFICANT CHANGE UP
HCT VFR BLD CALC: 39.2 % — SIGNIFICANT CHANGE UP (ref 34.5–45)
HGB BLD-MCNC: 13.4 G/DL — SIGNIFICANT CHANGE UP (ref 11.5–15.5)
IMM GRANULOCYTES NFR BLD AUTO: 0.4 % — SIGNIFICANT CHANGE UP (ref 0–0.9)
KETONES UR-MCNC: NEGATIVE MG/DL — SIGNIFICANT CHANGE UP
LEUKOCYTE ESTERASE UR-ACNC: NEGATIVE — SIGNIFICANT CHANGE UP
LIDOCAIN IGE QN: 16 U/L — SIGNIFICANT CHANGE UP (ref 13–75)
LYMPHOCYTES # BLD AUTO: 1.77 K/UL — SIGNIFICANT CHANGE UP (ref 1–3.3)
LYMPHOCYTES # BLD AUTO: 12.8 % — LOW (ref 13–44)
MCHC RBC-ENTMCNC: 29.3 PG — SIGNIFICANT CHANGE UP (ref 27–34)
MCHC RBC-ENTMCNC: 34.2 G/DL — SIGNIFICANT CHANGE UP (ref 32–36)
MCV RBC AUTO: 85.6 FL — SIGNIFICANT CHANGE UP (ref 80–100)
MONOCYTES # BLD AUTO: 0.75 K/UL — SIGNIFICANT CHANGE UP (ref 0–0.9)
MONOCYTES NFR BLD AUTO: 5.4 % — SIGNIFICANT CHANGE UP (ref 2–14)
NEUTROPHILS # BLD AUTO: 11.07 K/UL — HIGH (ref 1.8–7.4)
NEUTROPHILS NFR BLD AUTO: 80.3 % — HIGH (ref 43–77)
NITRITE UR-MCNC: NEGATIVE — SIGNIFICANT CHANGE UP
NRBC # BLD: 0 /100 WBCS — SIGNIFICANT CHANGE UP (ref 0–0)
PH UR: 7.5 — SIGNIFICANT CHANGE UP (ref 5–8)
PLATELET # BLD AUTO: 389 K/UL — SIGNIFICANT CHANGE UP (ref 150–400)
POTASSIUM SERPL-MCNC: 4 MMOL/L — SIGNIFICANT CHANGE UP (ref 3.5–5.3)
POTASSIUM SERPL-SCNC: 4 MMOL/L — SIGNIFICANT CHANGE UP (ref 3.5–5.3)
PROT SERPL-MCNC: 7.5 G/DL — SIGNIFICANT CHANGE UP (ref 6–8.3)
PROT UR-MCNC: NEGATIVE MG/DL — SIGNIFICANT CHANGE UP
RBC # BLD: 4.58 M/UL — SIGNIFICANT CHANGE UP (ref 3.8–5.2)
RBC # FLD: 13.2 % — SIGNIFICANT CHANGE UP (ref 10.3–14.5)
SODIUM SERPL-SCNC: 139 MMOL/L — SIGNIFICANT CHANGE UP (ref 135–145)
SP GR SPEC: 1.04 — HIGH (ref 1–1.03)
TROPONIN I, HIGH SENSITIVITY RESULT: 3.5 NG/L — SIGNIFICANT CHANGE UP
UROBILINOGEN FLD QL: 0.2 MG/DL — SIGNIFICANT CHANGE UP (ref 0.2–1)
WBC # BLD: 13.78 K/UL — HIGH (ref 3.8–10.5)
WBC # FLD AUTO: 13.78 K/UL — HIGH (ref 3.8–10.5)

## 2024-12-27 PROCEDURE — 74177 CT ABD & PELVIS W/CONTRAST: CPT | Mod: 26,MC

## 2024-12-27 PROCEDURE — 71045 X-RAY EXAM CHEST 1 VIEW: CPT

## 2024-12-27 PROCEDURE — 96376 TX/PRO/DX INJ SAME DRUG ADON: CPT

## 2024-12-27 PROCEDURE — 99285 EMERGENCY DEPT VISIT HI MDM: CPT | Mod: 25

## 2024-12-27 PROCEDURE — 36415 COLL VENOUS BLD VENIPUNCTURE: CPT

## 2024-12-27 PROCEDURE — 81003 URINALYSIS AUTO W/O SCOPE: CPT

## 2024-12-27 PROCEDURE — 93010 ELECTROCARDIOGRAM REPORT: CPT

## 2024-12-27 PROCEDURE — 84484 ASSAY OF TROPONIN QUANT: CPT

## 2024-12-27 PROCEDURE — 83690 ASSAY OF LIPASE: CPT

## 2024-12-27 PROCEDURE — 96375 TX/PRO/DX INJ NEW DRUG ADDON: CPT

## 2024-12-27 PROCEDURE — 80053 COMPREHEN METABOLIC PANEL: CPT

## 2024-12-27 PROCEDURE — 74177 CT ABD & PELVIS W/CONTRAST: CPT | Mod: MC

## 2024-12-27 PROCEDURE — 85025 COMPLETE CBC W/AUTO DIFF WBC: CPT

## 2024-12-27 PROCEDURE — 84702 CHORIONIC GONADOTROPIN TEST: CPT

## 2024-12-27 PROCEDURE — 71045 X-RAY EXAM CHEST 1 VIEW: CPT | Mod: 26

## 2024-12-27 PROCEDURE — 76830 TRANSVAGINAL US NON-OB: CPT | Mod: 26

## 2024-12-27 PROCEDURE — 76830 TRANSVAGINAL US NON-OB: CPT

## 2024-12-27 PROCEDURE — 93005 ELECTROCARDIOGRAM TRACING: CPT

## 2024-12-27 PROCEDURE — 96374 THER/PROPH/DIAG INJ IV PUSH: CPT | Mod: XU

## 2024-12-27 PROCEDURE — 99285 EMERGENCY DEPT VISIT HI MDM: CPT

## 2024-12-27 RX ORDER — FAMOTIDINE 20 MG/1
20 TABLET, FILM COATED ORAL ONCE
Refills: 0 | Status: COMPLETED | OUTPATIENT
Start: 2024-12-27 | End: 2024-12-27

## 2024-12-27 RX ORDER — KETOROLAC TROMETHAMINE 30 MG/ML
30 INJECTION INTRAMUSCULAR; INTRAVENOUS ONCE
Refills: 0 | Status: DISCONTINUED | OUTPATIENT
Start: 2024-12-27 | End: 2024-12-27

## 2024-12-27 RX ORDER — ACETAMINOPHEN 500MG 500 MG/1
1000 TABLET, COATED ORAL ONCE
Refills: 0 | Status: COMPLETED | OUTPATIENT
Start: 2024-12-27 | End: 2024-12-27

## 2024-12-27 RX ORDER — SODIUM CHLORIDE 9 MG/ML
1000 INJECTION, SOLUTION INTRAMUSCULAR; INTRAVENOUS; SUBCUTANEOUS ONCE
Refills: 0 | Status: COMPLETED | OUTPATIENT
Start: 2024-12-27 | End: 2024-12-27

## 2024-12-27 RX ORDER — HYOSCYAMINE SULFATE 0.125 MG
0.12 TABLET, SUBLINGUAL SUBLINGUAL ONCE
Refills: 0 | Status: COMPLETED | OUTPATIENT
Start: 2024-12-27 | End: 2024-12-27

## 2024-12-27 RX ORDER — ONDANSETRON HYDROCHLORIDE 4 MG/1
4 TABLET, FILM COATED ORAL ONCE
Refills: 0 | Status: COMPLETED | OUTPATIENT
Start: 2024-12-27 | End: 2024-12-27

## 2024-12-27 RX ADMIN — ACETAMINOPHEN 500MG 400 MILLIGRAM(S): 500 TABLET, COATED ORAL at 08:26

## 2024-12-27 RX ADMIN — KETOROLAC TROMETHAMINE 30 MILLIGRAM(S): 30 INJECTION INTRAMUSCULAR; INTRAVENOUS at 06:32

## 2024-12-27 RX ADMIN — KETOROLAC TROMETHAMINE 30 MILLIGRAM(S): 30 INJECTION INTRAMUSCULAR; INTRAVENOUS at 08:26

## 2024-12-27 RX ADMIN — Medication 4 MILLIGRAM(S): at 04:40

## 2024-12-27 RX ADMIN — ONDANSETRON HYDROCHLORIDE 4 MILLIGRAM(S): 4 TABLET, FILM COATED ORAL at 03:54

## 2024-12-27 RX ADMIN — FAMOTIDINE 20 MILLIGRAM(S): 20 TABLET, FILM COATED ORAL at 03:55

## 2024-12-27 RX ADMIN — ONDANSETRON HYDROCHLORIDE 4 MILLIGRAM(S): 4 TABLET, FILM COATED ORAL at 08:26

## 2024-12-27 RX ADMIN — Medication 0.12 MILLIGRAM(S): at 11:12

## 2024-12-27 RX ADMIN — Medication 4 MILLIGRAM(S): at 03:52

## 2024-12-27 RX ADMIN — SODIUM CHLORIDE 1000 MILLILITER(S): 9 INJECTION, SOLUTION INTRAMUSCULAR; INTRAVENOUS; SUBCUTANEOUS at 03:52

## 2024-12-27 RX ADMIN — ACETAMINOPHEN 500MG 1000 MILLIGRAM(S): 500 TABLET, COATED ORAL at 09:00

## 2024-12-27 NOTE — ED PROVIDER NOTE - NSFOLLOWUPINSTRUCTIONS_ED_ALL_ED_FT
36.7 Abdominal Pain    Many things can cause abdominal pain. Many times, abdominal pain is not caused by a disease and will improve without treatment. Your health care provider will do a physical exam to determine if there is a dangerous cause of your pain; blood tests and imaging may help determine the cause of your pain. However, in many cases, no cause may be found and you may need further testing as an outpatient. Monitor your abdominal pain for any changes.     SEEK IMMEDIATE MEDICAL CARE IF YOU HAVE ANY OF THE FOLLOWING SYMPTOMS: worsening abdominal pain, uncontrollable vomiting, profuse diarrhea, inability to have bowel movements or pass gas, black or bloody stools, fever accompanying chest pain or back pain, or fainting. These symptoms may represent a serious problem that is an emergency. Do not wait to see if the symptoms will go away. Get medical help right away. Call 911 and do not drive yourself to the hospital.

## 2024-12-27 NOTE — ED ADULT NURSE REASSESSMENT NOTE - NS ED NURSE REASSESS COMMENT FT1
Pt uncomfortable with discharge at this time. Writer RN spoke with patient at the bedside. Pt requesting GI consult, spoke with Dr. Lynn GRANT to reach out to GI. Update provided to patient and primary RN Issa. Selena calvert within reach.

## 2024-12-27 NOTE — CONSULT NOTE ADULT - ASSESSMENT
abdominal pain  vomiting    reg diet  CT negative  labs wnl  trial of hyoscamine  no need for admission  outpatient follow up with primary GI

## 2024-12-27 NOTE — ED ADULT NURSE NOTE - OBJECTIVE STATEMENT
Pt received in room 13A, pt is A+Ox4 and independent with ambulation and ADLs at baseline. Pt is presenting with a chief complaint of acute onset of severe abdominal pain - pt endorses hx of IBS. Pt states that she woke up approx 1.5 hours prior to arrival to ED with severe abdominal pain. Pt states she thought it was because she needed to have a BM but per pt the pain increased after having a formed bowel movement. Pt states the pain is midline/in the umbilical area, radiating ot the back. Pt also reports 5 episodes of emesis prior to arrival, still endorsing nausea at time of assessment. Pt denies cp/sob/palpitations. IVL placed with labs drawn and sent as ordered. Medications administered per eMAR.

## 2024-12-27 NOTE — ED PROVIDER NOTE - PATIENT PORTAL LINK FT
You can access the FollowMyHealth Patient Portal offered by [facility] by registering at the following website: [email or web address]. By joining [employer]’s FollowMyHealth portal, you will also be able to view your health information using other applications (apps) compatible with our system.

## 2024-12-27 NOTE — ED ADULT NURSE REASSESSMENT NOTE - NS ED NURSE REASSESS COMMENT FT1
@1130 pt expresses feeling of frustration regarding discharge. states "I feel like no one answered my questions, im very disappointed." pt declines to speak with management at time of discharge to discuss grievances, pt states "I don't need to talk to you manger I don't think shell be able to do anything for me, ill just leave."

## 2024-12-27 NOTE — ED ADULT NURSE REASSESSMENT NOTE - NS ED NURSE REASSESS COMMENT FT1
pt resting in stretcher warm packet provided for pain, pt seen by gi per pt, gastro to order medication to be order will continue to monitor pt. Kika LANDERS

## 2024-12-27 NOTE — ED ADULT NURSE NOTE - NSSEPSISNEWALTERMENTAL_ED_A_ED

## 2024-12-27 NOTE — ED ADULT NURSE NOTE - CAS ELECT INFOMATION PROVIDED
pt states she has no questions regarding discharge paperwork, espresses feelings of frustrations regarding discharge./DC instructions

## 2024-12-27 NOTE — ED PROVIDER NOTE - OBJECTIVE STATEMENT
29-year-old female with history of PCOS, IBS, endometriosis, history of ovarian torsion presenting with periumbilical abdominal pain that started 45 minutes ago with 5-6 episodes of nausea and vomiting 1 episode of loose stool.  LMP was 1 week ago.  Denies fever or chills.  Denies urinary symptoms.

## 2024-12-27 NOTE — ED ADULT NURSE REASSESSMENT NOTE - NS ED NURSE REASSESS COMMENT FT1
(Shift change) Pt received from previous RN, pt at baseline, resting in stretcher, no acute distress noted at this time. Safety checks completed, Pt provided update on care, call bell provided to pt. will continue to monitor pt. Kika LANDERS

## 2024-12-27 NOTE — CONSULT NOTE ADULT - SUBJECTIVE AND OBJECTIVE BOX
[facility]    Tomasz Dru Wu NP    [address] [place] [address] [phone]      Chief Complaint:  Patient is a 29y old  Female who presents with a chief complaint of     HPI:29-year-old female with history of PCOS, IBS, endometriosis, history of ovarian torsion presenting with periumbilical abdominal pain that started 45 minutes ago with 5-6 episodes of nausea and vomiting 1 episode of loose stool.  LMP was 1 week ago.  Denies fever or chills.  Denies urinary symptoms.    Allergies:  eggs (Diarrhea)  Seafood (Unknown)  shellfish (Anaphylaxis)  No Known Drug Allergies  fish (Hives)      Medications:      PMHX/PSHX:  No pertinent past medical history    Ovarian cyst    Ovarian torsion    IUD (intrauterine device) in place    Glomerulonephritis, streptococcal, acute    No pertinent past medical history    Anxiety and depression    Hirsutism    Migraine    COVID-19    No significant past surgical history    No significant past surgical history    Ovarian torsion    History of tonsillectomy        Family history:  No pertinent family history in first degree relatives        Social History:     ROS:     General:  No wt loss, fevers, chills, night sweats, fatigue,   Eyes:  Good vision, no reported pain  ENT:  No sore throat, pain, runny nose, dysphagia  CV:  No pain, palpitations, hypo/hypertension  Resp:  No dyspnea, cough, tachypnea, wheezing  GI:  No pain, No nausea, No vomiting, No diarrhea, No constipation, No weight loss, No fever, No pruritis, No rectal bleeding, No tarry stools, No dysphagia,  :  No pain, bleeding, incontinence, nocturia  Muscle:  No pain, weakness  Neuro:  No weakness, tingling, memory problems  Psych:  No fatigue, insomnia, mood problems, depression  Endocrine:  No polyuria, polydipsia, cold/heat intolerance  Heme:  No petechiae, ecchymosis, easy bruisability  Skin:  No rash, tattoos, scars, edema      PHYSICAL EXAM:   Vital Signs:  Vital Signs Last 24 Hrs  T(C): 36.7 (27 Dec 2024 02:58), Max: 36.7 (27 Dec 2024 02:58)  T(F): 98 (27 Dec 2024 02:58), Max: 98 (27 Dec 2024 02:58)  HR: 98 (27 Dec 2024 08:26) (98 - 118)  BP: 107/75 (27 Dec 2024 08:26) (107/75 - 124/75)  BP(mean): --  RR: 19 (27 Dec 2024 08:26) (16 - 19)  SpO2: 99% (27 Dec 2024 08:26) (98% - 99%)    Parameters below as of 27 Dec 2024 08:26  Patient On (Oxygen Delivery Method): room air      Daily Height in cm: 160.02 (27 Dec 2024 02:58)    Daily     GENERAL:  Appears stated age, well-groomed, well-nourished, no distress  HEENT:  NC/AT,  conjunctivae clear and pink, no thyromegaly, nodules, adenopathy, no JVD, sclera -anicteric  CHEST:  Full & symmetric excursion, no increased effort, breath sounds clear  HEART:  Regular rhythm, S1, S2, no murmur/rub/S3/S4, no abdominal bruit, no edema  ABDOMEN:  Soft, non-tender, non-distended, normoactive bowel sounds,  no masses ,no hepato-splenomegaly, no signs of chronic liver disease  EXTEREMITIES:  no cyanosis,clubbing or edema  SKIN:  No rash/erythema/ecchymoses/petechiae/wounds/abscess/warm/dry  NEURO:  Alert, oriented, no asterixis, no tremor, no encephalopathy    LABS:                        13.4   13.78 )-----------( 389      ( 27 Dec 2024 04:10 )             39.2     12    139  |  106  |  10  ----------------------------<  120[H]  4.0   |  25  |  0.79    Ca    9.3      27 Dec 2024 04:10    TPro  7.5  /  Alb  3.9  /  TBili  0.1[L]  /  DBili  x   /  AST  22  /  ALT  33  /  AlkPhos  98  12-    LIVER FUNCTIONS - ( 27 Dec 2024 04:10 )  Alb: 3.9 g/dL / Pro: 7.5 g/dL / ALK PHOS: 98 U/L / ALT: 33 U/L / AST: 22 U/L / GGT: x             Urinalysis Basic - ( 27 Dec 2024 06:07 )    Color: Yellow / Appearance: Clear / S.039 / pH: x  Gluc: x / Ketone: Negative mg/dL  / Bili: Negative / Urobili: 0.2 mg/dL   Blood: x / Protein: Negative mg/dL / Nitrite: Negative   Leuk Esterase: Negative / RBC: x / WBC x   Sq Epi: x / Non Sq Epi: x / Bacteria: x      Amylase Serum--      Lipase serum16       Ammonia--      Imaging:

## 2024-12-27 NOTE — ED ADULT NURSE NOTE - INV PAIN INTERVENTIONS-NUMBER SCALE
Addended Yan Betancourt on: 8/15/2023 09:25 AM     Modules accepted: Orders
single medication modality

## 2024-12-27 NOTE — ED ADULT NURSE NOTE - PRIMARY CARE PROVIDER
Normal hearing in the right ear with type A tympanogram.  Mild hearing loss in the left ear.  Could not obtain seal for tympanometry in the left ear.   UNK

## 2024-12-27 NOTE — ED PROVIDER NOTE - NSICDXPASTSURGICALHX_GEN_ALL_CORE_FT
PAST SURGICAL HISTORY:  History of tonsillectomy     Ovarian torsion      Melolabial Interpolation Flap Text: A decision was made to reconstruct the defect utilizing an interpolation axial flap and a staged reconstruction.  A telfa template was made of the defect.  This telfa template was then used to outline the melolabial interpolation flap.  The donor area for the pedicle flap was then injected with anesthesia.  The flap was excised through the skin and subcutaneous tissue down to the layer of the underlying musculature.  The pedicle flap was carefully excised within this deep plane to maintain its blood supply.  The edges of the donor site were undermined.   The donor site was closed in a primary fashion.  The pedicle was then rotated into position and sutured.  Once the tube was sutured into place, adequate blood supply was confirmed with blanching and refill.  The pedicle was then wrapped with xeroform gauze and dressed appropriately with a telfa and gauze bandage to ensure continued blood supply and protect the attached pedicle.

## 2024-12-27 NOTE — ED PROVIDER NOTE - CARE PROVIDER_API CALL
No Tomasz Gonsales  Gastroenterology  [address] [place] [id number]  Phone: [phone]  Fax: [phone]  Follow Up Time: Routine

## 2024-12-27 NOTE — ED PROVIDER NOTE - CLINICAL SUMMARY MEDICAL DECISION MAKING FREE TEXT BOX
29-year-old female with periumbilical abdominal pain and vomiting.  No significant tenderness on abdominal exam.  Will evaluate for acute appendicitis, UTI, kidney stone.  Will give analgesics, IV fluids, Zofran and Pepcid.  Will reassess.

## 2025-06-13 NOTE — ED ADULT NURSE NOTE - TOBACCO USE
Medication history complete, reviewed medications with patient and confirmed with doctor first med hx.
Patient is being discharged on Eliquis for atrial fibrillation, $24 copay confirmed with outpatient [facility] pharmacy.     Educated patient at bedside regarding indication for Eliquis, how to take this medication including administration/missed dose instructions, side effects to expect versus when to seek medical help [e.g., dark tarry stools, hematuria, etc.], and drug interactions. Provided patient with [facility] apixaban education sheet. All medication-related questions answered. 
Never smoker

## 2025-07-09 NOTE — ED PROVIDER NOTE - TOBACCO USE
Medication passed protocol.     Medication: Lisinopril passed protocol.   Last office visit date: 6/19/25  Next appointment scheduled?: Yes    Never smoker

## (undated) DEVICE — TUBING SUCTION NONCONDUCTIVE 6MM X 12FT

## (undated) DEVICE — PROTECTOR HEEL / ELBOW FLUFFY

## (undated) DEVICE — ELCTR ECG CONDUCTIVE ADHESIVE

## (undated) DEVICE — LINE BREATHE SAMPLNG

## (undated) DEVICE — BITE BLOCK ADULT 20 X 27MM (GREEN)

## (undated) DEVICE — CATH IV SAFE BC 22G X 1" (BLUE)

## (undated) DEVICE — SUT VICRYL 0 27" UR-6

## (undated) DEVICE — DISSECTOR ENDOSCOPIC KITTNER SINGLE TIP

## (undated) DEVICE — DENTURE CUP PINK

## (undated) DEVICE — CLAMP BX HOT RAD JAW 3

## (undated) DEVICE — TUBING MEDI-VAC W MAXIGRIP CONNECTORS 1/4"X6'

## (undated) DEVICE — BASIN EMESIS 10IN GRADUATED MAUVE

## (undated) DEVICE — BIOPSY FORCEP COLD DISP

## (undated) DEVICE — DRSG BANDAID 0.75X3"

## (undated) DEVICE — DRSG 2X2

## (undated) DEVICE — CONTAINER FORMALIN 80ML YELLOW

## (undated) DEVICE — WARMING BLANKET LOWER ADULT

## (undated) DEVICE — GOWN LG

## (undated) DEVICE — DRAPE TOWEL BLUE 17" X 24"

## (undated) DEVICE — DRAPE 3/4 SHEET 52X76"

## (undated) DEVICE — BIOPSY FORCEP RADIAL JAW 4 STANDARD WITH NEEDLE

## (undated) DEVICE — TUBING IV SET GRAVITY 3Y 100" MACRO

## (undated) DEVICE — DRSG DERMABOND 0.7ML

## (undated) DEVICE — ELCTR GROUNDING PAD ADULT COVIDIEN

## (undated) DEVICE — FACESHIELD FULL VISOR

## (undated) DEVICE — DRSG CURITY GAUZE SPONGE 4 X 4" 12-PLY NON-STERILE

## (undated) DEVICE — UNDERPAD LINEN SAVER 17 X 24"

## (undated) DEVICE — SUT MONOCRYL 3-0 27" PS-2 UNDYED

## (undated) DEVICE — LUBRICATING JELLY HR ONE SHOT 3G

## (undated) DEVICE — PACK GENERAL LAPAROSCOPY

## (undated) DEVICE — SALIVA EJECTOR (BLUE)

## (undated) DEVICE — TUBING HYDRO-SURG PLUS IRRIGATOR W SMOKEVAC & PROBE

## (undated) DEVICE — PACK IV START WITH CHG

## (undated) DEVICE — TROCAR COVIDIEN VERSAONE FIXATION CANNULA 5MM